# Patient Record
Sex: FEMALE | Race: WHITE | NOT HISPANIC OR LATINO | URBAN - METROPOLITAN AREA
[De-identification: names, ages, dates, MRNs, and addresses within clinical notes are randomized per-mention and may not be internally consistent; named-entity substitution may affect disease eponyms.]

---

## 2021-03-19 ENCOUNTER — INPATIENT (INPATIENT)
Facility: HOSPITAL | Age: 76
LOS: 2 days | Discharge: HOME | DRG: 811 | End: 2021-03-22
Attending: INTERNAL MEDICINE | Admitting: INTERNAL MEDICINE
Payer: MEDICARE

## 2021-03-19 VITALS
SYSTOLIC BLOOD PRESSURE: 93 MMHG | WEIGHT: 123.02 LBS | HEART RATE: 77 BPM | DIASTOLIC BLOOD PRESSURE: 38 MMHG | TEMPERATURE: 98 F | RESPIRATION RATE: 16 BRPM | OXYGEN SATURATION: 100 %

## 2021-03-19 DIAGNOSIS — D69.6 THROMBOCYTOPENIA, UNSPECIFIED: ICD-10-CM

## 2021-03-19 DIAGNOSIS — D64.9 ANEMIA, UNSPECIFIED: ICD-10-CM

## 2021-03-19 DIAGNOSIS — I10 ESSENTIAL (PRIMARY) HYPERTENSION: ICD-10-CM

## 2021-03-19 DIAGNOSIS — Z29.9 ENCOUNTER FOR PROPHYLACTIC MEASURES, UNSPECIFIED: ICD-10-CM

## 2021-03-19 LAB
ABO RH CONFIRMATION: SIGNIFICANT CHANGE UP
ANION GAP SERPL CALC-SCNC: 8 MMOL/L — SIGNIFICANT CHANGE UP (ref 5–17)
APTT BLD: 31.6 SEC — SIGNIFICANT CHANGE UP (ref 27.5–35.5)
BASOPHILS # BLD AUTO: 0.01 K/UL — SIGNIFICANT CHANGE UP (ref 0–0.2)
BASOPHILS NFR BLD AUTO: 0.2 % — SIGNIFICANT CHANGE UP (ref 0–2)
BUN SERPL-MCNC: 26 MG/DL — HIGH (ref 7–18)
CALCIUM SERPL-MCNC: 8.4 MG/DL — SIGNIFICANT CHANGE UP (ref 8.4–10.5)
CHLORIDE SERPL-SCNC: 105 MMOL/L — SIGNIFICANT CHANGE UP (ref 96–108)
CO2 SERPL-SCNC: 24 MMOL/L — SIGNIFICANT CHANGE UP (ref 22–31)
CREAT SERPL-MCNC: 1.07 MG/DL — SIGNIFICANT CHANGE UP (ref 0.5–1.3)
EOSINOPHIL # BLD AUTO: 0.13 K/UL — SIGNIFICANT CHANGE UP (ref 0–0.5)
EOSINOPHIL NFR BLD AUTO: 2.2 % — SIGNIFICANT CHANGE UP (ref 0–6)
FERRITIN SERPL-MCNC: 33 NG/ML — SIGNIFICANT CHANGE UP (ref 15–150)
FOLATE SERPL-MCNC: 16.2 NG/ML — SIGNIFICANT CHANGE UP
GLUCOSE SERPL-MCNC: 90 MG/DL — SIGNIFICANT CHANGE UP (ref 70–99)
HCT VFR BLD CALC: 20 % — CRITICAL LOW (ref 34.5–45)
HCT VFR BLD CALC: 28.8 % — LOW (ref 34.5–45)
HGB BLD-MCNC: 5.6 G/DL — CRITICAL LOW (ref 11.5–15.5)
HGB BLD-MCNC: 8.9 G/DL — LOW (ref 11.5–15.5)
IMM GRANULOCYTES NFR BLD AUTO: 0.7 % — SIGNIFICANT CHANGE UP (ref 0–1.5)
INR BLD: 0.96 RATIO — SIGNIFICANT CHANGE UP (ref 0.88–1.16)
IRON SATN MFR SERPL: 11 % — LOW (ref 15–50)
IRON SATN MFR SERPL: 35 UG/DL — LOW (ref 40–150)
LDH SERPL L TO P-CCNC: 222 U/L — SIGNIFICANT CHANGE UP (ref 120–225)
LYMPHOCYTES # BLD AUTO: 1.73 K/UL — SIGNIFICANT CHANGE UP (ref 1–3.3)
LYMPHOCYTES # BLD AUTO: 28.9 % — SIGNIFICANT CHANGE UP (ref 13–44)
MCHC RBC-ENTMCNC: 26.8 PG — LOW (ref 27–34)
MCHC RBC-ENTMCNC: 27.5 PG — SIGNIFICANT CHANGE UP (ref 27–34)
MCHC RBC-ENTMCNC: 28 GM/DL — LOW (ref 32–36)
MCHC RBC-ENTMCNC: 30.9 GM/DL — LOW (ref 32–36)
MCV RBC AUTO: 88.9 FL — SIGNIFICANT CHANGE UP (ref 80–100)
MCV RBC AUTO: 95.7 FL — SIGNIFICANT CHANGE UP (ref 80–100)
MONOCYTES # BLD AUTO: 0.37 K/UL — SIGNIFICANT CHANGE UP (ref 0–0.9)
MONOCYTES NFR BLD AUTO: 6.2 % — SIGNIFICANT CHANGE UP (ref 2–14)
NEUTROPHILS # BLD AUTO: 3.71 K/UL — SIGNIFICANT CHANGE UP (ref 1.8–7.4)
NEUTROPHILS NFR BLD AUTO: 61.8 % — SIGNIFICANT CHANGE UP (ref 43–77)
NRBC # BLD: 0 /100 WBCS — SIGNIFICANT CHANGE UP (ref 0–0)
NRBC # BLD: 0 /100 WBCS — SIGNIFICANT CHANGE UP (ref 0–0)
OB PNL STL: NEGATIVE — SIGNIFICANT CHANGE UP
PLATELET # BLD AUTO: 24 K/UL — LOW (ref 150–400)
PLATELET # BLD AUTO: 58 K/UL — LOW (ref 150–400)
POTASSIUM SERPL-MCNC: 4.2 MMOL/L — SIGNIFICANT CHANGE UP (ref 3.5–5.3)
POTASSIUM SERPL-SCNC: 4.2 MMOL/L — SIGNIFICANT CHANGE UP (ref 3.5–5.3)
PROTHROM AB SERPL-ACNC: 11.4 SEC — SIGNIFICANT CHANGE UP (ref 10.6–13.6)
RBC # BLD: 2.09 M/UL — LOW (ref 3.8–5.2)
RBC # BLD: 2.1 M/UL — LOW (ref 3.8–5.2)
RBC # BLD: 3.24 M/UL — LOW (ref 3.8–5.2)
RBC # FLD: 15.6 % — HIGH (ref 10.3–14.5)
RBC # FLD: 17.5 % — HIGH (ref 10.3–14.5)
RETICS #: 92 K/UL — SIGNIFICANT CHANGE UP (ref 25–125)
RETICS/RBC NFR: 4.4 % — HIGH (ref 0.5–2.5)
SARS-COV-2 RNA SPEC QL NAA+PROBE: DETECTED
SODIUM SERPL-SCNC: 137 MMOL/L — SIGNIFICANT CHANGE UP (ref 135–145)
TIBC SERPL-MCNC: 326 UG/DL — SIGNIFICANT CHANGE UP (ref 250–450)
UIBC SERPL-MCNC: 291 UG/DL — SIGNIFICANT CHANGE UP (ref 110–370)
VIT B12 SERPL-MCNC: 380 PG/ML — SIGNIFICANT CHANGE UP (ref 232–1245)
WBC # BLD: 5.99 K/UL — SIGNIFICANT CHANGE UP (ref 3.8–10.5)
WBC # BLD: 6.01 K/UL — SIGNIFICANT CHANGE UP (ref 3.8–10.5)
WBC # FLD AUTO: 5.99 K/UL — SIGNIFICANT CHANGE UP (ref 3.8–10.5)
WBC # FLD AUTO: 6.01 K/UL — SIGNIFICANT CHANGE UP (ref 3.8–10.5)

## 2021-03-19 PROCEDURE — 71260 CT THORAX DX C+: CPT | Mod: 26

## 2021-03-19 PROCEDURE — 74177 CT ABD & PELVIS W/CONTRAST: CPT | Mod: 26

## 2021-03-19 PROCEDURE — 99223 1ST HOSP IP/OBS HIGH 75: CPT | Mod: GC

## 2021-03-19 PROCEDURE — 99285 EMERGENCY DEPT VISIT HI MDM: CPT

## 2021-03-19 PROCEDURE — 93010 ELECTROCARDIOGRAM REPORT: CPT

## 2021-03-19 RX ORDER — CHOLECALCIFEROL (VITAMIN D3) 125 MCG
1000 CAPSULE ORAL DAILY
Refills: 0 | Status: DISCONTINUED | OUTPATIENT
Start: 2021-03-19 | End: 2021-03-22

## 2021-03-19 RX ORDER — ACETAMINOPHEN 500 MG
650 TABLET ORAL EVERY 6 HOURS
Refills: 0 | Status: DISCONTINUED | OUTPATIENT
Start: 2021-03-19 | End: 2021-03-22

## 2021-03-19 RX ORDER — ATORVASTATIN CALCIUM 80 MG/1
80 TABLET, FILM COATED ORAL AT BEDTIME
Refills: 0 | Status: DISCONTINUED | OUTPATIENT
Start: 2021-03-19 | End: 2021-03-22

## 2021-03-19 RX ORDER — METOPROLOL TARTRATE 50 MG
5 TABLET ORAL EVERY 6 HOURS
Refills: 0 | Status: DISCONTINUED | OUTPATIENT
Start: 2021-03-19 | End: 2021-03-22

## 2021-03-19 RX ORDER — PANTOPRAZOLE SODIUM 20 MG/1
40 TABLET, DELAYED RELEASE ORAL DAILY
Refills: 0 | Status: DISCONTINUED | OUTPATIENT
Start: 2021-03-19 | End: 2021-03-22

## 2021-03-19 RX ORDER — PANTOPRAZOLE SODIUM 20 MG/1
40 TABLET, DELAYED RELEASE ORAL
Refills: 0 | Status: DISCONTINUED | OUTPATIENT
Start: 2021-03-19 | End: 2021-03-19

## 2021-03-19 RX ORDER — CARVEDILOL PHOSPHATE 80 MG/1
12.5 CAPSULE, EXTENDED RELEASE ORAL EVERY 12 HOURS
Refills: 0 | Status: DISCONTINUED | OUTPATIENT
Start: 2021-03-19 | End: 2021-03-19

## 2021-03-19 RX ADMIN — ATORVASTATIN CALCIUM 80 MILLIGRAM(S): 80 TABLET, FILM COATED ORAL at 22:21

## 2021-03-19 RX ADMIN — Medication 5 MILLIGRAM(S): at 18:13

## 2021-03-19 RX ADMIN — Medication 650 MILLIGRAM(S): at 19:53

## 2021-03-19 NOTE — H&P ADULT - HISTORY OF PRESENT ILLNESS
75 yr F with PMH of HTN, HLD, right sided Carotid artery 70% stenosis, anemia induced HF in 2020, chronic anemia was sent to ED for low Hb. Pt went to see Dr. Mosley yesterday for her chronic anemia and her Hb was 6.3 and she was sent to ED to get blood transfusion. Pt has chronic anemia since May 2020 and was hospitalised at that time for anemia causing heart failure. Pt got EGD/Colonoscopy in May 2020 at [facility] and it was normal. Pt was admitted recently to [facility] in Feb and she got 2 units blood and was discharged with Hb of 9. Pt complains of generalized weakness, fatigue and palpitations. Pt denies any abdominal pain, bleeding, use of NSAIDS, chest pain, SOB, dizziness or any other acute complaints.    In ED, /50, HR 68

## 2021-03-19 NOTE — ED PROVIDER NOTE - PROGRESS NOTE DETAILS
Patient is resting comfortably, NAD. I spoke to Dr. Mosley. Will admit due to worsening anemia and hypotension. Endorsed to Dr. Christiansen. MAR requested CT abdomen/pelvis. She will follow results. Abdominal exam benign

## 2021-03-19 NOTE — CONSULT NOTE ADULT - ATTENDING COMMENTS
I was physically present for the key portions of the evaluation and management (E/M) service provided.  The patient was personally seen and examined at bedside.  I have edited the note as appropriate.   Thank you for your consultation and allowing  me to participate in the care of your patients. If you have further questions please contact me at [phone].     Manuel Loyd M.D.       _________________________________________________________________________________________________  [facility]  [address] [place] [address]  Office: [phone]    Alex Kruger PA-C  ___________________________________________________________________________________________________

## 2021-03-19 NOTE — H&P ADULT - PROBLEM SELECTOR PLAN 2
p/w Platelet count 24  Pt has H/O Thrombocytopenia for last 1 year  Not actively bleeding  Will transfuse 1 unit platelets  Monitor platelet count   Heme/Onc Dr. Mosley

## 2021-03-19 NOTE — H&P ADULT - PROBLEM SELECTOR PLAN 4
RISK                                                          Points  [  ] Previous VTE                                                3  [  ] Thrombophilia                                             2  [  ] Lower limb paralysis                                   2        (unable to hold up >15 seconds)    [  ] Current Cancer                                             2         (within 6 months)  [ x ] Immobilization > 24 hrs                              1  [  ] ICU/CCU stay > 24 hours                             1  [ x ] Age > 60                                                         1    IMPROVE VTE Score: 2  Not on VTE prophylaxis due to anemia and thrombocytopenia

## 2021-03-19 NOTE — H&P ADULT - ASSESSMENT
75 yr F with PMH of HTN, HLD, right sided Carotid artery 70% stenosis, anemia induced HF in 2020, chronic anemia was sent to ED for low Hb. Pt went to see Dr. Mosley yesterday for her chronic anemia and her Hb was 6.3 and she was sent to ED to get blood transfusion.       In ED, /50, HR 68    Pt admitted for Anemia and thrombocytopenia

## 2021-03-19 NOTE — PATIENT PROFILE ADULT - NSPROIMPLANTSMEDDEV_GEN_A_NUR
Physical Therapy   Daily Treatment     Patient Name: Danielle Garcia  Age:  84 y.o., Sex:  female  Medical Record #: [id number]  Today's Date: 6/3/2020     Precautions  Precautions: Fall Risk, Other (See Comments)  Comments: Pacemaker prec L UE, L boot, seizure prec, posterior and left LOB's in standing     Subjective    Patient and daughter were agreeable to POC, and receptive to all training.      Objective       06/03/20 0931   Precautions   Precautions Fall Risk;Other (See Comments)   Comments Pacemaker prec L UE, L boot, seizure prec, posterior and left LOB's in standing    Gait Functional Level of Assist    Gait Level Of Assist Contact Guard Assist   Assistive Device Front Wheel Walker   Distance (Feet) 50   # of Times Distance was Traveled 2   Deviation Bradykinetic;Decreased Heel Strike;Decreased Toe Off   Stairs Functional Level of Assist   Level of Assist with Stairs Contact Guard Assist   Transfer Functional Level of Assist   Bed, Chair, Wheelchair Transfer Contact Guard Assist   Bed Chair Wheelchair Transfer Description Adaptive equipment   Bed Mobility    Supine to Sit Supervised   Sit to Supine Supervised   Sit to Stand Contact Guard Assist   Scooting Supervised   Rolling Supervised   Neuro-Muscular Treatments   Neuro-Muscular Treatments Weight Shift Right;Weight Shift Left;Verbal Cuing;Tactile Cuing;Sequencing   Comments Family training for guarding with gait belt, home safety/ fall prevention handouts reviewed/ provided.     Interdisciplinary Plan of Care Collaboration   IDT Collaboration with  Family / Caregiver   Patient Position at End of Therapy Seated;Family / Friend in Room   Collaboration Comments Family training for gait, transfer, and stairs with BHR.    PT Total Time Spent   PT Individual Total Time Spent (Mins) 30   PT Charge Group   PT Neuromuscular Re-Education / Balance 1   PT Therapeutic Activities 1       Assessment    Daughter was able to guard appropriately for all functional  "mobility tasks, using gait belt and proper hand placements. Patient continues to demonstrate unsteadiness.     Plan    Ongoing safety with FWW, balance training, endurance, prepare to D/C, D/C data collection.     Physical Therapy Problems     Problem: Balance     Dates: Start: 05/21/20       Description: 1) Individualized goal:  x 5 minutes with R UE support  2) Interventions:  PT E Stim Attended, PT Gait Training, PT Self Care/Home Eval, PT Therapeutic Exercises, PT Neuro Re-Ed/Balance, PT Therapeutic Activity, and PT Evaluation            Problem: Mobility     Dates: Start: 06/03/20       Goal: STG-Within one week, patient will ambulate household distance     Dates: Start: 06/03/20       Description: 1) Individualized goal:  Patient will amb 50 ft SBA 2x with FWW  2) Interventions:  PT Gait Training, PT Therapeutic Exercises, PT Neuro Re-Ed/Balance, PT Aquatic Therapy, PT Therapeutic Activity, and PT Manual Therapy            Goal: STG-Within one week, patient will ambulate up/down flight of stairs     Dates: Start: 06/03/20       Description: 1) Individualized goal: Patient will amb with up/down 4 6\" stairs CGA with BHR CGA  2) Interventions:  PT Gait Training, PT Self Care/Home Eval, PT Therapeutic Exercises, PT Neuro Re-Ed/Balance, PT Aquatic Therapy, and PT Manual Therapy                  Problem: Mobility Transfers     Dates: Start: 05/21/20       Description: 1) Individualized goal:  SBA for supine to sit to supine from flat bed, bed rail for assist  2) Interventions:  PT E Stim Attended, PT Gait Training, PT Self Care/Home Eval, PT Therapeutic Exercises, PT Neuro Re-Ed/Balance, PT Therapeutic Activity, and PT Evaluation        Goal: STG-Within one week, patient will transfer bed to chair     Dates: Start: 05/21/20       Description: 1) Individualized goal:  SBA for stand pivot transfer with LRAD  2) Interventions:  PT E Stim Attended, PT Gait Training, PT Self Care/Home Eval, PT Therapeutic Exercises, PT " Neuro Re-Ed/Balance, PT Therapeutic Activity, and PT Evaluation        Note:     Goal Note filed on 06/03/20 1107 by Radha Miller, DPT    CGA SPT FWW                        Problem: PT-Long Term Goals     Dates: Start: 05/21/20       Goal: LTG-By discharge, patient will tolerate standing     Dates: Start: 05/21/20       Description: 1) Individualized goal:  x 20 minutes with LRAD, Deann  2) Interventions: PT E Stim Attended, PT Gait Training, PT Self Care/Home Eval, PT Therapeutic Exercises, PT Neuro Re-Ed/Balance, PT Therapeutic Activity, and PT Evaluation              Goal: LTG-By discharge, patient will propel wheelchair     Dates: Start: 05/21/20       Description: 1) Individualized goal: x 300 ft with B LE propulsion, Deann indoor surfaces, 2 corners negotiated  2) Interventions: PT E Stim Attended, PT Gait Training, PT Self Care/Home Eval, PT Therapeutic Exercises, PT Neuro Re-Ed/Balance, PT Therapeutic Activity, and PT Evaluation              Goal: LTG-By discharge, patient will ambulate     Dates: Start: 05/21/20       Description: 1) Individualized goal:  x 300 ft, community distances, indoor surfaces Deann with LRAD  2) Interventions:  PT E Stim Attended, PT Gait Training, PT Self Care/Home Eval, PT Therapeutic Exercises, PT Neuro Re-Ed/Balance, PT Therapeutic Activity, and PT Evaluation              Goal: LTG-By discharge, patient will transfer one surface to another     Dates: Start: 05/21/20       Description: 1) Individualized goal:  Deann for household surfaces, supervised for car transfer with LRAD  2) Interventions:  PT E Stim Attended, PT Gait Training, PT Self Care/Home Eval, PT Therapeutic Exercises, PT Neuro Re-Ed/Balance, PT Therapeutic Activity, and PT Evaluation              Goal: LTG-By discharge, patient will ambulate up/down 4-6 stairs     Dates: Start: 05/21/20       Description: 1) Individualized goal: x 4 stairs with right HR, supervision  2) Interventions: PT E Stim Attended, PT Gait  Training, PT Self Care/Home Eval, PT Therapeutic Exercises, PT Neuro Re-Ed/Balance, PT Therapeutic Activity, and PT Evaluation                         None

## 2021-03-19 NOTE — PATIENT PROFILE ADULT - VISION (WITH CORRECTIVE LENSES IF THE PATIENT USUALLY WEARS THEM):
USES GLASSES/Normal vision: sees adequately in most situations; can see medication labels, newsprint

## 2021-03-19 NOTE — H&P ADULT - ATTENDING COMMENTS
74 yo F with PMH of HTN, HLD, right sided Carotid artery 70% stenosis, h/o anemia induced HF last year and recent anemia requiring several blood transfusion in Feb 2021 presents to ER with severe symptomatic anemia. Patient was sent to Dr Mosley’s office by her PCP due to anemia (Hb 6). Patient reports weakness, tiredness, and intermittent palpitations. She has noticed purpuric spots and easy bruising for last 1yr. Denies any active bleeding. Reports she had EGD and Colonoscopy last year which was normal. Also was COVID positive in Feb. Complains of recent weight loss. Has not done mammogram for some years.    Other ROS is neg     Vitals noted, BP improved from 90s/40s to 130s/50s    P/E:  NAD, pale   AAOx3, no focal deficit   S1S2 WNL, no MRG  Lungs CTABL   Abd soft non tender, BS present   No lymphadenopathy noted   Multiple purpuric spots and bruises in skin   Breast: grossly normal on exam    Labs noted     Assessment:  Bicytopenia Severe anemia and thrombocytopenia   Recent weight loss, concern for malignancy  HTN  Right sided Carotid artery 70% stenosis    Plan:  Two large bore IV canula, transfuse 2units PRBC stat with one unit of platelet  CT chest, abd and pelvis with contrast to evaluate for malignancy  Monitor post transfusion CBC  IV PPI  Send anemia work up, including hemolysis work up  Discussed with Dr Mosley, agrees with above   Hold all anti-hypertensives  Have active type and screen  NPO for now  Hold aspirin home medication  SCD for DVT ppx  Full code

## 2021-03-19 NOTE — ED PROVIDER NOTE - OBJECTIVE STATEMENT
Patient sent by Dr. Mosley for blood transfusion. Saw him for the first time yesterday for anemia. Was hospitalized in May 2020 and February 2021 at [facility] for anemia requiring blood transfusions. Had normal colonoscopy and endoscopy after May hospitalization. Occult blood has been found in stool before but no gross blood or black stool. Feels generally weak. No fever, cp, sob, ap, n/v/d, syncope. BP was low in office yesterday. Dr. Mosley stopped her valsartan and iron supplements.

## 2021-03-19 NOTE — H&P ADULT - PROBLEM SELECTOR PLAN 3
On coreg 25 bid at home  Started on coreg 12.5 bid  Monitor BP On coreg 25 bid at home  Will hold for now  Monitor BP

## 2021-03-19 NOTE — ED ADULT NURSE NOTE - ED STAT RN HANDOFF DETAILS 4
Pt is alert and oriented x3. No sign of acute distress noted. Safety precaution maintained. Pt is transferred to  in stable condition.

## 2021-03-19 NOTE — H&P ADULT - PROBLEM SELECTOR PLAN 1
p/w Hb 5.6  FOBT negative  Hemodynamically stable  Not actively bleeding  EGD/Colonoscopy in May 2020 at [facility] was normal  c/w protonix  Giving 2 units PRBC  f/u Anemia panel  f/u Post transfusion CBC  Monitor CBC q12  Heme/Onc Dr. Mosley p/w Hb 5.6  FOBT negative  Hemodynamically stable  Not actively bleeding  EGD/Colonoscopy in May 2020 at [facility] was normal  Will hold Aspirin   c/w protonix  Giving 2 units PRBC  f/u Anemia panel  f/u Post transfusion CBC  f/u CT Chest/Abdomen/pelvis to r/o Malignancy   Monitor CBC q12  Heme/Onc Dr. Mosley  GI

## 2021-03-19 NOTE — CONSULT NOTE ADULT - SUBJECTIVE AND OBJECTIVE BOX
Patient is a 75y old  Female who presents with a chief complaint of    .     HPI:  HPI:  75 yr F with PMH of HTN, HLD, right sided Carotid artery 70% stenosis, anemia induced HF in 2020, chronic anemia was sent to ED for low Hb. Pt went to see Dr. Mosley yesterday for her chronic anemia and her Hb was 6.3 and she was sent to ED to get blood transfusion. Pt has chronic anemia since May 2020 and was hospitalised at that time for anemia causing heart failure. Pt got EGD/Colonoscopy in May 2020 at [facility] and it was normal. Pt was admitted recently to [facility] in Feb and she got 2 units blood and was discharged with Hb of 9. Pt complains of generalized weakness, fatigue and palpitations. Pt denies any abdominal pain, bleeding, use of NSAIDS, chest pain, SOB, dizziness or any other acute complaints.    In ED, /50, HR 68 (19 Mar 2021 12:43)            REVIEW OF SYSTEMS  Constitutional:   No fever, no fatigue, no pallor, no night sweats, no weight loss.  HEENT:   No eye pain, no vision changes, no icterus, no mouth ulcers.  Respiratory:   No shortness of breath, no cough, no respiratory distress.   Cardiovascular:   No chest pain, no palpitations.   Gastrointestinal: No abdominal pain, no nausea, no vomiting , no diahrrea, no constipation, no hematochezia,no melena.  Skin:   No rashes, no jaundice, no eczema.   Musculoskeletal:   No joint pain, no swelling, no myalgia.   Neurologic:   No headache, no seizure, no weakness.   Genitourinary:   No dysuria, no decreased urine output.  Psychiatric:  No depression, no anxiety,   Endocrine:   No thyroid disease, no diabetes.  Heme/Lymphatic:   No anemia, no blood transfusions, no lymph node enlargement, no bleeding, no bruising.  ___________________________________________________________________________________________  Allergies    penicillin (Hives)    Intolerances      MEDICATIONS  (STANDING):  atorvastatin 80 milliGRAM(s) Oral at bedtime  cholecalciferol 1000 Unit(s) Oral daily  pantoprazole  Injectable 40 milliGRAM(s) IV Push daily    MEDICATIONS  (PRN):  metoprolol tartrate Injectable 5 milliGRAM(s) IV Push every 6 hours PRN for HR>100      PAST MEDICAL & SURGICAL HISTORY:  Anemia    Hypertension      FAMILY HISTORY:    Social History: No hsitory of : Tobacco use, IVDA, EToH  ______________________________________________________________________________________    PHYSICAL EXAM    Daily     Daily   BMI:   Change in Weight:  Vital Signs Last 24 Hrs  T(C): 36.9 (19 Mar 2021 11:37), Max: 37.1 (19 Mar 2021 11:02)  T(F): 98.5 (19 Mar 2021 11:37), Max: 98.7 (19 Mar 2021 11:02)  HR: 68 (19 Mar 2021 11:37) (68 - 77)  BP: 133/50 (19 Mar 2021 11:37) (93/38 - 133/50)  BP(mean): --  RR: 17 (19 Mar 2021 11:37) (16 - 19)  SpO2: 98% (19 Mar 2021 11:37) (98% - 100%)    General:  Well developed, well nourished, alert and active, no pallor, NAD.  HEENT:    Normal appearance of conjunctiva, ears, nose, lips, oropharynx, and oral mucosa, anicteric.  Neck:  No masses, no asymmetry.  Lymph Nodes:  No lymphadenopathy.   Cardiovascular:  RRR normal S1/S2, no murmur.  Respiratory:  CTA B/L, normal respiratory effort.   Abdominal:   soft, no masses or tenderness, normoactive BS, NT/ND, no HSM.  Extremities:   No clubbing or cyanosis, normal capillary refill, no edema.   Skin:   No rash, jaundice, lesions, eczema.   Musculoskeletal:  No joint swelling, erythema or tenderness.   Neuro: No focal deficits.   Other:   _______________________________________________________________________________________________  Lab Results:                          5.6    5.99  )-----------( 24       ( 19 Mar 2021 09:01 )             20.0     03-19    137  |  105  |  26<H>  ----------------------------<  90  4.2   |  24  |  1.07    Ca    8.4      19 Mar 2021 09:01        PT/INR - ( 19 Mar 2021 09:01 )   PT: 11.4 sec;   INR: 0.96 ratio         PTT - ( 19 Mar 2021 09:01 )  PTT:31.6 sec        Stool Results:          RADIOLOGY RESULTS:    SURGICAL PATHOLOGY:

## 2021-03-20 LAB
A1C WITH ESTIMATED AVERAGE GLUCOSE RESULT: 5.3 % — SIGNIFICANT CHANGE UP (ref 4–5.6)
ALBUMIN SERPL ELPH-MCNC: 2.8 G/DL — LOW (ref 3.5–5)
ALP SERPL-CCNC: 91 U/L — SIGNIFICANT CHANGE UP (ref 40–120)
ALT FLD-CCNC: 19 U/L DA — SIGNIFICANT CHANGE UP (ref 10–60)
ANION GAP SERPL CALC-SCNC: 8 MMOL/L — SIGNIFICANT CHANGE UP (ref 5–17)
ANISOCYTOSIS BLD QL: SLIGHT — SIGNIFICANT CHANGE UP
AST SERPL-CCNC: 18 U/L — SIGNIFICANT CHANGE UP (ref 10–40)
BASOPHILS # BLD AUTO: 0.01 K/UL — SIGNIFICANT CHANGE UP (ref 0–0.2)
BASOPHILS NFR BLD AUTO: 0.1 % — SIGNIFICANT CHANGE UP (ref 0–2)
BILIRUB SERPL-MCNC: 0.7 MG/DL — SIGNIFICANT CHANGE UP (ref 0.2–1.2)
BUN SERPL-MCNC: 18 MG/DL — SIGNIFICANT CHANGE UP (ref 7–18)
CALCIUM SERPL-MCNC: 8.8 MG/DL — SIGNIFICANT CHANGE UP (ref 8.4–10.5)
CHLORIDE SERPL-SCNC: 104 MMOL/L — SIGNIFICANT CHANGE UP (ref 96–108)
CHOLEST SERPL-MCNC: 151 MG/DL — SIGNIFICANT CHANGE UP
CO2 SERPL-SCNC: 25 MMOL/L — SIGNIFICANT CHANGE UP (ref 22–31)
CREAT SERPL-MCNC: 0.98 MG/DL — SIGNIFICANT CHANGE UP (ref 0.5–1.3)
EOSINOPHIL # BLD AUTO: 0.18 K/UL — SIGNIFICANT CHANGE UP (ref 0–0.5)
EOSINOPHIL NFR BLD AUTO: 2.6 % — SIGNIFICANT CHANGE UP (ref 0–6)
ESTIMATED AVERAGE GLUCOSE: 105 MG/DL — SIGNIFICANT CHANGE UP (ref 68–114)
GLUCOSE SERPL-MCNC: 83 MG/DL — SIGNIFICANT CHANGE UP (ref 70–99)
HAPTOGLOB SERPL-MCNC: 181 MG/DL — SIGNIFICANT CHANGE UP (ref 34–200)
HCT VFR BLD CALC: 26.9 % — LOW (ref 34.5–45)
HCT VFR BLD CALC: 28.9 % — LOW (ref 34.5–45)
HCV AB S/CO SERPL IA: 0.43 S/CO — SIGNIFICANT CHANGE UP (ref 0–0.99)
HCV AB SERPL-IMP: SIGNIFICANT CHANGE UP
HDLC SERPL-MCNC: 47 MG/DL — LOW
HGB BLD-MCNC: 8.4 G/DL — LOW (ref 11.5–15.5)
HGB BLD-MCNC: 9 G/DL — LOW (ref 11.5–15.5)
IMM GRANULOCYTES NFR BLD AUTO: 0.7 % — SIGNIFICANT CHANGE UP (ref 0–1.5)
LIPID PNL WITH DIRECT LDL SERPL: 90 MG/DL — SIGNIFICANT CHANGE UP
LYMPHOCYTES # BLD AUTO: 1.98 K/UL — SIGNIFICANT CHANGE UP (ref 1–3.3)
LYMPHOCYTES # BLD AUTO: 28.6 % — SIGNIFICANT CHANGE UP (ref 13–44)
MAGNESIUM SERPL-MCNC: 2.4 MG/DL — SIGNIFICANT CHANGE UP (ref 1.6–2.6)
MANUAL SMEAR VERIFICATION: SIGNIFICANT CHANGE UP
MCHC RBC-ENTMCNC: 27.5 PG — SIGNIFICANT CHANGE UP (ref 27–34)
MCHC RBC-ENTMCNC: 28.2 PG — SIGNIFICANT CHANGE UP (ref 27–34)
MCHC RBC-ENTMCNC: 31.1 GM/DL — LOW (ref 32–36)
MCHC RBC-ENTMCNC: 31.2 GM/DL — LOW (ref 32–36)
MCV RBC AUTO: 88.4 FL — SIGNIFICANT CHANGE UP (ref 80–100)
MCV RBC AUTO: 90.3 FL — SIGNIFICANT CHANGE UP (ref 80–100)
MICROCYTES BLD QL: SLIGHT — SIGNIFICANT CHANGE UP
MONOCYTES # BLD AUTO: 0.57 K/UL — SIGNIFICANT CHANGE UP (ref 0–0.9)
MONOCYTES NFR BLD AUTO: 8.2 % — SIGNIFICANT CHANGE UP (ref 2–14)
NEUTROPHILS # BLD AUTO: 4.14 K/UL — SIGNIFICANT CHANGE UP (ref 1.8–7.4)
NEUTROPHILS NFR BLD AUTO: 59.8 % — SIGNIFICANT CHANGE UP (ref 43–77)
NON HDL CHOLESTEROL: 104 MG/DL — SIGNIFICANT CHANGE UP
NRBC # BLD: 0 /100 WBCS — SIGNIFICANT CHANGE UP (ref 0–0)
NRBC # BLD: 0 /100 WBCS — SIGNIFICANT CHANGE UP (ref 0–0)
PHOSPHATE SERPL-MCNC: 3.9 MG/DL — SIGNIFICANT CHANGE UP (ref 2.5–4.5)
PLAT MORPH BLD: NORMAL — SIGNIFICANT CHANGE UP
PLATELET # BLD AUTO: 54 K/UL — LOW (ref 150–400)
PLATELET # BLD AUTO: 65 K/UL — LOW (ref 150–400)
POLYCHROMASIA BLD QL SMEAR: SLIGHT — SIGNIFICANT CHANGE UP
POTASSIUM SERPL-MCNC: 3.8 MMOL/L — SIGNIFICANT CHANGE UP (ref 3.5–5.3)
POTASSIUM SERPL-SCNC: 3.8 MMOL/L — SIGNIFICANT CHANGE UP (ref 3.5–5.3)
PROT SERPL-MCNC: 6.7 G/DL — SIGNIFICANT CHANGE UP (ref 6–8.3)
RBC # BLD: 2.98 M/UL — LOW (ref 3.8–5.2)
RBC # BLD: 3.27 M/UL — LOW (ref 3.8–5.2)
RBC # FLD: 15.9 % — HIGH (ref 10.3–14.5)
RBC # FLD: 16 % — HIGH (ref 10.3–14.5)
RBC BLD AUTO: ABNORMAL
SODIUM SERPL-SCNC: 137 MMOL/L — SIGNIFICANT CHANGE UP (ref 135–145)
TRIGL SERPL-MCNC: 68 MG/DL — SIGNIFICANT CHANGE UP
TSH SERPL-MCNC: 1.45 UU/ML — SIGNIFICANT CHANGE UP (ref 0.34–4.82)
WBC # BLD: 6.06 K/UL — SIGNIFICANT CHANGE UP (ref 3.8–10.5)
WBC # BLD: 6.93 K/UL — SIGNIFICANT CHANGE UP (ref 3.8–10.5)
WBC # FLD AUTO: 6.06 K/UL — SIGNIFICANT CHANGE UP (ref 3.8–10.5)
WBC # FLD AUTO: 6.93 K/UL — SIGNIFICANT CHANGE UP (ref 3.8–10.5)

## 2021-03-20 PROCEDURE — 99233 SBSQ HOSP IP/OBS HIGH 50: CPT | Mod: GC

## 2021-03-20 RX ORDER — LANOLIN ALCOHOL/MO/W.PET/CERES
5 CREAM (GRAM) TOPICAL ONCE
Refills: 0 | Status: COMPLETED | OUTPATIENT
Start: 2021-03-20 | End: 2021-03-20

## 2021-03-20 RX ORDER — CARVEDILOL PHOSPHATE 80 MG/1
12.5 CAPSULE, EXTENDED RELEASE ORAL EVERY 12 HOURS
Refills: 0 | Status: DISCONTINUED | OUTPATIENT
Start: 2021-03-20 | End: 2021-03-22

## 2021-03-20 RX ADMIN — Medication 5 MILLIGRAM(S): at 22:01

## 2021-03-20 RX ADMIN — CARVEDILOL PHOSPHATE 12.5 MILLIGRAM(S): 80 CAPSULE, EXTENDED RELEASE ORAL at 17:32

## 2021-03-20 RX ADMIN — Medication 1000 UNIT(S): at 12:24

## 2021-03-20 RX ADMIN — PANTOPRAZOLE SODIUM 40 MILLIGRAM(S): 20 TABLET, DELAYED RELEASE ORAL at 12:25

## 2021-03-20 RX ADMIN — ATORVASTATIN CALCIUM 80 MILLIGRAM(S): 80 TABLET, FILM COATED ORAL at 22:01

## 2021-03-20 NOTE — PROGRESS NOTE ADULT - ATTENDING COMMENTS
Patient seen and examined. Plan of care discussed with medical team.    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 Boyces. S/p 2 PRBC and 1platelet transfusion on 3/20.     Doing well, denies any respiratory complaints. fatigue is better.     Imp:  Severe symptomatic anemia with Thrombocytopenia ? etiology, Pt had EGD and Colonoscopy as outpatient , No capsular Endoscopy though. Appreciate GI evaluation., monitor Cbc , Continue with protonix, hemodynamically stable with no evidence of active bleeding .   Thrombocytopenia partly due to covid infection, Awaits hematology consult ,  Asymptomatic  COVID infection- PCR is positive  No specific interlobular septal thickening will peripheral ill defined opacities and left hilar adenopathy- ID , Pulmonary evaluation  mixed anemia with a component of Iron deficiency anemia  HTN

## 2021-03-20 NOTE — CONSULT NOTE ADULT - SUBJECTIVE AND OBJECTIVE BOX
HPI:  75 yr F with PMH of HTN, HLD, right sided Carotid artery 70% stenosis, anemia induced HF in 2020, chronic anemia was sent to ED for low Hb. Pt went to see Dr. Mosley yesterday for her chronic anemia and her Hb was 6.3 and she was sent to ED to get blood transfusion. Pt has chronic anemia since May 2020 and was hospitalised at that time for anemia causing heart failure. Pt got [facility] and it was normal. Pt was [facility] recently to [facility] in Feb and she got 2 units blood and was discharged with Hb of 9. Pt complains of generalized weakness, fatigue and palpitations. Pt denies any abdominal pain, bleeding, use of NSAIDS, chest pain, SOB, dizziness or any other acute complaints.    In ED, /50, HR 68 (19 Mar 2021 12:43)      PAST MEDICAL & SURGICAL HISTORY:  Anemia    Hypertension        penicillin (Hives)      Meds:  acetaminophen   Tablet .. 650 milliGRAM(s) Oral every 6 hours PRN  atorvastatin 80 milliGRAM(s) Oral at bedtime  carvedilol 12.5 milliGRAM(s) Oral every 12 hours  cholecalciferol 1000 Unit(s) Oral daily  metoprolol tartrate Injectable 5 milliGRAM(s) IV Push every 6 hours PRN  pantoprazole  Injectable 40 milliGRAM(s) IV Push daily      SOCIAL HISTORY:  Smoker:  YES / NO        PACK YEARS:                         WHEN QUIT?  ETOH use:  YES / NO               FREQUENCY / QUANTITY:  [employer]lic[employer] Drug use:  YES / NO  Occupation:  Assisted device use (Cane / Walker):  Live with:    FAMILY HISTORY:      VITALS:  Vital Signs Last 24 Hrs  T(C): 36.7 (20 Mar 2021 13:46), Max: 37 (20 Mar 2021 04:48)  T(F): 98 (20 Mar 2021 13:46), Max: 98.6 (20 Mar 2021 04:48)  HR: 80 (20 Mar 2021 17:39) (72 - 80)  BP: 178/56 (20 Mar 2021 17:39) (105/42 - 178/56)  BP(mean): --  RR: 17 (20 Mar 2021 17:39) (16 - 18)  SpO2: 99% (20 Mar 2021 17:39) (97% - 99%)    LABS/DIAGNOSTIC TESTS:                          8.4    6.06  )-----------( x        ( 20 Mar 2021 18:29 )             26.9     WBC Count: 6.06 K/uL (03-20 @ 18:29)  WBC Count: 6.93 K/uL (03-20 @ 07:21)  WBC Count: 6.01 K/uL (03-19 @ 18:37)  WBC Count: 5.99 K/uL (03-19 @ 09:01)      03-20    137  |  104  |  18  ----------------------------<  83  3.8   |  25  |  0.98    Ca    8.8      20 Mar 2021 07:21  Phos  3.9     03-20  Mg     2.4     03-20    TPro  6.7  /  Alb  2.8<L>  /  TBili  0.7  /  DBili  x   /  AST  18  /  ALT  19  /  AlkPhos  91  03-20          LIVER FUNCTIONS - ( 20 Mar 2021 07:21 )  Alb: 2.8 g/dL / Pro: 6.7 g/dL / ALK PHOS: 91 U/L / ALT: 19 U/L DA / AST: 18 U/L / GGT: x             PT/INR - ( 19 Mar 2021 09:01 )   PT: 11.4 sec;   INR: 0.96 ratio         PTT - ( 19 Mar 2021 09:01 )  PTT:31.6 sec    LACTATE:    ABG -     CULTURES:       RADIOLOGY:< from: CT Chest w/ IV Cont (03.19.21 @ 18:41) >  EXAM:  CT ABDOMEN AND PELVIS IC                          EXAM:  CT CHEST IC                            PROCEDURE DATE:  03/19/2021          INTERPRETATION:  CLINICAL INFORMATION: Anemia. Evaluate for malignancy.    COMPARISON: None.    CONTRAST/COMPLICATIONS:  IV Contrast: Omnipaque 350  90 cc administered   10 cc discarded  Oral Contrast: NONE  Complications: None reported at time of study completion    PROCEDURE:  CT of the Chest, Abdomen and Pelvis was performed.  Sagittal and coronal reformats were performed.    FINDINGS:  CHEST:  LUNGS AND LARGE AIRWAYS: Patent central airways. Diffuse interlobular septal thickening with numerous scattered small ill-defined peripheral opacities as well as impacted distal airways in the left upper lobe.Few tiny nodular opacities noted, at least one of which is cavitary, measuring 6 mm in the superior segment of the right lower lobe (2, 61). Left upper lobe calcifications, likely from prior infection. Linear opacity in the left upper lobe, possibly scarring.  PLEURA: No pleural effusion.  VESSELS: Aortic, aortic valvular and coronary artery calcifications.  HEART: Heart size is normal. No pericardial effusion.  MEDIASTINUM AND LOBO: No mediastinal lymphadenopathy. 1.5 x 1.0 cm mild right hilar adenopathy.  CHEST WALL AND LOWER NECK: 1.3 cm hypodense right thyroid nodule which could be better evaluated with ultrasound. Indeterminate 2 cm superficial right lower anterior chest wall nodule (series 2, image 80).    ABDOMEN AND PELVIS:  LIVER: Within normal limits.  BILE DUCTS: Normal caliber.  GALLBLADDER: Within normal limits.  SPLEEN: Within normal limits.  PANCREAS: Within normal limits.  ADRENALS: Within normal limits.  KIDNEYS/URETERS: Atrophic left kidney. Bilateral renal cysts and subcentimeter right renal hypodense foci that are too small to characterize. Right upper pole caliectasis without rachel hydronephrosis and without ureteral dilatation.    BLADDER: Within normal limits.  REPRODUCTIVE ORGANS: Heterogeneous uterus, probablyon the basis of small fibroids. No adnexal mass.    BOWEL: Small hiatal hernia. No bowel obstruction. Appendix is normal.  PERITONEUM: No ascites.  VESSELS: Atherosclerotic changes.  RETROPERITONEUM/LYMPH NODES: No lymphadenopathy.  ABDOMINAL WALL: Within normal limits.  BONES: Degenerative changes.    IMPRESSION:  Nonspecific diffuse interlobular septal thickening with scattered small ill-defined peripheral opacities and scattered nodules, at least one of which is cavitary, question infection. Left hilar adenopathy.    Atrophic left kidney. Right upper pole caliectasis of unknown etiology. Correlate with urine cytology and as necessary, CT urogram.    Right thyroid nodule, which could be better evaluated with ultrasound.    Superficial indeterminate right midline anterior chest wall nodule, that should be further assessed upon clinical/dermatologic grounds.            MARGARET SCHROEDER MD; Attending Radiologist  This document has been electronically signed. Mar 19 2021  6:53PM    < end of copied text >        ROS  [  ] UNABLE TO ELICIT               HPI:  75 yr F with PMH of HTN, HLD, right sided Carotid artery 70% stenosis, anemia induced HF in 2020, chronic anemia was sent to ED for low Hb. Pt went to see Dr. Mosley yesterday for her chronic anemia and her Hb was 6.3 and she was sent to ED to get blood transfusion. Pt has chronic anemia since May 2020 and was hospitalised at that time for anemia causing heart failure. Pt got EGD/Colonoscopy in May 2020 at [facility] and it was normal. Pt was admitted recently to [facility] in Feb and she got 2 units blood and was discharged with Hb of 9. Pt complains of generalized weakness, fatigue and palpitations. Pt denies any abdominal pain, bleeding, use of NSAIDS, chest pain, SOB, dizziness or any other acute complaints.    History as above , pt who has a h/o COVID pneumonia in recent past , she was admitted because of symptomatic anemia and SOB, asked to eval her as her CT chest showed some interstitial and reticulonodular infiltrates and one possible cavitary lesion, the patient is not SOB at all at this time and has no coughing or chest pain, she has no fevers or chills or other complaints.      PAST MEDICAL & SURGICAL HISTORY:  Anemia    Hypertension        penicillin (Hives)      Meds:  acetaminophen   Tablet .. 650 milliGRAM(s) Oral every 6 hours PRN  atorvastatin 80 milliGRAM(s) Oral at bedtime  carvedilol 12.5 milliGRAM(s) Oral every 12 hours  cholecalciferol 1000 Unit(s) Oral daily  metoprolol tartrate Injectable 5 milliGRAM(s) IV Push every 6 hours PRN  pantoprazole  Injectable 40 milliGRAM(s) IV Push daily      SOCIAL HISTORY:  Smoker:  no  ETOH use:  no      FAMILY HISTORY: not sig      VITALS:  Vital Signs Last 24 Hrs  T(C): 36.7 (20 Mar 2021 13:46), Max: 37 (20 Mar 2021 04:48)  T(F): 98 (20 Mar 2021 13:46), Max: 98.6 (20 Mar 2021 04:48)  HR: 80 (20 Mar 2021 17:39) (72 - 80)  BP: 178/56 (20 Mar 2021 17:39) (105/42 - 178/56)  BP(mean): --  RR: 17 (20 Mar 2021 17:39) (16 - 18)  SpO2: 99% (20 Mar 2021 17:39) (97% - 99%)    LABS/DIAGNOSTIC TESTS:                          8.4    6.06  )-----------( x        ( 20 Mar 2021 18:29 )             26.9     WBC Count: 6.06 K/uL (03-20 @ 18:29)  WBC Count: 6.93 K/uL (03-20 @ 07:21)  WBC Count: 6.01 K/uL (03-19 @ 18:37)  WBC Count: 5.99 K/uL (03-19 @ 09:01)      03-20    137  |  104  |  18  ----------------------------<  83  3.8   |  25  |  0.98    Ca    8.8      20 Mar 2021 07:21  Phos  3.9     03-20  Mg     2.4     03-20    TPro  6.7  /  Alb  2.8<L>  /  TBili  0.7  /  DBili  x   /  AST  18  /  ALT  19  /  AlkPhos  91  03-20          LIVER FUNCTIONS - ( 20 Mar 2021 07:21 )  Alb: 2.8 g/dL / Pro: 6.7 g/dL / ALK PHOS: 91 U/L / ALT: 19 U/L DA / AST: 18 U/L / GGT: x             PT/INR - ( 19 Mar 2021 09:01 )   PT: 11.4 sec;   INR: 0.96 ratio         PTT - ( 19 Mar 2021 09:01 )  PTT:31.6 sec    LACTATE:    ABG -     CULTURES:       RADIOLOGY:< from: CT Chest w/ IV Cont (03.19.21 @ 18:41) >  EXAM:  CT ABDOMEN AND PELVIS IC                          EXAM:  CT CHEST IC                            PROCEDURE DATE:  03/19/2021          INTERPRETATION:  CLINICAL INFORMATION: Anemia. Evaluate for malignancy.    COMPARISON: None.    CONTRAST/COMPLICATIONS:  IV Contrast: Omnipaque 350  90 cc administered   10 cc discarded  Oral Contrast: NONE  Complications: None reported at time of study completion    PROCEDURE:  CT of the Chest, Abdomen and Pelvis was performed.  Sagittal and coronal reformats were performed.    FINDINGS:  CHEST:  LUNGS AND LARGE AIRWAYS: Patent central airways. Diffuse interlobular septal thickening with numerous scattered small ill-defined peripheral opacities as well as impacted distal airways in the left upper lobe.Few tiny nodular opacities noted, at least one of which is cavitary, measuring 6 mm in the superior segment of the right lower lobe (2, 61). Left upper lobe calcifications, likely from prior infection. Linear opacity in the left upper lobe, possibly scarring.  PLEURA: No pleural effusion.  VESSELS: Aortic, aortic valvular and coronary artery calcifications.  HEART: Heart size is normal. No pericardial effusion.  MEDIASTINUM AND LOBO: No mediastinal lymphadenopathy. 1.5 x 1.0 cm mild right hilar adenopathy.  CHEST WALL AND LOWER NECK: 1.3 cm hypodense right thyroid nodule which could be better evaluated with ultrasound. Indeterminate 2 cm superficial right lower anterior chest wall nodule (series 2, image 80).    ABDOMEN AND PELVIS:  LIVER: Within normal limits.  BILE DUCTS: Normal caliber.  GALLBLADDER: Within normal limits.  SPLEEN: Within normal limits.  PANCREAS: Within normal limits.  ADRENALS: Within normal limits.  KIDNEYS/URETERS: Atrophic left kidney. Bilateral renal cysts and subcentimeter right renal hypodense foci that are too small to characterize. Right upper pole caliectasis without rachel hydronephrosis and without ureteral dilatation.    BLADDER: Within normal limits.  REPRODUCTIVE ORGANS: Heterogeneous uterus, probablyon the basis of small fibroids. No adnexal mass.    BOWEL: Small hiatal hernia. No bowel obstruction. Appendix is normal.  PERITONEUM: No ascites.  VESSELS: Atherosclerotic changes.  RETROPERITONEUM/LYMPH NODES: No lymphadenopathy.  ABDOMINAL WALL: Within normal limits.  BONES: Degenerative changes.    IMPRESSION:  Nonspecific diffuse interlobular septal thickening with scattered small ill-defined peripheral opacities and scattered nodules, at least one of which is cavitary, question infection. Left hilar adenopathy.    Atrophic left kidney. Right upper pole caliectasis of unknown etiology. Correlate with urine cytology and as necessary, CT urogram.    Right thyroid nodule, which could be better evaluated with ultrasound.    Superficial indeterminate right midline anterior chest wall nodule, that should be further assessed upon clinical/dermatologic grounds.            MARGARET SCHROEDER MD; Attending Radiologist  This document has been electronically signed. Mar 19 2021  6:53PM    < end of copied text >        ROS  [  ] UNABLE TO ELICIT

## 2021-03-21 LAB
ALBUMIN SERPL ELPH-MCNC: 2.7 G/DL — LOW (ref 3.5–5)
ALP SERPL-CCNC: 87 U/L — SIGNIFICANT CHANGE UP (ref 40–120)
ALT FLD-CCNC: 19 U/L DA — SIGNIFICANT CHANGE UP (ref 10–60)
ANION GAP SERPL CALC-SCNC: 9 MMOL/L — SIGNIFICANT CHANGE UP (ref 5–17)
AST SERPL-CCNC: 17 U/L — SIGNIFICANT CHANGE UP (ref 10–40)
BASOPHILS # BLD AUTO: 0.01 K/UL — SIGNIFICANT CHANGE UP (ref 0–0.2)
BASOPHILS NFR BLD AUTO: 0.2 % — SIGNIFICANT CHANGE UP (ref 0–2)
BILIRUB SERPL-MCNC: 0.6 MG/DL — SIGNIFICANT CHANGE UP (ref 0.2–1.2)
BUN SERPL-MCNC: 20 MG/DL — HIGH (ref 7–18)
CALCIUM SERPL-MCNC: 8.6 MG/DL — SIGNIFICANT CHANGE UP (ref 8.4–10.5)
CHLORIDE SERPL-SCNC: 101 MMOL/L — SIGNIFICANT CHANGE UP (ref 96–108)
CO2 SERPL-SCNC: 27 MMOL/L — SIGNIFICANT CHANGE UP (ref 22–31)
CREAT SERPL-MCNC: 0.97 MG/DL — SIGNIFICANT CHANGE UP (ref 0.5–1.3)
EOSINOPHIL # BLD AUTO: 0.21 K/UL — SIGNIFICANT CHANGE UP (ref 0–0.5)
EOSINOPHIL NFR BLD AUTO: 3.6 % — SIGNIFICANT CHANGE UP (ref 0–6)
GLUCOSE SERPL-MCNC: 102 MG/DL — HIGH (ref 70–99)
HCT VFR BLD CALC: 26.5 % — LOW (ref 34.5–45)
HCT VFR BLD CALC: 28.2 % — LOW (ref 34.5–45)
HGB BLD-MCNC: 8.3 G/DL — LOW (ref 11.5–15.5)
HGB BLD-MCNC: 8.6 G/DL — LOW (ref 11.5–15.5)
IMM GRANULOCYTES NFR BLD AUTO: 0.7 % — SIGNIFICANT CHANGE UP (ref 0–1.5)
LYMPHOCYTES # BLD AUTO: 1.92 K/UL — SIGNIFICANT CHANGE UP (ref 1–3.3)
LYMPHOCYTES # BLD AUTO: 33 % — SIGNIFICANT CHANGE UP (ref 13–44)
MAGNESIUM SERPL-MCNC: 2.3 MG/DL — SIGNIFICANT CHANGE UP (ref 1.6–2.6)
MCHC RBC-ENTMCNC: 27.2 PG — SIGNIFICANT CHANGE UP (ref 27–34)
MCHC RBC-ENTMCNC: 27.9 PG — SIGNIFICANT CHANGE UP (ref 27–34)
MCHC RBC-ENTMCNC: 30.5 GM/DL — LOW (ref 32–36)
MCHC RBC-ENTMCNC: 31.3 GM/DL — LOW (ref 32–36)
MCV RBC AUTO: 88.9 FL — SIGNIFICANT CHANGE UP (ref 80–100)
MCV RBC AUTO: 89.2 FL — SIGNIFICANT CHANGE UP (ref 80–100)
MONOCYTES # BLD AUTO: 0.47 K/UL — SIGNIFICANT CHANGE UP (ref 0–0.9)
MONOCYTES NFR BLD AUTO: 8.1 % — SIGNIFICANT CHANGE UP (ref 2–14)
NEUTROPHILS # BLD AUTO: 3.17 K/UL — SIGNIFICANT CHANGE UP (ref 1.8–7.4)
NEUTROPHILS NFR BLD AUTO: 54.4 % — SIGNIFICANT CHANGE UP (ref 43–77)
NRBC # BLD: 0 /100 WBCS — SIGNIFICANT CHANGE UP (ref 0–0)
NRBC # BLD: 0 /100 WBCS — SIGNIFICANT CHANGE UP (ref 0–0)
PHOSPHATE SERPL-MCNC: 3.8 MG/DL — SIGNIFICANT CHANGE UP (ref 2.5–4.5)
PLATELET # BLD AUTO: 47 K/UL — LOW (ref 150–400)
PLATELET # BLD AUTO: 53 K/UL — LOW (ref 150–400)
POTASSIUM SERPL-MCNC: 3.6 MMOL/L — SIGNIFICANT CHANGE UP (ref 3.5–5.3)
POTASSIUM SERPL-SCNC: 3.6 MMOL/L — SIGNIFICANT CHANGE UP (ref 3.5–5.3)
PROT SERPL-MCNC: 6.5 G/DL — SIGNIFICANT CHANGE UP (ref 6–8.3)
RBC # BLD: 2.98 M/UL — LOW (ref 3.8–5.2)
RBC # BLD: 3.16 M/UL — LOW (ref 3.8–5.2)
RBC # FLD: 15.6 % — HIGH (ref 10.3–14.5)
RBC # FLD: 15.8 % — HIGH (ref 10.3–14.5)
SODIUM SERPL-SCNC: 137 MMOL/L — SIGNIFICANT CHANGE UP (ref 135–145)
WBC # BLD: 5.82 K/UL — SIGNIFICANT CHANGE UP (ref 3.8–10.5)
WBC # BLD: 6.22 K/UL — SIGNIFICANT CHANGE UP (ref 3.8–10.5)
WBC # FLD AUTO: 5.82 K/UL — SIGNIFICANT CHANGE UP (ref 3.8–10.5)
WBC # FLD AUTO: 6.22 K/UL — SIGNIFICANT CHANGE UP (ref 3.8–10.5)

## 2021-03-21 PROCEDURE — 99233 SBSQ HOSP IP/OBS HIGH 50: CPT

## 2021-03-21 RX ORDER — LANOLIN ALCOHOL/MO/W.PET/CERES
3 CREAM (GRAM) TOPICAL ONCE
Refills: 0 | Status: COMPLETED | OUTPATIENT
Start: 2021-03-21 | End: 2021-03-21

## 2021-03-21 RX ORDER — IRON SUCROSE 20 MG/ML
100 INJECTION, SOLUTION INTRAVENOUS ONCE
Refills: 0 | Status: DISCONTINUED | OUTPATIENT
Start: 2021-03-21 | End: 2021-03-21

## 2021-03-21 RX ORDER — IRON SUCROSE 20 MG/ML
100 INJECTION, SOLUTION INTRAVENOUS EVERY 24 HOURS
Refills: 0 | Status: DISCONTINUED | OUTPATIENT
Start: 2021-03-21 | End: 2021-03-22

## 2021-03-21 RX ADMIN — ATORVASTATIN CALCIUM 80 MILLIGRAM(S): 80 TABLET, FILM COATED ORAL at 21:10

## 2021-03-21 RX ADMIN — CARVEDILOL PHOSPHATE 12.5 MILLIGRAM(S): 80 CAPSULE, EXTENDED RELEASE ORAL at 18:17

## 2021-03-21 RX ADMIN — PANTOPRAZOLE SODIUM 40 MILLIGRAM(S): 20 TABLET, DELAYED RELEASE ORAL at 13:04

## 2021-03-21 RX ADMIN — Medication 1000 UNIT(S): at 13:04

## 2021-03-21 RX ADMIN — Medication 3 MILLIGRAM(S): at 22:56

## 2021-03-21 RX ADMIN — CARVEDILOL PHOSPHATE 12.5 MILLIGRAM(S): 80 CAPSULE, EXTENDED RELEASE ORAL at 06:00

## 2021-03-21 RX ADMIN — IRON SUCROSE 210 MILLIGRAM(S): 20 INJECTION, SOLUTION INTRAVENOUS at 13:03

## 2021-03-21 NOTE — CONSULT NOTE ADULT - SUBJECTIVE AND OBJECTIVE BOX
Patient is a 75y old  Female who presents with a chief complaint of     HPI:  75 yr F with PMH of HTN, HLD, right sided Carotid artery 70% stenosis, anemia induced HF in 2020, chronic anemia was sent to ED for low Hb. Pt went to see Dr. Mosley yesterday for her chronic anemia and her Hb was 6.3 and she was sent to ED to get blood transfusion. Pt has chronic anemia since May 2020 and was hospitalised at that time for anemia causing heart failure. Pt got EGD/Colonoscopy in May 2020 at [facility] and it was normal. Pt was admitted recently to [facility] in Feb and she got 2 units blood and was discharged with Hb of 9. Pt complains of generalized weakness, fatigue and palpitations. Pt denies any abdominal pain, bleeding, use of NSAIDS, chest pain, SOB, dizziness or any other acute complaints.  she also jhas thrombocytopenia.  The platelet on 3/2 was 50.  In ED, /50, HR 68 (19 Mar 2021 12:43)       ROS:  Negative except for:    PAST MEDICAL & SURGICAL HISTORY:  Anemia    Hypertension        SOCIAL HISTORY:    FAMILY HISTORY:      MEDICATIONS  (STANDING):  atorvastatin 80 milliGRAM(s) Oral at bedtime  carvedilol 12.5 milliGRAM(s) Oral every 12 hours  cholecalciferol 1000 Unit(s) Oral daily  pantoprazole  Injectable 40 milliGRAM(s) IV Push daily    MEDICATIONS  (PRN):  acetaminophen   Tablet .. 650 milliGRAM(s) Oral every 6 hours PRN Moderate Pain (4 - 6)  metoprolol tartrate Injectable 5 milliGRAM(s) IV Push every 6 hours PRN for HR>100      Allergies    penicillin (Hives)    Intolerances        Vital Signs Last 24 Hrs  T(C): 36.7 (21 Mar 2021 05:45), Max: 36.8 (20 Mar 2021 20:55)  T(F): 98.1 (21 Mar 2021 05:45), Max: 98.3 (20 Mar 2021 20:55)  HR: 73 (21 Mar 2021 05:45) (72 - 80)  BP: 145/55 (21 Mar 2021 05:45) (118/56 - 178/56)  BP(mean): --  RR: 17 (21 Mar 2021 05:45) (16 - 17)  SpO2: 99% (21 Mar 2021 05:45) (98% - 99%)    PHYSICAL EXAM  General: adult in NAD  HEENT: clear oropharynx, anicteric sclera, pink conjunctiva  Neck: supple  CV: normal S1/S2 with no murmur rubs or gallops  Lungs: positive air movement b/l ant lungs,clear to auscultation, no wheezes, no rales  Abdomen: soft non-tender non-distended, no hepatosplenomegaly  Ext: no clubbing cyanosis or edema  Skin: no rashes and no petechiae  Neuro: alert and oriented X 4, no focal deficits      LABS:                          8.3    5.82  )-----------( 47       ( 21 Mar 2021 08:23 )             26.5         Mean Cell Volume : 88.9 fl  Mean Cell Hemoglobin : 27.9 pg  Mean Cell Hemoglobin Concentration : 31.3 gm/dL  Auto Neutrophil # : 3.17 K/uL  Auto Lymphocyte # : 1.92 K/uL  Auto Monocyte # : 0.47 K/uL  Auto Eosinophil # : 0.21 K/uL  Auto Basophil # : 0.01 K/uL  Auto Neutrophil % : 54.4 %  Auto Lymphocyte % : 33.0 %  Auto Monocyte % : 8.1 %  Auto Eosinophil % : 3.6 %  Auto Basophil % : 0.2 %      Serial CBC's  03-21 @ 08:23  Hct-26.5 / Hgb-8.3 / Plat-47 / RBC-2.98 / WBC-5.82  Serial CBC's  03-20 @ 23:58  Hct-28.2 / Hgb-8.6 / Plat-53 / RBC-3.16 / WBC-6.22  Serial CBC's  03-20 @ 18:29  Hct-26.9 / Hgb-8.4 / Plat-54 / RBC-2.98 / WBC-6.06  Serial CBC's  03-20 @ 07:21  Hct-28.9 / Hgb-9.0 / Plat-65 / RBC-3.27 / WBC-6.93  Serial CBC's  03-19 @ 18:37  Hct-28.8 / Hgb-8.9 / Plat-58 / RBC-3.24 / WBC-6.01  Serial CBC's  03-19 @ 14:55  Hct--- / Hgb--- / Plat--- / RBC-2.10 / WBC---  Serial CBC's  03-19 @ 09:01  Hct-20.0 / Hgb-5.6 / Plat-24 / RBC-2.09 / WBC-5.99      03-21    137  |  101  |  20<H>  ----------------------------<  102<H>  3.6   |  27  |  0.97    Ca    8.6      21 Mar 2021 08:23  Phos  3.8     03-21  Mg     2.3     03-21    TPro  6.5  /  Alb  2.7<L>  /  TBili  0.6  /  DBili  x   /  AST  17  /  ALT  19  /  AlkPhos  87  03-21          Ferritin, Serum: 33 ng/mL (03-19 @ 19:46)  Folate, Serum: 16.2 ng/mL (03-19 @ 19:46)  Vitamin B12, Serum: 380 pg/mL (03-19 @ 19:46)  Iron - Total Binding Capacity.: 326 ug/dL (03-19 @ 15:02)  Reticulocyte Percent: 4.4 % (03-19 @ 14:55)              BLOOD SMEAR INTERPRETATION:       RADIOLOGY & ADDITIONAL STUDIES:    < from: CT Abdomen and Pelvis w/ IV Cont (03.19.21 @ 18:45) >  FINDINGS:  CHEST:  LUNGS AND LARGE AIRWAYS: Patent central airways. Diffuse interlobular septal thickening with numerous scattered small ill-defined peripheral opacities as well as impacted distal airways in the left upper lobe.Few tiny nodular opacities noted, at least one of which is cavitary, measuring 6 mm in the superior segment of the right lower lobe (2, 61). Left upper lobe calcifications, likely from prior infection. Linear opacity in the left upper lobe, possibly scarring.  PLEURA: No pleural effusion.  VESSELS: Aortic, aortic valvular and coronary artery calcifications.  HEART: Heart size is normal. No pericardial effusion.  MEDIASTINUM AND LOBO: No mediastinal lymphadenopathy. 1.5 x 1.0 cm mild right hilar adenopathy.  CHEST WALL AND LOWER NECK: 1.3 cm hypodense right thyroid nodule which could be better evaluated with ultrasound. Indeterminate 2 cm superficial right lower anterior chest wall nodule (series 2, image 80).    ABDOMEN AND PELVIS:  LIVER: Within normal limits.  BILE DUCTS: Normal caliber.  GALLBLADDER: Within normal limits.  SPLEEN: Within normal limits.  PANCREAS: Within normal limits.  ADRENALS: Within normal limits.  KIDNEYS/URETERS: Atrophic left kidney. Bilateral renal cysts and subcentimeter right renal hypodense foci that are too small to characterize. Right upper pole caliectasis without rachel hydronephrosis and without ureteral dilatation.      < end of copied text >  < from: CT Abdomen and Pelvis w/ IV Cont (03.19.21 @ 18:45) >  BLADDER: Within normal limits.  REPRODUCTIVE ORGANS: Heterogeneous uterus, probablyon the basis of small fibroids. No adnexal mass.    BOWEL: Small hiatal hernia. No bowel obstruction. Appendix is normal.  PERITONEUM: No ascites.  VESSELS: Atherosclerotic changes.  RETROPERITONEUM/LYMPH NODES: No lymphadenopathy.  ABDOMINAL WALL: Within normal limits.  BONES: Degenerative changes.    IMPRESSION:  Nonspecific diffuse interlobular septal thickening with scattered small ill-defined peripheral opacities and scattered nodules, at least one of which is cavitary, question infection. Left hilar adenopathy.    < end of copied text >  < from: CT Abdomen and Pelvis w/ IV Cont (03.19.21 @ 18:45) >  Atrophic left kidney. Right upper pole caliectasis of unknown etiology. Correlate with urine cytology and as necessary, CT urogram.    Right thyroid nodule, which could be better evaluated with ultrasound.    Superficial indeterminate right midline anterior chest wall nodule, that should be further assessed upon clinical/dermatologic grounds.      < end of copied text >

## 2021-03-22 ENCOUNTER — TRANSCRIPTION ENCOUNTER (OUTPATIENT)
Age: 76
End: 2021-03-22

## 2021-03-22 VITALS
OXYGEN SATURATION: 98 % | RESPIRATION RATE: 16 BRPM | TEMPERATURE: 98 F | SYSTOLIC BLOOD PRESSURE: 128 MMHG | DIASTOLIC BLOOD PRESSURE: 53 MMHG | HEART RATE: 73 BPM

## 2021-03-22 DIAGNOSIS — U07.1 COVID-19: ICD-10-CM

## 2021-03-22 LAB
ALBUMIN SERPL ELPH-MCNC: 2.9 G/DL — LOW (ref 3.5–5)
ALP SERPL-CCNC: 91 U/L — SIGNIFICANT CHANGE UP (ref 40–120)
ALT FLD-CCNC: 19 U/L DA — SIGNIFICANT CHANGE UP (ref 10–60)
ANION GAP SERPL CALC-SCNC: 7 MMOL/L — SIGNIFICANT CHANGE UP (ref 5–17)
AST SERPL-CCNC: 19 U/L — SIGNIFICANT CHANGE UP (ref 10–40)
BASOPHILS # BLD AUTO: 0.01 K/UL — SIGNIFICANT CHANGE UP (ref 0–0.2)
BASOPHILS NFR BLD AUTO: 0.2 % — SIGNIFICANT CHANGE UP (ref 0–2)
BILIRUB SERPL-MCNC: 0.6 MG/DL — SIGNIFICANT CHANGE UP (ref 0.2–1.2)
BUN SERPL-MCNC: 19 MG/DL — HIGH (ref 7–18)
CALCIUM SERPL-MCNC: 8.7 MG/DL — SIGNIFICANT CHANGE UP (ref 8.4–10.5)
CHLORIDE SERPL-SCNC: 103 MMOL/L — SIGNIFICANT CHANGE UP (ref 96–108)
CO2 SERPL-SCNC: 27 MMOL/L — SIGNIFICANT CHANGE UP (ref 22–31)
CREAT SERPL-MCNC: 0.92 MG/DL — SIGNIFICANT CHANGE UP (ref 0.5–1.3)
EOSINOPHIL # BLD AUTO: 0.17 K/UL — SIGNIFICANT CHANGE UP (ref 0–0.5)
EOSINOPHIL NFR BLD AUTO: 3.3 % — SIGNIFICANT CHANGE UP (ref 0–6)
GLUCOSE SERPL-MCNC: 91 MG/DL — SIGNIFICANT CHANGE UP (ref 70–99)
HCT VFR BLD CALC: 27.5 % — LOW (ref 34.5–45)
HGB BLD-MCNC: 8.5 G/DL — LOW (ref 11.5–15.5)
IMM GRANULOCYTES NFR BLD AUTO: 0.6 % — SIGNIFICANT CHANGE UP (ref 0–1.5)
LYMPHOCYTES # BLD AUTO: 1.6 K/UL — SIGNIFICANT CHANGE UP (ref 1–3.3)
LYMPHOCYTES # BLD AUTO: 30.8 % — SIGNIFICANT CHANGE UP (ref 13–44)
MAGNESIUM SERPL-MCNC: 2.2 MG/DL — SIGNIFICANT CHANGE UP (ref 1.6–2.6)
MCHC RBC-ENTMCNC: 27.8 PG — SIGNIFICANT CHANGE UP (ref 27–34)
MCHC RBC-ENTMCNC: 30.9 GM/DL — LOW (ref 32–36)
MCV RBC AUTO: 89.9 FL — SIGNIFICANT CHANGE UP (ref 80–100)
MONOCYTES # BLD AUTO: 0.57 K/UL — SIGNIFICANT CHANGE UP (ref 0–0.9)
MONOCYTES NFR BLD AUTO: 11 % — SIGNIFICANT CHANGE UP (ref 2–14)
NEUTROPHILS # BLD AUTO: 2.81 K/UL — SIGNIFICANT CHANGE UP (ref 1.8–7.4)
NEUTROPHILS NFR BLD AUTO: 54.1 % — SIGNIFICANT CHANGE UP (ref 43–77)
NRBC # BLD: 0 /100 WBCS — SIGNIFICANT CHANGE UP (ref 0–0)
PHOSPHATE SERPL-MCNC: 3.6 MG/DL — SIGNIFICANT CHANGE UP (ref 2.5–4.5)
PLATELET # BLD AUTO: 38 K/UL — LOW (ref 150–400)
POTASSIUM SERPL-MCNC: 3.6 MMOL/L — SIGNIFICANT CHANGE UP (ref 3.5–5.3)
POTASSIUM SERPL-SCNC: 3.6 MMOL/L — SIGNIFICANT CHANGE UP (ref 3.5–5.3)
PROT SERPL-MCNC: 6.9 G/DL — SIGNIFICANT CHANGE UP (ref 6–8.3)
RBC # BLD: 3.06 M/UL — LOW (ref 3.8–5.2)
RBC # FLD: 16 % — HIGH (ref 10.3–14.5)
SODIUM SERPL-SCNC: 137 MMOL/L — SIGNIFICANT CHANGE UP (ref 135–145)
WBC # BLD: 5.19 K/UL — SIGNIFICANT CHANGE UP (ref 3.8–10.5)
WBC # FLD AUTO: 5.19 K/UL — SIGNIFICANT CHANGE UP (ref 3.8–10.5)

## 2021-03-22 PROCEDURE — 85045 AUTOMATED RETICULOCYTE COUNT: CPT

## 2021-03-22 PROCEDURE — 86803 HEPATITIS C AB TEST: CPT

## 2021-03-22 PROCEDURE — 86900 BLOOD TYPING SEROLOGIC ABO: CPT

## 2021-03-22 PROCEDURE — 99222 1ST HOSP IP/OBS MODERATE 55: CPT

## 2021-03-22 PROCEDURE — 86923 COMPATIBILITY TEST ELECTRIC: CPT

## 2021-03-22 PROCEDURE — 82272 OCCULT BLD FECES 1-3 TESTS: CPT

## 2021-03-22 PROCEDURE — 71260 CT THORAX DX C+: CPT

## 2021-03-22 PROCEDURE — 84100 ASSAY OF PHOSPHORUS: CPT

## 2021-03-22 PROCEDURE — 83010 ASSAY OF HAPTOGLOBIN QUANT: CPT

## 2021-03-22 PROCEDURE — 84443 ASSAY THYROID STIM HORMONE: CPT

## 2021-03-22 PROCEDURE — 83550 IRON BINDING TEST: CPT

## 2021-03-22 PROCEDURE — 85610 PROTHROMBIN TIME: CPT

## 2021-03-22 PROCEDURE — 74177 CT ABD & PELVIS W/CONTRAST: CPT

## 2021-03-22 PROCEDURE — 82607 VITAMIN B-12: CPT

## 2021-03-22 PROCEDURE — 85027 COMPLETE CBC AUTOMATED: CPT

## 2021-03-22 PROCEDURE — 85730 THROMBOPLASTIN TIME PARTIAL: CPT

## 2021-03-22 PROCEDURE — 80048 BASIC METABOLIC PNL TOTAL CA: CPT

## 2021-03-22 PROCEDURE — 85025 COMPLETE CBC W/AUTO DIFF WBC: CPT

## 2021-03-22 PROCEDURE — 82728 ASSAY OF FERRITIN: CPT

## 2021-03-22 PROCEDURE — 83735 ASSAY OF MAGNESIUM: CPT

## 2021-03-22 PROCEDURE — 83036 HEMOGLOBIN GLYCOSYLATED A1C: CPT

## 2021-03-22 PROCEDURE — 82746 ASSAY OF FOLIC ACID SERUM: CPT

## 2021-03-22 PROCEDURE — 36430 TRANSFUSION BLD/BLD COMPNT: CPT

## 2021-03-22 PROCEDURE — 83615 LACTATE (LD) (LDH) ENZYME: CPT

## 2021-03-22 PROCEDURE — 93005 ELECTROCARDIOGRAM TRACING: CPT

## 2021-03-22 PROCEDURE — 86901 BLOOD TYPING SEROLOGIC RH(D): CPT

## 2021-03-22 PROCEDURE — 87635 SARS-COV-2 COVID-19 AMP PRB: CPT

## 2021-03-22 PROCEDURE — 99239 HOSP IP/OBS DSCHRG MGMT >30: CPT | Mod: GC

## 2021-03-22 PROCEDURE — 80053 COMPREHEN METABOLIC PANEL: CPT

## 2021-03-22 PROCEDURE — P9040: CPT

## 2021-03-22 PROCEDURE — 36415 COLL VENOUS BLD VENIPUNCTURE: CPT

## 2021-03-22 PROCEDURE — 86850 RBC ANTIBODY SCREEN: CPT

## 2021-03-22 PROCEDURE — 80061 LIPID PANEL: CPT

## 2021-03-22 PROCEDURE — 99285 EMERGENCY DEPT VISIT HI MDM: CPT | Mod: 25

## 2021-03-22 PROCEDURE — 83540 ASSAY OF IRON: CPT

## 2021-03-22 PROCEDURE — P9037: CPT

## 2021-03-22 RX ORDER — JNJ-78436735 50000000000 [PFU]/.5ML
0.5 SUSPENSION INTRAMUSCULAR ONCE
Refills: 0 | Status: DISCONTINUED | OUTPATIENT
Start: 2021-03-22 | End: 2021-03-22

## 2021-03-22 RX ORDER — PANTOPRAZOLE SODIUM 20 MG/1
40 TABLET, DELAYED RELEASE ORAL
Refills: 0 | Status: DISCONTINUED | OUTPATIENT
Start: 2021-03-22 | End: 2021-03-22

## 2021-03-22 RX ORDER — PANTOPRAZOLE SODIUM 20 MG/1
1 TABLET, DELAYED RELEASE ORAL
Qty: 0 | Refills: 0 | DISCHARGE

## 2021-03-22 RX ORDER — PANTOPRAZOLE SODIUM 20 MG/1
1 TABLET, DELAYED RELEASE ORAL
Qty: 0 | Refills: 0 | DISCHARGE
Start: 2021-03-22

## 2021-03-22 RX ORDER — ASPIRIN/CALCIUM CARB/MAGNESIUM 324 MG
1 TABLET ORAL
Qty: 0 | Refills: 0 | DISCHARGE

## 2021-03-22 RX ADMIN — Medication 1000 UNIT(S): at 12:04

## 2021-03-22 RX ADMIN — PANTOPRAZOLE SODIUM 40 MILLIGRAM(S): 20 TABLET, DELAYED RELEASE ORAL at 12:04

## 2021-03-22 RX ADMIN — CARVEDILOL PHOSPHATE 12.5 MILLIGRAM(S): 80 CAPSULE, EXTENDED RELEASE ORAL at 06:25

## 2021-03-22 RX ADMIN — IRON SUCROSE 210 MILLIGRAM(S): 20 INJECTION, SOLUTION INTRAVENOUS at 12:15

## 2021-03-22 NOTE — DISCHARGE NOTE PROVIDER - HOSPITAL COURSE
75F PMH HTN, HLD, 70% r-ICA stenosis, chronic anemia c/b HF 2020, s/p wnl colonoscopy 5/20 sent by Dr. Mosley 3/19 for Hb 6.3 during routine heme follow up, with weakness, fatigue, and palpitaitons. Of note, s/p recent adm [facility] 2/21, received x2 units pRBC, dsc Hb ~9. In ED, BP variable 93//54, otherwise wnl. Labs Hb 5.6, PLT 24, given x2 units pRBC and x1 unit PLT. CT chest notable for septal thickening, peripheral opacities, and left hilar adenopathy, per ID Dr. Wilder not concerning for infectious etiology, per pulm Dr. Villaseñor concerning for amyloidosis. CT a/p notable for r upper renal caliectasis, r thyroid nodule, and superficial anterior chest wall nodule, will rec outpatient urology and primary care follow up.     Will recommend outpatient follow up with heme/onc Dr. Mosley for BM bx and GI Dr. Leal for capsule endoscopy. Aspirin held, PPI started.

## 2021-03-22 NOTE — DISCHARGE NOTE PROVIDER - NSDCCPCAREPLAN_GEN_ALL_CORE_FT
PRINCIPAL DISCHARGE DIAGNOSIS  Diagnosis: Anemia, unspecified type  Assessment and Plan of Treatment: You came to the hospital with weakness and fatigue due to low blood levels, or anemia. You were given two units of red blood cells and one unit of platelets, and have stable numbers and vital signs at time of discharge. Recent endoscopy and colonoscopy did not show any reasons for bleeding. Take PANTOPRAZOLE 40mg ONCE A DAY to help reduce bleeding risk. STOP taking your home ASPIRIN, as this can worsen bleeding. Follow up with GI Dr. Leal within 1 week of discharge to arrange for a capsule endoscopy, which can visualize the entire GI tract and provide more information that a traditional endoscopy.   Imaging of your chest also showed findings concerning for a condition known as amyloidosis, in which a type of protein builds up in various organ systems, including the bone marrow, causing disruptions such as anemia and low platelets. Follow up with your heme/onc specialist Dr. Mosley within 1 week of discharge for a bone marroe biopsy.  Seek prompt medical attention if you begin to experience worsening weakness, lightheadedness, or fatigue, or if you notice large volumes of blood in stool, urine, or vomitus.         SECONDARY DISCHARGE DIAGNOSES  Diagnosis: HTN (hypertension)  Assessment and Plan of Treatment: Continue your home carvedilol as previously, with routine follow up to monitor your blood pressure and make any changes as needed.    Diagnosis: HLD (hyperlipidemia)  Assessment and Plan of Treatment: Continue your home Lipitor as previously, with routine follow up to monitor your blood cholesterol and make any changes as needed. You may also continue to take your daily vitamin D supplements.    Diagnosis: Imaging finding  Assessment and Plan of Treatment: Imaging of your chest and abdomen showed opacities in your lungs and swelling of your lymph nodes that may represent amyloidosis, as discussed above. Follow up with pulmonologist Dr. Villaseñor within 1-2 weeks of discharge to undergo further evaluation for this condition.   Imaging was also notable for thickening in the upper portion of your right kidney, for which we recommend a follow up appointment with your primary care doctor or urologist to obtain more detailed imaging and recommendations.   A nodule was noted on the right side of your thyroid, and another on your right front chest wall, for which we recommend follow up with your primary care doctor for further evaluation and imaging.     PRINCIPAL DISCHARGE DIAGNOSIS  Diagnosis: Anemia, unspecified type  Assessment and Plan of Treatment: You came to the hospital with weakness and fatigue due to low blood levels, or anemia. You were given two units of red blood cells and one unit of platelets, and have stable numbers and vital signs at time of discharge. Recent endoscopy and colonoscopy did not show any reasons for bleeding. Take PANTOPRAZOLE 40mg ONCE A DAY to help reduce bleeding risk. STOP taking your home ASPIRIN, as this can worsen bleeding. Follow up with GI Dr. Leal at [phone] as soon as possible to arrange for a capsule endoscopy, which can visualize the entire GI tract and provide more information that a traditional endoscopy.   Imaging of your chest also showed findings concerning for a condition known as amyloidosis, in which a type of protein builds up in various organ systems, including the bone marrow, causing disruptions such as anemia and low platelets. Follow up with your heme/onc specialist Dr. Mosley within 1 week of discharge for a bone marrow biopsy.  Seek prompt medical attention if you begin to experience worsening weakness, lightheadedness, or fatigue, or if you notice large volumes of blood in stool, urine, or vomitus.         SECONDARY DISCHARGE DIAGNOSES  Diagnosis: HTN (hypertension)  Assessment and Plan of Treatment: Continue your home carvedilol as previously, with routine follow up to monitor your blood pressure and make any changes as needed.    Diagnosis: HLD (hyperlipidemia)  Assessment and Plan of Treatment: Continue your home Lipitor as previously, with routine follow up to monitor your blood cholesterol and make any changes as needed. You may also continue to take your daily vitamin D supplements.    Diagnosis: Imaging finding  Assessment and Plan of Treatment: Imaging of your chest and abdomen showed opacities in your lungs and swelling of your lymph nodes that may represent amyloidosis, as discussed above. Follow up with pulmonologist Dr. Villaseñor within 1-2 weeks of discharge to undergo further evaluation for this condition.   Imaging was also notable for thickening in the upper portion of your right kidney, for which we recommend a follow up appointment with your primary care doctor or urologist to obtain more detailed imaging and recommendations.   A nodule was noted on the right side of your thyroid, and another on your right front chest wall, for which we recommend follow up with your primary care doctor for further evaluation and imaging.     PRINCIPAL DISCHARGE DIAGNOSIS  Diagnosis: Anemia, unspecified type  Assessment and Plan of Treatment: You came to the hospital with weakness and fatigue due to low blood levels, or anemia. You were given two units of red blood cells and one unit of platelets, and have stable numbers and vital signs at time of discharge. Recent endoscopy and colonoscopy did not show any reasons for bleeding. Take PANTOPRAZOLE 40mg ONCE A DAY to help reduce bleeding risk. STOP taking your home ASPIRIN, as this can worsen bleeding. Follow up with GI Dr. Leal at [phone] as soon as possible to arrange for a capsule endoscopy, which can visualize the entire GI tract and provide more information that a traditional endoscopy.   Imaging of your chest also showed findings concerning for a condition known as amyloidosis, in which a type of protein builds up in various organ systems, including the bone marrow, causing disruptions such as anemia and low platelets. Follow up with your heme/onc specialist Dr. Mosley within 1 week of discharge for a bone marrow biopsy. Repeat chest imaging is recommended in 6-8 weeks.  We also recommend that Dr. Mosley obtain serological testing including SPEP/UPEP, TRACEY, RF, SSA/SSB, ESR, complements, quant IGs, and BNP. Bring this paper with your to your appointment to show this list and discuss testing options.   This condition can also affect your heart, causing damage and loss of function. Follow up with your primary care doctor within 1 week of discharge to arrange for an outpatient Echocardiogram, which will provide information about your heart function and help to further shine light on your diagnosis.  Seek prompt medical attention if you begin to experience worsening weakness, lightheadedness, or fatigue, or if you notice large volumes of blood in stool, urine, or vomitus.         SECONDARY DISCHARGE DIAGNOSES  Diagnosis: HTN (hypertension)  Assessment and Plan of Treatment: Continue your home carvedilol as previously, with routine follow up to monitor your blood pressure and make any changes as needed.    Diagnosis: HLD (hyperlipidemia)  Assessment and Plan of Treatment: Continue your home Lipitor as previously, with routine follow up to monitor your blood cholesterol and make any changes as needed. You may also continue to take your daily vitamin D supplements.    Diagnosis: Imaging finding  Assessment and Plan of Treatment: Imaging of your chest and abdomen showed opacities in your lungs and swelling of your lymph nodes that may represent amyloidosis, as discussed above. Follow up with pulmonologist Dr. Villaseñor within 1-2 weeks of discharge to undergo further evaluation for this condition.   Imaging was also notable for thickening in the upper portion of your right kidney, for which we recommend a follow up appointment with your primary care doctor or urologist to obtain more detailed imaging and recommendations.   A nodule was noted on the right side of your thyroid, and another on your right front chest wall, for which we recommend follow up with your primary care doctor for further evaluation and imaging.

## 2021-03-22 NOTE — DISCHARGE NOTE PROVIDER - NSDCMRMEDTOKEN_GEN_ALL_CORE_FT
carvedilol 25 mg oral tablet: 1 tab(s) orally 2 times a day  Lipitor 80 mg oral tablet: 1 tab(s) orally once a day  pantoprazole 40 mg oral delayed release tablet: 1 tab(s) orally once a day (before a meal)  Vitamin D3 1000 intl units (25 mcg) oral tablet: 1 tab(s) orally once a day

## 2021-03-22 NOTE — PROGRESS NOTE ADULT - ASSESSMENT
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 Mosley’s. S/p 2 PRBC and 1platelet transfusion on 3/20.     Imp:  Severe symptomatic anemia with Thrombocytopenia ? etiology, Pt had EGD and Colonoscopy as outpatient , No capsular Endoscopy though. Appreciate GI evaluation., monitor Cbc , Continue with protonix, hemodynamically stable with no evidence of active bleeding .   Thrombocytopenia partly due to covid infection, Appreciate hematology consult . Discussed with Dr. Mosley, pt may likely need bone marrow putpatient  Asymptomatic  COVID infection- PCR is positive  No specific interlobular septal thickening will peripheral ill defined opacities and left hilar adenopathy- Appreciate ID , discussed with Dr. Mai no  need of antibiotics. Pt to follow  Pulmonary Dr. Villaseñor outpatient  Mixed anemia with a component of Iron deficiency anemia start IV iron  HTN   DC planning in am pending clinical stability  
75 year old female with anemia and COVID 
· Assessment	  75 year old lady with anemia 5/20 had transfusion.  EGD and colonoscopy were neg.  She was anemic again in 2/21 and had transfusin again. Iron was low.  she was also thrombocytopenic.  she was admitted for Hb 6.3.  No fever or chills, or pain.  but she has lost weight.    1. anemia, with low iron, elevated retic.  LDH normal  she probably has bleeding  need capsule  will give venofer    2. thrombocytopenia, she had transfusion  platelet stays, probably not ITP  no splenomegaly  may be BM issue    3. COVID, so far asymptomatic.   she can be discharged and f/u as outpt
75 yr F with PMH of HTN, HLD, right sided Carotid artery 70% stenosis, anemia induced HF in 2020, chronic anemia was sent to ED for low Hb. Pt went to see Dr. Mosley yesterday for her chronic anemia and her Hb was 6.3 and she was sent to ED to get blood transfusion.       In ED, /50, HR 68    Pt admitted for Anemia and thrombocytopenia

## 2021-03-22 NOTE — DISCHARGE NOTE PROVIDER - PROVIDER TOKENS
PROVIDER:[TOKEN:[[id number]],FOLLOWUP:[1 week],ESTABLISHEDPATIENT:[T]],PROVIDER:[TOKEN:[[id number]],FOLLOWUP:[1 week],ESTABLISHEDPATIENT:[T]],PROVIDER:[TOKEN:[[id number]],FOLLOWUP:[1 week],ESTABLISHEDPATIENT:[T]],PROVIDER:[TOKEN:[[id number]],FOLLOWUP:[1 week],ESTABLISHEDPATIENT:[T]]

## 2021-03-22 NOTE — DISCHARGE NOTE NURSING/CASE MANAGEMENT/SOCIAL WORK - PATIENT PORTAL LINK FT
You can access the FollowMyHealth Patient Portal offered by [facility] by registering at the following website: [email or web address]. By joining [employer]’s FollowMyHealth portal, you will also be able to view your health information using other applications (apps) compatible with our system.

## 2021-03-22 NOTE — DISCHARGE NOTE PROVIDER - CARE PROVIDER_API CALL
Manuel Mcmahon)  Gastroenterology; Internal Medicine  [address] [place] [address]  Phone: [phone]  Fax: [phone]  Established Patient  Follow Up Time: 1 week    Analy Villaseñor)  Critical Care Medicine; Pulmonary Disease  [place] [address]  Phone: [phone]  Fax: [phone]  Established Patient  Follow Up Time: 1 week    Amarilys Mosley  INTERNAL MEDICINE  [address] [place] [address]  Phone: [phone]  Fax: [phone]  Established Patient  Follow Up Time: 1 week    Vargas Holbrook  Internal Medicine  [address] [place] [address]  Phone: [phone]  Fax: [phone]  Established Patient  Follow Up Time: 1 week

## 2021-03-22 NOTE — PROGRESS NOTE ADULT - PROBLEM SELECTOR PLAN 1
CBC is stable   Plan for capsule study as an outpatient:  call my office [phone] to setup study   Periodic CBC monitoring
p/w Hb 5.6  FOBT negative  Hemodynamically stable  Not actively bleeding  EGD/Colonoscopy in May 2020 at [facility] was normal  Will hold Aspirin   c/w protonix  s/p 2 units PRBC  Anemia panel showing a mixed picture (SHAAN+ACD)  Post transfusion Hb: 9  CT Chest/Abdomen/pelvis done to r/o malignancy  Monitor CBC q12  Heme/Onc Dr. Mosley  GI

## 2021-03-22 NOTE — DISCHARGE NOTE PROVIDER - ATTENDING COMMENTS
Patient seen and examined. Plan of care discussed with medical team.    OE   Elderly female NAD  RRR s1s2  Clear lungs   Soft, NT, ND, BS  No pedal edema    Pt advised follow up with GI for capsular endoscopy  Advised follow up with Pulmonology  for repeat CT imaging in 6 weeks  I spoke with patients PMD Dr. Holbrook discussed the DC plan and the necessity for outpatient ECHO, Serologic work up due to concern of amyloidosis  who agrees to arrange and follow up  Pt to follow up with Dr. Mosley for outpatient Bone Marrow biopsy.  I discussed the findings with the patient who is understanding the above follow ups

## 2021-03-22 NOTE — PROGRESS NOTE ADULT - PROBLEM SELECTOR PLAN 2
p/w Platelet count 24  Pt has H/O Thrombocytopenia for last 1 year  Not actively bleeding  s/p 1 unit platelets  Monitor platelet count   Heme/Onc Dr. Mosley
As per primary team

## 2021-03-22 NOTE — DISCHARGE NOTE PROVIDER - CARE PROVIDERS DIRECT ADDRESSES
,[email or web address],DirectAddress_Unknown,DirectAddress_Unknown

## 2021-03-22 NOTE — CONSULT NOTE ADULT - ASSESSMENT
75 year old female with recurrent anemia  Seen in ED 
75 year old lady with anemia 5/20 had transfusion.  EGD and colonoscopy were neg.  She was anemic again in 2/21 and had transfusin again. Iron was low.  she was also thrombocytopenic.  she was admitted for Hb 6.3.  No fever or chills, or pain.  but she has lost weight.    1. anemia, with low iron, elevated retic.  LDH normal  she probably has bleeding  need capsule  will give venofer    2. thrombocytopenia, she had transfusion  platelet stays, probably not ITP  no splenomegaly  may be BM issue    3. COVID, so far asymptomatic. 
75F with anemia and thrombocytopenia. Pulmonary consulted to reviewed abnormal chest imaging. I personally reviewed the CT chest. There is diffuse interlobular septal thickening with areas of nonspecific patchy nodular and GGO opacities.   Imaging can be seen with atypical COVID pneumonitis. Others to consider would be amyloidosis, geovanna given cytopenias. Less likely lymphagitic carcinomatosis, CHF, PAP, PVOD.    Would recommend:  - SPEP/UPEP if not yet done  - rheum panel including TRACEY, RF, SSA/SSB, ESR, complements  - Quant IGs  - NT proBNP  - likely will need BM, defer to Heme  - echo  - follow up chest imaging in 6-8 wks    Thank you for this consult, will continue to follow.  Pt can f/u with me as outpt.    
COVID Pneumonia - old , with chronic chest CT findings    Plan - No antibiotics or treatment for her COVID required at this time.  Reconsult prn.

## 2021-03-22 NOTE — CONSULT NOTE ADULT - SUBJECTIVE AND OBJECTIVE BOX
Patient is a 75y old  Female who presents with a chief complaint of anemia    HPI:  75 yr F with PMH of HTN, HLD, right sided Carotid artery 70% stenosis, anemia induced HF in 2020, chronic anemia was sent to ED for low Hb. Pt went to see Dr. Mosley yesterday for her chronic anemia and her Hb was 6.3 and she was sent to ED to get blood transfusion. Pt has chronic anemia since May 2020 and was hospitalised at that time for anemia causing heart failure. Pt got EGD/Colonoscopy in May 2020 at [facility] and it was normal. Pt was admitted recently to [facility] in Feb and she got 2 units blood and was discharged with Hb of 9. Pt complains of generalized weakness, fatigue and palpitations. Pt denies any abdominal pain, bleeding, use of NSAIDS, chest pain, SOB, dizziness or any other acute complaints.  she also has thrombocytopenia.  The platelet on 3/2 was 50.         ROS:  Negative except as noted in HPI    PAST MEDICAL & SURGICAL HISTORY:  Anemia    Hypertension        SOCIAL HISTORY:  nonsmoker    FAMILY HISTORY:  no h/o lung disease in family    MEDICATIONS  (STANDING):  atorvastatin 80 milliGRAM(s) Oral at bedtime  carvedilol 12.5 milliGRAM(s) Oral every 12 hours  cholecalciferol 1000 Unit(s) Oral daily  pantoprazole  Injectable 40 milliGRAM(s) IV Push daily    MEDICATIONS  (PRN):  acetaminophen   Tablet .. 650 milliGRAM(s) Oral every 6 hours PRN Moderate Pain (4 - 6)  metoprolol tartrate Injectable 5 milliGRAM(s) IV Push every 6 hours PRN for HR>100      Allergies    penicillin (Hives)    Intolerances        ICU Vital Signs Last 24 Hrs  T(C): 36.7 (22 Mar 2021 13:31), Max: 37.4 (21 Mar 2021 20:06)  T(F): 98.1 (22 Mar 2021 13:31), Max: 99.3 (21 Mar 2021 20:06)  HR: 73 (22 Mar 2021 13:31) (69 - 81)  BP: 128/53 (22 Mar 2021 13:31) (128/53 - 166/61)  BP(mean): --  ABP: --  ABP(mean): --  RR: 16 (22 Mar 2021 13:31) (16 - 17)  SpO2: 98% (22 Mar 2021 13:31) (96% - 99%)      PHYSICAL EXAM  General: adult in NAD  HEENT: clear oropharynx, anicteric sclera, pink conjunctiva  Neck: supple  CV: normal S1/S2 with no murmur rubs or gallops  Lungs: positive air movement b/l ant lungs,clear to auscultation, no wheezes, no rales  Abdomen: soft non-tender non-distended, no hepatosplenomegaly  Ext: no clubbing cyanosis or edema  Skin: no rashes and no petechiae  Neuro: alert and oriented X 4, no focal deficits      LABS:                                     8.5    5.19  )-----------( 38       ( 22 Mar 2021 07:32 )             27.5   03-22    137  |  103  |  19<H>  ----------------------------<  91  3.6   |  27  |  0.92    Ca    8.7      22 Mar 2021 07:32  Phos  3.6     03-22  Mg     2.2     03-22    TPro  6.9  /  Alb  2.9<L>  /  TBili  0.6  /  DBili  x   /  AST  19  /  ALT  19  /  AlkPhos  91  03-22        RADIOLOGY & ADDITIONAL STUDIES:    < from: CT Abdomen and Pelvis w/ IV Cont (03.19.21 @ 18:45) >  FINDINGS:                   LUNGS AND LARGE AIRWAYS: Patent central airways. Diffuse interlobular septal thickening with numerous scattered small ill-defined peripheral opacities as well as impacted distal airways in the left upper lobe.Few tiny nodular opacities noted, at least one of which is cavitary, measuring 6 mm in the superior segment of the right lower lobe (2, 61). Left upper lobe calcifications, likely from prior infection. Linear opacity in the left upper lobe, possibly scarring.  PLEURA: No pleural effusion.  VESSELS: Aortic, aortic valvular and coronary artery calcifications.  HEART: Heart size is normal. No pericardial effusion.  MEDIASTINUM AND LOBO: No mediastinal lymphadenopathy. 1.5 x 1.0 cm mild right hilar adenopathy.  CHEST WALL AND LOWER NECK: 1.3 cm hypodense right thyroid nodule which could be better evaluated with ultrasound. Indeterminate 2 cm superficial right lower anterior chest wall nodule (series 2, image 80).    ABDOMEN AND PELVIS:  LIVER: Within normal limits.  BILE DUCTS: Normal caliber.  GALLBLADDER: Within normal limits.  SPLEEN: Within normal limits.  PANCREAS: Within normal limits.  ADRENALS: Within normal limits.  KIDNEYS/URETERS: Atrophic left kidney. Bilateral renal cysts and subcentimeter right renal hypodense foci that are too small to characterize. Right upper pole caliectasis without rachel hydronephrosis and without ureteral dilatation.      < end of copied text >  < from: CT Abdomen and Pelvis w/ IV Cont (03.19.21 @ 18:45) >  BLADDER: Within normal limits.  REPRODUCTIVE ORGANS: Heterogeneous uterus, probablyon the basis of small fibroids. No adnexal mass.    BOWEL: Small hiatal hernia. No bowel obstruction. Appendix is normal.  PERITONEUM: No ascites.  VESSELS: Atherosclerotic changes.  RETROPERITONEUM/LYMPH NODES: No lymphadenopathy.  ABDOMINAL WALL: Within normal limits.  BONES: Degenerative changes.    IMPRESSION:  Nonspecific diffuse interlobular septal thickening with scattered small ill-defined peripheral opacities and scattered nodules, at least one of which is cavitary, question infection. Left hilar adenopathy.    < end of copied text >  < from: CT Abdomen and Pelvis w/ IV Cont (03.19.21 @ 18:45) >  Atrophic left kidney. Right upper pole caliectasis of unknown etiology. Correlate with urine cytology and as necessary, CT urogram.    Right thyroid nodule, which could be better evaluated with ultrasound.    Superficial indeterminate right midline anterior chest wall nodule, that should be further assessed upon clinical/dermatologic grounds.      < end of copied text >

## 2021-03-22 NOTE — PROGRESS NOTE ADULT - SUBJECTIVE AND OBJECTIVE BOX
doing well  no fever or chills, no sob      MEDICATIONS  (STANDING):  atorvastatin 80 milliGRAM(s) Oral at bedtime  carvedilol 12.5 milliGRAM(s) Oral every 12 hours  cholecalciferol 1000 Unit(s) Oral daily  iron sucrose IVPB 100 milliGRAM(s) IV Intermittent every 24 hours  pantoprazole  Injectable 40 milliGRAM(s) IV Push daily    MEDICATIONS  (PRN):  acetaminophen   Tablet .. 650 milliGRAM(s) Oral every 6 hours PRN Moderate Pain (4 - 6)      Allergies    penicillin (Hives)    Intolerances        Vital Signs Last 24 Hrs  T(C): 36.6 (22 Mar 2021 05:56), Max: 37.4 (21 Mar 2021 20:06)  T(F): 97.8 (22 Mar 2021 05:56), Max: 99.3 (21 Mar 2021 20:06)  HR: 69 (22 Mar 2021 05:56) (69 - 81)  BP: 166/61 (22 Mar 2021 05:56) (150/47 - 179/69)  BP(mean): --  RR: 16 (22 Mar 2021 05:56) (16 - 17)  SpO2: 96% (22 Mar 2021 05:56) (96% - 99%)    PHYSICAL EXAM  General: adult in NAD  HEENT: clear oropharynx, anicteric sclera, pink conjunctiva  Neck: supple  CV: normal S1/S2 with no murmur rubs or gallops  Lungs: positive air movement b/l ant lungs,clear to auscultation, no wheezes, no rales  Abdomen: soft non-tender non-distended, no hepatosplenomegaly  Ext: no clubbing cyanosis or edema  Skin: no rashes and no petechiae  Neuro: alert and oriented X 4, no focal deficits  LABS:                          8.5    5.19  )-----------( 38       ( 22 Mar 2021 07:32 )             27.5         Mean Cell Volume : 89.9 fl  Mean Cell Hemoglobin : 27.8 pg  Mean Cell Hemoglobin Concentration : 30.9 gm/dL  Auto Neutrophil # : 2.81 K/uL  Auto Lymphocyte # : 1.60 K/uL  Auto Monocyte # : 0.57 K/uL  Auto Eosinophil # : 0.17 K/uL  Auto Basophil # : 0.01 K/uL  Auto Neutrophil % : 54.1 %  Auto Lymphocyte % : 30.8 %  Auto Monocyte % : 11.0 %  Auto Eosinophil % : 3.3 %  Auto Basophil % : 0.2 %    Serial CBC  Hematocrit 27.5  Hemoglobin 8.5  Plat 38  RBC 3.06  WBC 5.19  Serial CBC  Hematocrit 26.5  Hemoglobin 8.3  Plat 47  RBC 2.98  WBC 5.82  Serial CBC  Hematocrit 28.2  Hemoglobin 8.6  Plat 53  RBC 3.16  WBC 6.22  Serial CBC  Hematocrit 26.9  Hemoglobin 8.4  Plat 54  RBC 2.98  WBC 6.06  Serial CBC  Hematocrit 28.9  Hemoglobin 9.0  Plat 65  RBC 3.27  WBC 6.93  Serial CBC  Hematocrit 28.8  Hemoglobin 8.9  Plat 58  RBC 3.24  WBC 6.01  Serial CBC  Hematocrit --  Hemoglobin --  Plat --  RBC 2.10  WBC --  Serial CBC  Hematocrit 20.0  Hemoglobin 5.6  Plat 24  RBC 2.09  WBC 5.99    03-22    137  |  103  |  19<H>  ----------------------------<  91  3.6   |  27  |  0.92    Ca    8.7      22 Mar 2021 07:32  Phos  3.6     03-22  Mg     2.2     03-22    TPro  6.9  /  Alb  2.9<L>  /  TBili  0.6  /  DBili  x   /  AST  19  /  ALT  19  /  AlkPhos  91  03-22          Ferritin, Serum: 33 ng/mL (03-19 @ 19:46)  Folate, Serum: 16.2 ng/mL (03-19 @ 19:46)  Vitamin B12, Serum: 380 pg/mL (03-19 @ 19:46)  Iron - Total Binding Capacity.: 326 ug/dL (03-19 @ 15:02)  Reticulocyte Percent: 4.4 % (03-19 @ 14:55)            BLOOD SMEAR INTERPRETATION:       RADIOLOGY & ADDITIONAL STUDIES:    
Summary:   75y  Female      Subjective:   Events noted     Objective:    MEDICATIONS  (STANDING):  atorvastatin 80 milliGRAM(s) Oral at bedtime  carvedilol 12.5 milliGRAM(s) Oral every 12 hours  cholecalciferol 1000 Unit(s) Oral daily  coronavirus (EUA) Vaccine ([employer]) 0.5 milliLiter(s) IntraMuscular once  iron sucrose IVPB 100 milliGRAM(s) IV Intermittent every 24 hours  pantoprazole    Tablet 40 milliGRAM(s) Oral before breakfast    MEDICATIONS  (PRN):  acetaminophen   Tablet .. 650 milliGRAM(s) Oral every 6 hours PRN Moderate Pain (4 - 6)              Vital Signs Last 24 Hrs  T(C): 36.7 (22 Mar 2021 13:31), Max: 37.4 (21 Mar 2021 20:06)  T(F): 98.1 (22 Mar 2021 13:31), Max: 99.3 (21 Mar 2021 20:06)  HR: 73 (22 Mar 2021 13:31) (69 - 81)  BP: 128/53 (22 Mar 2021 13:31) (128/53 - 166/61)  BP(mean): --  RR: 16 (22 Mar 2021 13:31) (16 - 17)  SpO2: 98% (22 Mar 2021 13:31) (96% - 99%)      General:  Well developed, well nourished, alert and active, no pallor, NAD.  HEENT:    Normal appearance of conjunctiva, ears, nose, lips, oropharynx, and oral mucosa, anicteric.  Neck:  No masses, no asymmetry.  Lymph Nodes:  No lymphadenopathy.   Cardiovascular:  RRR normal S1/S2, no murmur.  Respiratory:  CTA B/L, normal respiratory effort.   Abdominal:   soft, no masses or tenderness, normoactive BS, NT/ND, no HSM.  Extremities:   No clubbing or cyanosis, normal capillary refill, no edema.   Skin:   No rash, jaundice, lesions, eczema.   Musculoskeletal:  No joint swelling, erythema or tenderness.   Neuro: No focal deficits.   Other:       LABS:                        8.5    5.19  )-----------( 38       ( 22 Mar 2021 07:32 )             27.5     03-22    137  |  103  |  19<H>  ----------------------------<  91  3.6   |  27  |  0.92    Ca    8.7      22 Mar 2021 07:32  Phos  3.6     03-22  Mg     2.2     03-22    TPro  6.9  /  Alb  2.9<L>  /  TBili  0.6  /  DBili  x   /  AST  19  /  ALT  19  /  AlkPhos  91  03-22          RADIOLOGY & ADDITIONAL TESTS:    
Doing well, no dyspnea or chest pain    ROS:  Res:  no cough, SOB  Heart: no chest pain, dyspnea, palpitation  GI: no nausea, vomiting, abdominal pain  Musc: no bone/joint pain  Urinary: no dysuria, ferquency      Vital Signs Last 24 Hrs  T(C): 36.7 (21 Mar 2021 05:45), Max: 36.8 (20 Mar 2021 20:55)  T(F): 98.1 (21 Mar 2021 05:45), Max: 98.3 (20 Mar 2021 20:55)  HR: 73 (21 Mar 2021 05:45) (72 - 80)  BP: 145/55 (21 Mar 2021 05:45) (118/56 - 178/56)  BP(mean): --  RR: 17 (21 Mar 2021 05:45) (16 - 17)  SpO2: 99% (21 Mar 2021 05:45) (98% - 99%)      OE    NAD, pleasant female  RRR s1s2  Clear lungs, no rales or rhonchi   Soft, NT, ND  No pedal edema                          8.3    5.82  )-----------( 47       ( 21 Mar 2021 08:23 )             26.5     03-21    137  |  101  |  20<H>  ----------------------------<  102<H>  3.6   |  27  |  0.97    Ca    8.6      21 Mar 2021 08:23  Phos  3.8     03-21  Mg     2.3     03-21    TPro  6.5  /  Alb  2.7<L>  /  TBili  0.6  /  DBili  x   /  AST  17  /  ALT  19  /  AlkPhos  87  03-21  
PGY-1 Progress Note discussed with attending    PAGER #: [[phone]] TILL 5:00 PM  PLEASE CONTACT ON CALL TEAM:  - On Call Team (Please refer to Katharine) FROM 5:00 PM - 8:30PM  - Nightfloat Team FROM 8:30 -7:30 AM    CHIEF COMPLAINT & BRIEF HOSPITAL COURSE:  75 yr F with PMH of HTN, HLD, right sided Carotid artery 70% stenosis, anemia induced HF in 2020, chronic anemia was sent to ED for low Hb. Pt went to see Dr. Mosley yesterday for her chronic anemia and her Hb was 6.3 and she was sent to ED to get blood transfusion. Pt has chronic anemia since May 2020 and was hospitalised at that time for anemia causing heart failure. Pt got EGD/Colonoscopy in May 2020 at [facility] and it was normal. Pt was admitted recently to [facility] in Feb and she got 2 units blood and was discharged with Hb of 9. Pt complains of generalized weakness, fatigue and palpitations. Pt denies any abdominal pain, bleeding, use of NSAIDS, chest pain, SOB, dizziness or any other acute complaints.    In ED, /50, HR 68      INTERVAL HPI/OVERNIGHT EVENTS:   No acute overnight events. S/p 2uPRBCs and 1 UPLT. No signs of bleeding.     MEDICATIONS  (STANDING):  atorvastatin 80 milliGRAM(s) Oral at bedtime  cholecalciferol 1000 Unit(s) Oral daily  pantoprazole  Injectable 40 milliGRAM(s) IV Push daily    MEDICATIONS  (PRN):  acetaminophen   Tablet .. 650 milliGRAM(s) Oral every 6 hours PRN Moderate Pain (4 - 6)  metoprolol tartrate Injectable 5 milliGRAM(s) IV Push every 6 hours PRN for HR>100      REVIEW OF SYSTEMS:  CONSTITUTIONAL: No fever, weight loss, or fatigue  RESPIRATORY: No cough, wheezing, chills or hemoptysis; No shortness of breath  CARDIOVASCULAR: No chest pain, palpitations, dizziness, or leg swelling  GASTROINTESTINAL: No abdominal pain. No nausea, vomiting, or hematemesis; No diarrhea or constipation. No melena or hematochezia.  GENITOURINARY: No dysuria or hematuria, urinary frequency  NEUROLOGICAL: No headaches, memory loss, loss of strength, numbness, or tremors  SKIN: No itching, burning, rashes, or lesions     Vital Signs Last 24 Hrs  T(C): 37 (20 Mar 2021 04:48), Max: 37.1 (19 Mar 2021 17:40)  T(F): 98.6 (20 Mar 2021 04:48), Max: 98.8 (19 Mar 2021 17:40)  HR: 76 (20 Mar 2021 04:48) (72 - 76)  BP: 169/63 (20 Mar 2021 04:48) (105/42 - 207/80)  BP(mean): --  RR: 18 (20 Mar 2021 04:48) (17 - 18)  SpO2: 98% (20 Mar 2021 04:48) (97% - 98%)    PHYSICAL EXAMINATION:  GENERAL: NAD, well built  HEAD:  Atraumatic, Normocephalic  EYES:  conjunctiva and sclera clear  NECK: Supple, No JVD, Normal thyroid  CHEST/LUNG: Clear to auscultation. Clear to percussion bilaterally; No rales, rhonchi, wheezing, or rubs  HEART: Regular rate and rhythm; No murmurs, rubs, or gallops  ABDOMEN: Soft, Nontender, Nondistended; Bowel sounds present  NERVOUS SYSTEM:  Alert & Oriented X3,    EXTREMITIES:  2+ Peripheral Pulses, No clubbing, cyanosis, or edema  SKIN: warm dry                          9.0    6.93  )-----------( 65       ( 20 Mar 2021 07:21 )             28.9     03-20    137  |  104  |  18  ----------------------------<  83  3.8   |  25  |  0.98    Ca    8.8      20 Mar 2021 07:21  Phos  3.9     03-20  Mg     2.4     03-20    TPro  6.7  /  Alb  2.8<L>  /  TBili  0.7  /  DBili  x   /  AST  18  /  ALT  19  /  AlkPhos  91  03-20    LIVER FUNCTIONS - ( 20 Mar 2021 07:21 )  Alb: 2.8 g/dL / Pro: 6.7 g/dL / ALK PHOS: 91 U/L / ALT: 19 U/L DA / AST: 18 U/L / GGT: x               PT/INR - ( 19 Mar 2021 09:01 )   PT: 11.4 sec;   INR: 0.96 ratio         PTT - ( 19 Mar 2021 09:01 )  PTT:31.6 sec    CAPILLARY BLOOD GLUCOSE              RADIOLOGY & ADDITIONAL TESTS:

## 2021-04-26 ENCOUNTER — EMERGENCY (EMERGENCY)
Facility: HOSPITAL | Age: 76
LOS: 1 days | Discharge: HOME | End: 2021-04-26
Attending: EMERGENCY MEDICINE
Payer: MEDICARE

## 2021-04-26 VITALS
DIASTOLIC BLOOD PRESSURE: 46 MMHG | WEIGHT: 131.62 LBS | HEART RATE: 66 BPM | RESPIRATION RATE: 18 BRPM | OXYGEN SATURATION: 99 % | HEIGHT: 65 IN | SYSTOLIC BLOOD PRESSURE: 125 MMHG | TEMPERATURE: 98 F

## 2021-04-26 VITALS
TEMPERATURE: 98 F | DIASTOLIC BLOOD PRESSURE: 69 MMHG | SYSTOLIC BLOOD PRESSURE: 166 MMHG | OXYGEN SATURATION: 99 % | HEART RATE: 72 BPM | RESPIRATION RATE: 20 BRPM

## 2021-04-26 PROBLEM — I10 ESSENTIAL (PRIMARY) HYPERTENSION: Chronic | Status: ACTIVE | Noted: 2021-03-19

## 2021-04-26 PROBLEM — D64.9 ANEMIA, UNSPECIFIED: Chronic | Status: ACTIVE | Noted: 2021-03-19

## 2021-04-26 LAB
ALBUMIN SERPL ELPH-MCNC: 3 G/DL — LOW (ref 3.5–5)
ALP SERPL-CCNC: 72 U/L — SIGNIFICANT CHANGE UP (ref 40–120)
ALT FLD-CCNC: 20 U/L DA — SIGNIFICANT CHANGE UP (ref 10–60)
ANION GAP SERPL CALC-SCNC: 6 MMOL/L — SIGNIFICANT CHANGE UP (ref 5–17)
APTT BLD: 31.1 SEC — SIGNIFICANT CHANGE UP (ref 27.5–35.5)
AST SERPL-CCNC: 15 U/L — SIGNIFICANT CHANGE UP (ref 10–40)
BASOPHILS # BLD AUTO: 0.01 K/UL — SIGNIFICANT CHANGE UP (ref 0–0.2)
BASOPHILS NFR BLD AUTO: 0.2 % — SIGNIFICANT CHANGE UP (ref 0–2)
BILIRUB SERPL-MCNC: 0.3 MG/DL — SIGNIFICANT CHANGE UP (ref 0.2–1.2)
BUN SERPL-MCNC: 27 MG/DL — HIGH (ref 7–18)
CALCIUM SERPL-MCNC: 8.5 MG/DL — SIGNIFICANT CHANGE UP (ref 8.4–10.5)
CHLORIDE SERPL-SCNC: 105 MMOL/L — SIGNIFICANT CHANGE UP (ref 96–108)
CO2 SERPL-SCNC: 27 MMOL/L — SIGNIFICANT CHANGE UP (ref 22–31)
CREAT SERPL-MCNC: 0.96 MG/DL — SIGNIFICANT CHANGE UP (ref 0.5–1.3)
EOSINOPHIL # BLD AUTO: 0.04 K/UL — SIGNIFICANT CHANGE UP (ref 0–0.5)
EOSINOPHIL NFR BLD AUTO: 0.8 % — SIGNIFICANT CHANGE UP (ref 0–6)
GLUCOSE SERPL-MCNC: 103 MG/DL — HIGH (ref 70–99)
HCT VFR BLD CALC: 20.4 % — CRITICAL LOW (ref 34.5–45)
HGB BLD-MCNC: 6.1 G/DL — CRITICAL LOW (ref 11.5–15.5)
IMM GRANULOCYTES NFR BLD AUTO: 0.8 % — SIGNIFICANT CHANGE UP (ref 0–1.5)
INR BLD: 1.03 RATIO — SIGNIFICANT CHANGE UP (ref 0.88–1.16)
LYMPHOCYTES # BLD AUTO: 1.76 K/UL — SIGNIFICANT CHANGE UP (ref 1–3.3)
LYMPHOCYTES # BLD AUTO: 34.4 % — SIGNIFICANT CHANGE UP (ref 13–44)
MCHC RBC-ENTMCNC: 27 PG — SIGNIFICANT CHANGE UP (ref 27–34)
MCHC RBC-ENTMCNC: 29.9 GM/DL — LOW (ref 32–36)
MCV RBC AUTO: 90.3 FL — SIGNIFICANT CHANGE UP (ref 80–100)
MONOCYTES # BLD AUTO: 0.24 K/UL — SIGNIFICANT CHANGE UP (ref 0–0.9)
MONOCYTES NFR BLD AUTO: 4.7 % — SIGNIFICANT CHANGE UP (ref 2–14)
NEUTROPHILS # BLD AUTO: 3.03 K/UL — SIGNIFICANT CHANGE UP (ref 1.8–7.4)
NEUTROPHILS NFR BLD AUTO: 59.1 % — SIGNIFICANT CHANGE UP (ref 43–77)
NRBC # BLD: 0 /100 WBCS — SIGNIFICANT CHANGE UP (ref 0–0)
PLATELET # BLD AUTO: 19 K/UL — CRITICAL LOW (ref 150–400)
POTASSIUM SERPL-MCNC: 4.1 MMOL/L — SIGNIFICANT CHANGE UP (ref 3.5–5.3)
POTASSIUM SERPL-SCNC: 4.1 MMOL/L — SIGNIFICANT CHANGE UP (ref 3.5–5.3)
PROT SERPL-MCNC: 6.3 G/DL — SIGNIFICANT CHANGE UP (ref 6–8.3)
PROTHROM AB SERPL-ACNC: 12.2 SEC — SIGNIFICANT CHANGE UP (ref 10.6–13.6)
RBC # BLD: 2.26 M/UL — LOW (ref 3.8–5.2)
RBC # FLD: 15 % — HIGH (ref 10.3–14.5)
SODIUM SERPL-SCNC: 138 MMOL/L — SIGNIFICANT CHANGE UP (ref 135–145)
WBC # BLD: 5.12 K/UL — SIGNIFICANT CHANGE UP (ref 3.8–10.5)
WBC # FLD AUTO: 5.12 K/UL — SIGNIFICANT CHANGE UP (ref 3.8–10.5)

## 2021-04-26 PROCEDURE — 86900 BLOOD TYPING SEROLOGIC ABO: CPT

## 2021-04-26 PROCEDURE — 86850 RBC ANTIBODY SCREEN: CPT

## 2021-04-26 PROCEDURE — 86923 COMPATIBILITY TEST ELECTRIC: CPT

## 2021-04-26 PROCEDURE — 85025 COMPLETE CBC W/AUTO DIFF WBC: CPT

## 2021-04-26 PROCEDURE — P9040: CPT

## 2021-04-26 PROCEDURE — 85730 THROMBOPLASTIN TIME PARTIAL: CPT

## 2021-04-26 PROCEDURE — 36415 COLL VENOUS BLD VENIPUNCTURE: CPT

## 2021-04-26 PROCEDURE — 80053 COMPREHEN METABOLIC PANEL: CPT

## 2021-04-26 PROCEDURE — 99284 EMERGENCY DEPT VISIT MOD MDM: CPT

## 2021-04-26 PROCEDURE — 85610 PROTHROMBIN TIME: CPT

## 2021-04-26 PROCEDURE — 36430 TRANSFUSION BLD/BLD COMPNT: CPT

## 2021-04-26 PROCEDURE — 99283 EMERGENCY DEPT VISIT LOW MDM: CPT | Mod: 25

## 2021-04-26 PROCEDURE — 86901 BLOOD TYPING SEROLOGIC RH(D): CPT

## 2021-04-26 NOTE — ED ADULT NURSE NOTE - NSIMPLEMENTINTERV_GEN_ALL_ED
Implemented All Universal Safety Interventions:  [place] to call system. Call bell, personal items and telephone within reach. Instruct patient to call for assistance. Room bathroom lighting operational. Non-slip footwear when patient is off stretcher. Physically safe environment: no spills, clutter or unnecessary equipment. Stretcher in lowest position, wheels locked, appropriate side rails in place.

## 2021-04-26 NOTE — ED PROVIDER NOTE - OBJECTIVE STATEMENT
76 y/o F pt with a PMH of anemia, HTN, Myelodysplastic syndrome with 5 cycles of treatment, sent by Dr. Mosley for blood transfusion. Patient denies any complaints including rectal bleeding, fever or vomiting. Notes she has some bruising on her arms.

## 2021-04-26 NOTE — ED PROVIDER NOTE - PATIENT PORTAL LINK FT
You can access the FollowMyHealth Patient Portal offered by [facility] by registering at the following website: [email or web address]. By joining [employer]’s FollowMyHealth portal, you will also be able to view your health information using other applications (apps) compatible with our system.

## 2021-04-26 NOTE — ED PROVIDER NOTE - PROGRESS NOTE DETAILS
resting comfortably, awaiting 2nd unit. I spoke with dr Mosley, updated him on the labs, plt of 18K, He then asked for 2 units of blood and not platelets

## 2021-04-26 NOTE — ED ADULT NURSE NOTE - CAS DISCH CONDITION
COVID-19 Pre-surgery screenin. Do you have an undiagnosed respiratory illness or symptoms such as coughing or sneezing? No  a. Onset of Sx No  b. Acute vs. chronic respiratory illness No    2. Do you have an unexplained fever greater than 100.4 degrees Fahrenheit or 38 degrees Celsius?     No    3. Have you had direct exposure to a patient who tested positive for Covid-19?    No    4. Have you had any loss of your sense of taste or smell? Have you had N/V or sore throat? No    Patient has been informed of visitor policy and asked to wear a mask upon entering the hospital   Yes      
Pt AA/Ox4. VSS. Pt denies pain or need for pain medications at this time. Pt denies numbness or tingling. Pt moves all extremities. Pt denies nausea or vomiting. Pt had sips of water.    Pt's daughter, Kim, updated.     Report given to GIANNI Ashford at [address].    Pt via bed, accompanied by transport, was transferred to [facility] at 1956.    All personal belongings sent with Pt.    Oxygen tank was over half full when Pt left PACU.  
Stable

## 2021-05-03 ENCOUNTER — EMERGENCY (EMERGENCY)
Facility: HOSPITAL | Age: 76
LOS: 1 days | Discharge: HOME | End: 2021-05-03
Attending: EMERGENCY MEDICINE
Payer: MEDICARE

## 2021-05-03 VITALS
DIASTOLIC BLOOD PRESSURE: 72 MMHG | SYSTOLIC BLOOD PRESSURE: 137 MMHG | RESPIRATION RATE: 20 BRPM | HEART RATE: 66 BPM | WEIGHT: 128.09 LBS | HEIGHT: 65 IN | TEMPERATURE: 98 F | OXYGEN SATURATION: 99 %

## 2021-05-03 VITALS
SYSTOLIC BLOOD PRESSURE: 173 MMHG | DIASTOLIC BLOOD PRESSURE: 70 MMHG | OXYGEN SATURATION: 98 % | TEMPERATURE: 98 F | RESPIRATION RATE: 18 BRPM | HEART RATE: 72 BPM

## 2021-05-03 PROBLEM — D46.9 MYELODYSPLASTIC SYNDROME, UNSPECIFIED: Chronic | Status: ACTIVE | Noted: 2021-04-26

## 2021-05-03 LAB
ALBUMIN SERPL ELPH-MCNC: 3 G/DL — LOW (ref 3.5–5)
ALP SERPL-CCNC: 69 U/L — SIGNIFICANT CHANGE UP (ref 40–120)
ALT FLD-CCNC: 19 U/L DA — SIGNIFICANT CHANGE UP (ref 10–60)
ANION GAP SERPL CALC-SCNC: 5 MMOL/L — SIGNIFICANT CHANGE UP (ref 5–17)
ANISOCYTOSIS BLD QL: SLIGHT — SIGNIFICANT CHANGE UP
APTT BLD: 28.3 SEC — SIGNIFICANT CHANGE UP (ref 27.5–35.5)
AST SERPL-CCNC: 20 U/L — SIGNIFICANT CHANGE UP (ref 10–40)
BASOPHILS # BLD AUTO: 0.04 K/UL — SIGNIFICANT CHANGE UP (ref 0–0.2)
BASOPHILS NFR BLD AUTO: 1 % — SIGNIFICANT CHANGE UP (ref 0–2)
BILIRUB SERPL-MCNC: 0.5 MG/DL — SIGNIFICANT CHANGE UP (ref 0.2–1.2)
BLD GP AB SCN SERPL QL: SIGNIFICANT CHANGE UP
BUN SERPL-MCNC: 24 MG/DL — HIGH (ref 7–18)
CALCIUM SERPL-MCNC: 8.2 MG/DL — LOW (ref 8.4–10.5)
CHLORIDE SERPL-SCNC: 104 MMOL/L — SIGNIFICANT CHANGE UP (ref 96–108)
CO2 SERPL-SCNC: 28 MMOL/L — SIGNIFICANT CHANGE UP (ref 22–31)
CREAT SERPL-MCNC: 0.88 MG/DL — SIGNIFICANT CHANGE UP (ref 0.5–1.3)
DACRYOCYTES BLD QL SMEAR: SLIGHT — SIGNIFICANT CHANGE UP
EOSINOPHIL # BLD AUTO: 0 K/UL — SIGNIFICANT CHANGE UP (ref 0–0.5)
EOSINOPHIL NFR BLD AUTO: 0 % — SIGNIFICANT CHANGE UP (ref 0–6)
GLUCOSE SERPL-MCNC: 119 MG/DL — HIGH (ref 70–99)
HCT VFR BLD CALC: 18.8 % — CRITICAL LOW (ref 34.5–45)
HGB BLD-MCNC: 5.7 G/DL — CRITICAL LOW (ref 11.5–15.5)
HYPOCHROMIA BLD QL: SLIGHT — SIGNIFICANT CHANGE UP
INR BLD: 1 RATIO — SIGNIFICANT CHANGE UP (ref 0.88–1.16)
LYMPHOCYTES # BLD AUTO: 1.8 K/UL — SIGNIFICANT CHANGE UP (ref 1–3.3)
LYMPHOCYTES # BLD AUTO: 47 % — HIGH (ref 13–44)
MANUAL SMEAR VERIFICATION: SIGNIFICANT CHANGE UP
MCHC RBC-ENTMCNC: 28.1 PG — SIGNIFICANT CHANGE UP (ref 27–34)
MCHC RBC-ENTMCNC: 30.3 GM/DL — LOW (ref 32–36)
MCV RBC AUTO: 92.6 FL — SIGNIFICANT CHANGE UP (ref 80–100)
MONOCYTES # BLD AUTO: 0 K/UL — SIGNIFICANT CHANGE UP (ref 0–0.9)
MONOCYTES NFR BLD AUTO: 0 % — LOW (ref 2–14)
NEUTROPHILS # BLD AUTO: 1.99 K/UL — SIGNIFICANT CHANGE UP (ref 1.8–7.4)
NEUTROPHILS NFR BLD AUTO: 52 % — SIGNIFICANT CHANGE UP (ref 43–77)
NRBC # BLD: 0 /100 — SIGNIFICANT CHANGE UP (ref 0–0)
PLAT MORPH BLD: NORMAL — SIGNIFICANT CHANGE UP
PLATELET # BLD AUTO: 13 K/UL — CRITICAL LOW (ref 150–400)
PLATELET COUNT - ESTIMATE: ABNORMAL
POIKILOCYTOSIS BLD QL AUTO: SLIGHT — SIGNIFICANT CHANGE UP
POLYCHROMASIA BLD QL SMEAR: SLIGHT — SIGNIFICANT CHANGE UP
POTASSIUM SERPL-MCNC: 3.8 MMOL/L — SIGNIFICANT CHANGE UP (ref 3.5–5.3)
POTASSIUM SERPL-SCNC: 3.8 MMOL/L — SIGNIFICANT CHANGE UP (ref 3.5–5.3)
PROT SERPL-MCNC: 6.3 G/DL — SIGNIFICANT CHANGE UP (ref 6–8.3)
PROTHROM AB SERPL-ACNC: 11.9 SEC — SIGNIFICANT CHANGE UP (ref 10.6–13.6)
RBC # BLD: 2.03 M/UL — LOW (ref 3.8–5.2)
RBC # FLD: 17.1 % — HIGH (ref 10.3–14.5)
RBC BLD AUTO: ABNORMAL
SODIUM SERPL-SCNC: 137 MMOL/L — SIGNIFICANT CHANGE UP (ref 135–145)
WBC # BLD: 3.82 K/UL — SIGNIFICANT CHANGE UP (ref 3.8–10.5)
WBC # FLD AUTO: 3.82 K/UL — SIGNIFICANT CHANGE UP (ref 3.8–10.5)

## 2021-05-03 PROCEDURE — 86900 BLOOD TYPING SEROLOGIC ABO: CPT

## 2021-05-03 PROCEDURE — 93005 ELECTROCARDIOGRAM TRACING: CPT

## 2021-05-03 PROCEDURE — 80053 COMPREHEN METABOLIC PANEL: CPT

## 2021-05-03 PROCEDURE — 71045 X-RAY EXAM CHEST 1 VIEW: CPT | Mod: 26

## 2021-05-03 PROCEDURE — P9040: CPT

## 2021-05-03 PROCEDURE — 36415 COLL VENOUS BLD VENIPUNCTURE: CPT

## 2021-05-03 PROCEDURE — 99284 EMERGENCY DEPT VISIT MOD MDM: CPT

## 2021-05-03 PROCEDURE — P9037: CPT

## 2021-05-03 PROCEDURE — 85730 THROMBOPLASTIN TIME PARTIAL: CPT

## 2021-05-03 PROCEDURE — 86901 BLOOD TYPING SEROLOGIC RH(D): CPT

## 2021-05-03 PROCEDURE — 86923 COMPATIBILITY TEST ELECTRIC: CPT

## 2021-05-03 PROCEDURE — 99284 EMERGENCY DEPT VISIT MOD MDM: CPT | Mod: 25

## 2021-05-03 PROCEDURE — 85025 COMPLETE CBC W/AUTO DIFF WBC: CPT

## 2021-05-03 PROCEDURE — 71045 X-RAY EXAM CHEST 1 VIEW: CPT

## 2021-05-03 PROCEDURE — 86850 RBC ANTIBODY SCREEN: CPT

## 2021-05-03 PROCEDURE — 85610 PROTHROMBIN TIME: CPT

## 2021-05-03 PROCEDURE — 82962 GLUCOSE BLOOD TEST: CPT

## 2021-05-03 PROCEDURE — P9100: CPT

## 2021-05-03 PROCEDURE — 36430 TRANSFUSION BLD/BLD COMPNT: CPT

## 2021-05-03 NOTE — ED PROVIDER NOTE - CLINICAL SUMMARY MEDICAL DECISION MAKING FREE TEXT BOX
74 yo female patient with chief complaint of dizziness and weakness. Patient with anemia by history and physical exam, will check labs and treat accordingly.

## 2021-05-03 NOTE — ED PROVIDER NOTE - OBJECTIVE STATEMENT
74 yo female with pmhx of myelodysplastic disorder. Patient was treated with chemo x2 weeks ago and had blood transfusion here x1 week ago. Patient felt good until x2 days ago when general weakness, dizziness, and mild shortness of breath with exertion began similar to feeling of previous anemia. Patient denies fever, chills, chest pain, nausea, and vomiting.  Patient has no black or blood in stool and no recent trauma. Patient has no other known complaints. NKDA

## 2021-05-03 NOTE — ED ADULT NURSE NOTE - OBJECTIVE STATEMENT
Pt. c/o lightheaded, dizziness, and weakness that started around Friday but got worse the last 2 days. Pt. stated that last Monday received 2 blood transfusion. Pt. recently diagnosed with leukemia.

## 2021-05-03 NOTE — ED PROVIDER NOTE - PATIENT PORTAL LINK FT
You can access the FollowMyHealth Patient Portal offered by [facility] by registering at the following website: [email or web address]. By joining [employer]’s FollowMyHealth portal, you will also be able to view your health information using other applications (apps) compatible with our system.

## 2021-05-25 ENCOUNTER — INPATIENT (INPATIENT)
Facility: HOSPITAL | Age: 76
LOS: 2 days | Discharge: HOME | DRG: 812 | End: 2021-05-28
Attending: INTERNAL MEDICINE | Admitting: INTERNAL MEDICINE
Payer: MEDICARE

## 2021-05-25 VITALS
SYSTOLIC BLOOD PRESSURE: 84 MMHG | DIASTOLIC BLOOD PRESSURE: 52 MMHG | HEIGHT: 65 IN | HEART RATE: 82 BPM | OXYGEN SATURATION: 97 % | TEMPERATURE: 98 F | WEIGHT: 145.06 LBS | RESPIRATION RATE: 18 BRPM

## 2021-05-25 DIAGNOSIS — D64.9 ANEMIA, UNSPECIFIED: ICD-10-CM

## 2021-05-25 DIAGNOSIS — Z29.9 ENCOUNTER FOR PROPHYLACTIC MEASURES, UNSPECIFIED: ICD-10-CM

## 2021-05-25 DIAGNOSIS — I10 ESSENTIAL (PRIMARY) HYPERTENSION: ICD-10-CM

## 2021-05-25 DIAGNOSIS — D46.9 MYELODYSPLASTIC SYNDROME, UNSPECIFIED: ICD-10-CM

## 2021-05-25 LAB
ALBUMIN SERPL ELPH-MCNC: 2.6 G/DL — LOW (ref 3.5–5)
ALP SERPL-CCNC: 60 U/L — SIGNIFICANT CHANGE UP (ref 40–120)
ALT FLD-CCNC: 19 U/L DA — SIGNIFICANT CHANGE UP (ref 10–60)
ANION GAP SERPL CALC-SCNC: 9 MMOL/L — SIGNIFICANT CHANGE UP (ref 5–17)
AST SERPL-CCNC: 17 U/L — SIGNIFICANT CHANGE UP (ref 10–40)
BASOPHILS # BLD AUTO: 0 K/UL — SIGNIFICANT CHANGE UP (ref 0–0.2)
BASOPHILS NFR BLD AUTO: 0 % — SIGNIFICANT CHANGE UP (ref 0–2)
BILIRUB SERPL-MCNC: 0.4 MG/DL — SIGNIFICANT CHANGE UP (ref 0.2–1.2)
BUN SERPL-MCNC: 32 MG/DL — HIGH (ref 7–18)
CALCIUM SERPL-MCNC: 8 MG/DL — LOW (ref 8.4–10.5)
CHLORIDE SERPL-SCNC: 104 MMOL/L — SIGNIFICANT CHANGE UP (ref 96–108)
CO2 SERPL-SCNC: 24 MMOL/L — SIGNIFICANT CHANGE UP (ref 22–31)
CREAT SERPL-MCNC: 0.77 MG/DL — SIGNIFICANT CHANGE UP (ref 0.5–1.3)
EOSINOPHIL # BLD AUTO: 0 K/UL — SIGNIFICANT CHANGE UP (ref 0–0.5)
EOSINOPHIL NFR BLD AUTO: 0 % — SIGNIFICANT CHANGE UP (ref 0–6)
GLUCOSE SERPL-MCNC: 180 MG/DL — HIGH (ref 70–99)
HCT VFR BLD CALC: 14.7 % — CRITICAL LOW (ref 34.5–45)
HGB BLD-MCNC: 4.3 G/DL — CRITICAL LOW (ref 11.5–15.5)
IRON SATN MFR SERPL: 188 UG/DL — HIGH (ref 40–150)
IRON SATN MFR SERPL: 77 % — HIGH (ref 15–50)
LDH SERPL L TO P-CCNC: 224 U/L — SIGNIFICANT CHANGE UP (ref 120–225)
LYMPHOCYTES # BLD AUTO: 2.24 K/UL — SIGNIFICANT CHANGE UP (ref 1–3.3)
LYMPHOCYTES # BLD AUTO: 24 % — SIGNIFICANT CHANGE UP (ref 13–44)
MCHC RBC-ENTMCNC: 29.3 GM/DL — LOW (ref 32–36)
MCHC RBC-ENTMCNC: 29.3 PG — SIGNIFICANT CHANGE UP (ref 27–34)
MCV RBC AUTO: 100 FL — SIGNIFICANT CHANGE UP (ref 80–100)
MONOCYTES # BLD AUTO: 0.19 K/UL — SIGNIFICANT CHANGE UP (ref 0–0.9)
MONOCYTES NFR BLD AUTO: 2 % — SIGNIFICANT CHANGE UP (ref 2–14)
NEUTROPHILS # BLD AUTO: 6.35 K/UL — SIGNIFICANT CHANGE UP (ref 1.8–7.4)
NEUTROPHILS NFR BLD AUTO: 67 % — SIGNIFICANT CHANGE UP (ref 43–77)
PLATELET # BLD AUTO: 24 K/UL — LOW (ref 150–400)
POTASSIUM SERPL-MCNC: 3.9 MMOL/L — SIGNIFICANT CHANGE UP (ref 3.5–5.3)
POTASSIUM SERPL-SCNC: 3.9 MMOL/L — SIGNIFICANT CHANGE UP (ref 3.5–5.3)
PROT SERPL-MCNC: 5.5 G/DL — LOW (ref 6–8.3)
RBC # BLD: 1.47 M/UL — LOW (ref 3.8–5.2)
RBC # FLD: 17.9 % — HIGH (ref 10.3–14.5)
RETICS #: 64.8 K/UL — SIGNIFICANT CHANGE UP (ref 25–125)
RETICS/RBC NFR: 4.5 % — HIGH (ref 0.5–2.5)
SARS-COV-2 RNA SPEC QL NAA+PROBE: DETECTED
SODIUM SERPL-SCNC: 137 MMOL/L — SIGNIFICANT CHANGE UP (ref 135–145)
TIBC SERPL-MCNC: 243 UG/DL — LOW (ref 250–450)
UIBC SERPL-MCNC: 55 UG/DL — LOW (ref 110–370)
WBC # BLD: 9.34 K/UL — SIGNIFICANT CHANGE UP (ref 3.8–10.5)
WBC # FLD AUTO: 9.34 K/UL — SIGNIFICANT CHANGE UP (ref 3.8–10.5)

## 2021-05-25 PROCEDURE — 99285 EMERGENCY DEPT VISIT HI MDM: CPT

## 2021-05-25 PROCEDURE — 99223 1ST HOSP IP/OBS HIGH 75: CPT | Mod: GC

## 2021-05-25 RX ORDER — CHOLECALCIFEROL (VITAMIN D3) 125 MCG
1 CAPSULE ORAL
Qty: 0 | Refills: 0 | DISCHARGE

## 2021-05-25 RX ORDER — SODIUM CHLORIDE 9 MG/ML
1000 INJECTION INTRAMUSCULAR; INTRAVENOUS; SUBCUTANEOUS ONCE
Refills: 0 | Status: COMPLETED | OUTPATIENT
Start: 2021-05-25 | End: 2021-05-25

## 2021-05-25 RX ORDER — ATORVASTATIN CALCIUM 80 MG/1
80 TABLET, FILM COATED ORAL AT BEDTIME
Refills: 0 | Status: DISCONTINUED | OUTPATIENT
Start: 2021-05-25 | End: 2021-05-28

## 2021-05-25 RX ORDER — ACETAMINOPHEN 500 MG
650 TABLET ORAL ONCE
Refills: 0 | Status: COMPLETED | OUTPATIENT
Start: 2021-05-25 | End: 2021-05-25

## 2021-05-25 RX ORDER — PANTOPRAZOLE SODIUM 20 MG/1
40 TABLET, DELAYED RELEASE ORAL EVERY 12 HOURS
Refills: 0 | Status: DISCONTINUED | OUTPATIENT
Start: 2021-05-25 | End: 2021-05-28

## 2021-05-25 RX ADMIN — ATORVASTATIN CALCIUM 80 MILLIGRAM(S): 80 TABLET, FILM COATED ORAL at 23:28

## 2021-05-25 RX ADMIN — PANTOPRAZOLE SODIUM 40 MILLIGRAM(S): 20 TABLET, DELAYED RELEASE ORAL at 23:28

## 2021-05-25 RX ADMIN — Medication 650 MILLIGRAM(S): at 20:27

## 2021-05-25 RX ADMIN — Medication 650 MILLIGRAM(S): at 23:30

## 2021-05-25 RX ADMIN — SODIUM CHLORIDE 1000 MILLILITER(S): 9 INJECTION INTRAMUSCULAR; INTRAVENOUS; SUBCUTANEOUS at 11:19

## 2021-05-25 NOTE — H&P ADULT - PROBLEM SELECTOR PLAN 3
Pt has hx of htn  will valsartan ,carvidilol at home   will hold all meds for htn in setting of low bp   resume as needed

## 2021-05-25 NOTE — H&P ADULT - NSHPPHYSICALEXAM_GEN_ALL_CORE
PHYSICAL EXAM:  GENERAL: NAD, lying in bed comfortably, pale   HEAD:  Atraumatic, Normocephalic  EYES: EOMI, PERRLA, conjunctiva and sclera clear  ENT: Moist mucous membranes  NECK: Supple, No JVD  CHEST/LUNG: Clear to auscultation bilaterally; No rales, rhonchi, wheezing, or rubs. Unlabored respirations  HEART: Regular rate and rhythm; No murmurs, rubs, or gallops  ABDOMEN: Bowel sounds present; Soft, Nontender, Nondistended.  EXTREMITIES:  2+ Peripheral Pulses, brisk capillary refill. No clubbing, cyanosis, or edema  NERVOUS SYSTEM:  Alert & Oriented X3, speech clear. No deficits   MSK: FROM all 4 extremities, full and equal strength  SKIN: No rashes or lesions

## 2021-05-25 NOTE — ED PROVIDER NOTE - CLINICAL SUMMARY MEDICAL DECISION MAKING FREE TEXT BOX
Patient with anemia and hypotension. Patient is asking for 2 units and wants to be discharge. Patient's blood pressure is concerning. Will give blood products and reassess.

## 2021-05-25 NOTE — ED ADULT NURSE NOTE - ED STAT RN HANDOFF DETAILS
Patient admitted to medicine first unit of PRBC completed. Patient assigned to [address] [place] [address] room 419, report given to SALENA Wilkins. Patient to be transported via wheelchair stable in no acute distress saftey maintained.

## 2021-05-25 NOTE — H&P ADULT - PROBLEM SELECTOR PLAN 4
[ ] Previous VTE                                    3  [ ] Thrombophilia                                  2  [ x] Lower limb paralysis                        2  (unable to hold up >15 seconds)    [ ] Current Cancer (within 6 months)        2   [x] Immobilization > 24 hrs                    1  [ ] ICU/CCU stay > 24 hrs                      1  [x] Age > 60                                         1    scd boots

## 2021-05-25 NOTE — ED PROVIDER NOTE - OBJECTIVE STATEMENT
76 y/o F patient with a significant PMHx of myelodysplastic syndrome, anemia presents to the ED with c/o anemia. Patient reports that she was seen in the ER 1-2days ago. Patient was found to have a hemoglobin of 7 and was told to come back to the ER for further evaluation. Patient states that she knows when her hemoglobin is low because she feel weak. Patient was found to be hypotensive.

## 2021-05-25 NOTE — ED ADULT NURSE NOTE - OBJECTIVE STATEMENT
Pt send in by Dr. Mosley for 2 units of PRBCs. pt c/o of weakness and found to be hypotensive in the 80s.

## 2021-05-25 NOTE — H&P ADULT - PROBLEM SELECTOR PLAN 1
p/w 4.3 hb, no apparent bleeding in setting of chronic anemia and mylodysplastic with baseline hb 8-9  mcv 100  f/u b12, folate  iron panel showed anemia of chronic disease  f/u schistocytes, haptoglobin, ldh  serina transfuse 3 prbc  f/u repeat cbc post transfusion p/w 4.3 hb, no apparent bleeding in setting of chronic anemia and mylodysplastic with baseline hb 8-9  mcv 100  f/u b12, folate  iron panel showed anemia of chronic disease  f/u schistocytes, haptoglobin, ldh to r/o hemolysis  serina transfuse 3 prbc  f/u repeat cbc post transfusion  oncologist Dr. Mosley

## 2021-05-25 NOTE — H&P ADULT - HISTORY OF PRESENT ILLNESS
75 yr F with PMH of MDS, HTN, right sided Carotid artery 70% stenosis, anemia induced HF in 2020, chronic anemia presents to the ED for low Hemoglobin.  75 yr F with PMH of MDS, HTN, right sided Carotid artery 70% stenosis, anemia induced HF in 2020, chronic anemia presents to the ED for low Hemoglobin. Patient says she has been feeling weak and this morning was feeling very dizzy. Her BP was very low when she checked 84/50. Reports taking her bp medication this am. Reports her hemoglobin was 7 on friday. Patient denies any hematuria, blood in stools or any other bleeding. She endorses having normal appetite no fever, chills, headache, other complaints.  Patient is 75 yr F with PMH of MDS, HTN, right sided Carotid artery 70% stenosis, anemia induced HF in 2020, chronic anemia presents to the ED for low Hemoglobin. Patient says she has been feeling weak and this morning was feeling very dizzy. Her BP was very low when she checked 84/50. Reports taking her bp medication this am. Reports her hemoglobin was 7 on friday. Patient denies any hematuria, blood in stools or any other bleeding. She endorses having normal appetite no fever, chills, headache, other complaints.

## 2021-05-25 NOTE — H&P ADULT - ATTENDING COMMENTS
Patient was interviewed and examined and discussed with Dr. Her.     She was referred to ED by Dr. Mosley, because of SOB.  Found to have Hb 4.3, he advised admission rather than just transfusion.  Hemoglobin was 7 last Friday.   She has been feeling weak for several days and this AM felt "dizzy."  She found her BP to be low at home this AM.   She lives at home with her , who is bedbound because of complications of vascular disease.  Daughter will come over to help him.   She denies chest pain, orthopnea. She has not had melena or hematochezia.     Alert, cooperative woman, who appears weak and pale.   Vital Signs Last 24 Hrs  T(C): 37.4 (25 May 2021 19:03), Max: 37.4 (25 May 2021 19:03)  T(F): 99.4 (25 May 2021 19:03), Max: 99.4 (25 May 2021 19:03)  HR: 81 (25 May 2021 19:03) (66 - 82)  BP: 133/47 (25 May 2021 19:03) (84/52 - 148/42)  BP(mean): --  RR: 16 (25 May 2021 19:03) (16 - 18)  SpO2: 100% (25 May 2021 19:03) (97% - 100%)  Lungs, clear  Cor, III/VI systolic murmur, which radiates to the neck.   Abdomen, soft, spleen, not appreciated.  Neurological, intact                        4.3    9.34  )-----------( 24       ( 25 May 2021 11:21 )             14.7     05-25    137  |  104  |  32<H>  ----------------------------<  180<H>  3.9   |  24  |  0.77    Ca    8.0<L>      25 May 2021 11:21    TPro  5.5<L>  /  Alb  2.6<L>  /  TBili  0.4  /  DBili  x   /  AST  17  /  ALT  19  /  AlkPhos  60  05-25    < from: Xray Chest 1 View- PORTABLE-Urgent (05.03.21 @ 12:52) >    Heart is normal for projection.    There are few small scattered irregular densities in the left upper lobe similar to CAT scan of March 19 of this year.    IMPRESSION: Scattered slight left upper lobe interstitial findings similar to prior CAT scan.    < end of copied text >    COVID-19 PCR: Detected: EUA/IVD COVID-19 PCR . (03.19.21 @ 12:51) COVID-19 PCR . (05.25.21 @ 14:22) COVID-19 PCR: Detected: EUA/IVD     IMP:  Weakness and SOB secondary to anemia, with underlying MDS.  Rapid decline in hemoglobin causes concern for bleeding or hemolysis.           COVID PCR is persistently positive.  Patient is asymptomatic.  Infection Control has advised that isolation is not required.   Plan:  Tx 2-3 units PRBC, until Hb > 7.0           w/u for hemolysis           Hematology consultation, Dr. Mosley.

## 2021-05-25 NOTE — H&P ADULT - ASSESSMENT
Patient is 75 yr F with PMH of MDS, HTN, right sided Carotid artery 70% stenosis, anemia induced HF in 2020, chronic anemia presents to the ED for low Hemoglobin requiring transfusions.

## 2021-05-25 NOTE — ED PROVIDER NOTE - PROGRESS NOTE DETAILS
H/H 4.3/14.7.  Case discussed with Dr. Mosley, will admit as patient will need more than 2 units of blood products.

## 2021-05-26 LAB
A1C WITH ESTIMATED AVERAGE GLUCOSE RESULT: 5.4 % — SIGNIFICANT CHANGE UP (ref 4–5.6)
ALBUMIN SERPL ELPH-MCNC: 2.6 G/DL — LOW (ref 3.5–5)
ALP SERPL-CCNC: 48 U/L — SIGNIFICANT CHANGE UP (ref 40–120)
ALT FLD-CCNC: 17 U/L DA — SIGNIFICANT CHANGE UP (ref 10–60)
ANION GAP SERPL CALC-SCNC: 3 MMOL/L — LOW (ref 5–17)
ANISOCYTOSIS BLD QL: SLIGHT — SIGNIFICANT CHANGE UP
AST SERPL-CCNC: 17 U/L — SIGNIFICANT CHANGE UP (ref 10–40)
BASOPHILS # BLD AUTO: 0 K/UL — SIGNIFICANT CHANGE UP (ref 0–0.2)
BASOPHILS NFR BLD AUTO: 0 % — SIGNIFICANT CHANGE UP (ref 0–2)
BILIRUB SERPL-MCNC: 0.6 MG/DL — SIGNIFICANT CHANGE UP (ref 0.2–1.2)
BUN SERPL-MCNC: 25 MG/DL — HIGH (ref 7–18)
CALCIUM SERPL-MCNC: 8.1 MG/DL — LOW (ref 8.4–10.5)
CHLORIDE SERPL-SCNC: 108 MMOL/L — SIGNIFICANT CHANGE UP (ref 96–108)
CHOLEST SERPL-MCNC: 105 MG/DL — SIGNIFICANT CHANGE UP
CO2 SERPL-SCNC: 27 MMOL/L — SIGNIFICANT CHANGE UP (ref 22–31)
COVID-19 NUCLEOCAPSID GAM AB INTERP: POSITIVE
COVID-19 NUCLEOCAPSID TOTAL GAM ANTIBODY RESULT: 190 INDEX — HIGH
COVID-19 SPIKE DOMAIN AB INTERP: POSITIVE
COVID-19 SPIKE DOMAIN AB INTERP: POSITIVE
COVID-19 SPIKE DOMAIN ANTIBODY RESULT: >250 U/ML — HIGH
COVID-19 SPIKE DOMAIN ANTIBODY RESULT: >250 U/ML — HIGH
CREAT SERPL-MCNC: 0.72 MG/DL — SIGNIFICANT CHANGE UP (ref 0.5–1.3)
DIR ANTIGLOB POLYSPECIFIC INTERPRETATION: SIGNIFICANT CHANGE UP
EOSINOPHIL # BLD AUTO: 0 K/UL — SIGNIFICANT CHANGE UP (ref 0–0.5)
EOSINOPHIL NFR BLD AUTO: 0 % — SIGNIFICANT CHANGE UP (ref 0–6)
ESTIMATED AVERAGE GLUCOSE: 108 MG/DL — SIGNIFICANT CHANGE UP (ref 68–114)
FERRITIN SERPL-MCNC: 677 NG/ML — HIGH (ref 15–150)
FOLATE SERPL-MCNC: 13.2 NG/ML — SIGNIFICANT CHANGE UP
GIANT PLATELETS BLD QL SMEAR: PRESENT — SIGNIFICANT CHANGE UP
GLUCOSE SERPL-MCNC: 91 MG/DL — SIGNIFICANT CHANGE UP (ref 70–99)
HAPTOGLOB SERPL-MCNC: 118 MG/DL — SIGNIFICANT CHANGE UP (ref 34–200)
HCT VFR BLD CALC: 23.5 % — LOW (ref 34.5–45)
HCT VFR BLD CALC: 24.2 % — LOW (ref 34.5–45)
HDLC SERPL-MCNC: 30 MG/DL — LOW
HGB BLD-MCNC: 7.7 G/DL — LOW (ref 11.5–15.5)
HGB BLD-MCNC: 8 G/DL — LOW (ref 11.5–15.5)
LG PLATELETS BLD QL AUTO: SLIGHT — SIGNIFICANT CHANGE UP
LIPID PNL WITH DIRECT LDL SERPL: 54 MG/DL — SIGNIFICANT CHANGE UP
LYMPHOCYTES # BLD AUTO: 2.11 K/UL — SIGNIFICANT CHANGE UP (ref 1–3.3)
LYMPHOCYTES # BLD AUTO: 25 % — SIGNIFICANT CHANGE UP (ref 13–44)
MACROCYTES BLD QL: SLIGHT — SIGNIFICANT CHANGE UP
MANUAL SMEAR VERIFICATION: SIGNIFICANT CHANGE UP
MCHC RBC-ENTMCNC: 29.8 PG — SIGNIFICANT CHANGE UP (ref 27–34)
MCHC RBC-ENTMCNC: 30 PG — SIGNIFICANT CHANGE UP (ref 27–34)
MCHC RBC-ENTMCNC: 32.8 GM/DL — SIGNIFICANT CHANGE UP (ref 32–36)
MCHC RBC-ENTMCNC: 33.1 GM/DL — SIGNIFICANT CHANGE UP (ref 32–36)
MCV RBC AUTO: 90.6 FL — SIGNIFICANT CHANGE UP (ref 80–100)
MCV RBC AUTO: 91.1 FL — SIGNIFICANT CHANGE UP (ref 80–100)
MICROCYTES BLD QL: SLIGHT — SIGNIFICANT CHANGE UP
MONOCYTES # BLD AUTO: 0.34 K/UL — SIGNIFICANT CHANGE UP (ref 0–0.9)
MONOCYTES NFR BLD AUTO: 4 % — SIGNIFICANT CHANGE UP (ref 2–14)
NEUTROPHILS # BLD AUTO: 5.73 K/UL — SIGNIFICANT CHANGE UP (ref 1.8–7.4)
NEUTROPHILS NFR BLD AUTO: 67 % — SIGNIFICANT CHANGE UP (ref 43–77)
NEUTS BAND # BLD: 1 % — SIGNIFICANT CHANGE UP (ref 0–8)
NON HDL CHOLESTEROL: 75 MG/DL — SIGNIFICANT CHANGE UP
NRBC # BLD: 5 /100 — HIGH (ref 0–0)
NRBC # BLD: 8 /100 WBCS — HIGH (ref 0–0)
OVALOCYTES BLD QL SMEAR: SLIGHT — SIGNIFICANT CHANGE UP
PLAT MORPH BLD: NORMAL — SIGNIFICANT CHANGE UP
PLATELET # BLD AUTO: 17 K/UL — CRITICAL LOW (ref 150–400)
PLATELET # BLD AUTO: 18 K/UL — CRITICAL LOW (ref 150–400)
POIKILOCYTOSIS BLD QL AUTO: SLIGHT — SIGNIFICANT CHANGE UP
POLYCHROMASIA BLD QL SMEAR: SLIGHT — SIGNIFICANT CHANGE UP
POTASSIUM SERPL-MCNC: 4 MMOL/L — SIGNIFICANT CHANGE UP (ref 3.5–5.3)
POTASSIUM SERPL-SCNC: 4 MMOL/L — SIGNIFICANT CHANGE UP (ref 3.5–5.3)
PROT SERPL-MCNC: 5.4 G/DL — LOW (ref 6–8.3)
RBC # BLD: 2.58 M/UL — LOW (ref 3.8–5.2)
RBC # BLD: 2.67 M/UL — LOW (ref 3.8–5.2)
RBC # FLD: 14.8 % — HIGH (ref 10.3–14.5)
RBC # FLD: 15.5 % — HIGH (ref 10.3–14.5)
RBC BLD AUTO: ABNORMAL
SARS-COV-2 IGG+IGM SERPL QL IA: 190 INDEX — HIGH
SARS-COV-2 IGG+IGM SERPL QL IA: >250 U/ML — HIGH
SARS-COV-2 IGG+IGM SERPL QL IA: >250 U/ML — HIGH
SARS-COV-2 IGG+IGM SERPL QL IA: POSITIVE
SODIUM SERPL-SCNC: 138 MMOL/L — SIGNIFICANT CHANGE UP (ref 135–145)
SPHEROCYTES BLD QL SMEAR: SLIGHT — SIGNIFICANT CHANGE UP
TRIGL SERPL-MCNC: 105 MG/DL — SIGNIFICANT CHANGE UP
TSH SERPL-MCNC: 1.37 UU/ML — SIGNIFICANT CHANGE UP (ref 0.34–4.82)
VARIANT LYMPHS # BLD: 3 % — SIGNIFICANT CHANGE UP (ref 0–6)
VIT B12 SERPL-MCNC: 323 PG/ML — SIGNIFICANT CHANGE UP (ref 232–1245)
WBC # BLD: 10.08 K/UL — SIGNIFICANT CHANGE UP (ref 3.8–10.5)
WBC # BLD: 8.43 K/UL — SIGNIFICANT CHANGE UP (ref 3.8–10.5)
WBC # FLD AUTO: 10.08 K/UL — SIGNIFICANT CHANGE UP (ref 3.8–10.5)
WBC # FLD AUTO: 8.43 K/UL — SIGNIFICANT CHANGE UP (ref 3.8–10.5)

## 2021-05-26 PROCEDURE — 99233 SBSQ HOSP IP/OBS HIGH 50: CPT | Mod: GC

## 2021-05-26 RX ORDER — VALSARTAN 80 MG/1
80 TABLET ORAL DAILY
Refills: 0 | Status: DISCONTINUED | OUTPATIENT
Start: 2021-05-26 | End: 2021-05-28

## 2021-05-26 RX ORDER — CARVEDILOL PHOSPHATE 80 MG/1
25 CAPSULE, EXTENDED RELEASE ORAL EVERY 12 HOURS
Refills: 0 | Status: DISCONTINUED | OUTPATIENT
Start: 2021-05-26 | End: 2021-05-28

## 2021-05-26 RX ORDER — LANOLIN ALCOHOL/MO/W.PET/CERES
3 CREAM (GRAM) TOPICAL AT BEDTIME
Refills: 0 | Status: DISCONTINUED | OUTPATIENT
Start: 2021-05-26 | End: 2021-05-28

## 2021-05-26 RX ADMIN — PANTOPRAZOLE SODIUM 40 MILLIGRAM(S): 20 TABLET, DELAYED RELEASE ORAL at 06:04

## 2021-05-26 RX ADMIN — PANTOPRAZOLE SODIUM 40 MILLIGRAM(S): 20 TABLET, DELAYED RELEASE ORAL at 18:15

## 2021-05-26 RX ADMIN — CARVEDILOL PHOSPHATE 25 MILLIGRAM(S): 80 CAPSULE, EXTENDED RELEASE ORAL at 14:36

## 2021-05-26 RX ADMIN — VALSARTAN 80 MILLIGRAM(S): 80 TABLET ORAL at 14:36

## 2021-05-26 RX ADMIN — ATORVASTATIN CALCIUM 80 MILLIGRAM(S): 80 TABLET, FILM COATED ORAL at 21:02

## 2021-05-26 RX ADMIN — CARVEDILOL PHOSPHATE 25 MILLIGRAM(S): 80 CAPSULE, EXTENDED RELEASE ORAL at 21:02

## 2021-05-26 RX ADMIN — Medication 3 MILLIGRAM(S): at 21:02

## 2021-05-26 NOTE — PROGRESS NOTE ADULT - PROBLEM SELECTOR PLAN 1
p/w 4.3 hb, no apparent bleeding in setting of chronic anemia and mylodysplastic with baseline hb 8-9  mcv 100  f/u b12, folate  iron panel showed anemia of chronic disease  f/u schistocytes, haptoglobin, ldh to r/o hemolysis  serina transfuse 3 prbc  f/u repeat cbc post transfusion  oncologist Dr. Mosley p/w 4.3 hb,  No active bleeding   Likely chronic anemia and mylodysplastic  mcv 100  f/u b12, folate  iron panel showed anemia of chronic disease  f/u schistocytes, haptoglobin, ldh to r/o hemolysis  s/p 3 prbc to date  trend CBC  oncologist Dr. Mosley

## 2021-05-26 NOTE — PROGRESS NOTE ADULT - PROBLEM SELECTOR PLAN 4
[ ] Previous VTE                                    3  [ ] Thrombophilia                                  2  [ x] Lower limb paralysis                        2  (unable to hold up >15 seconds)    [ ] Current Cancer (within 6 months)        2   [x] Immobilization > 24 hrs                    1  [ ] ICU/CCU stay > 24 hrs                      1  [x] Age > 60                                         1    scd boots [ ] Previous VTE                                    3  [ ] Thrombophilia                                  2  [ x] Lower limb paralysis                        2  (unable to hold up >15 seconds)    [ ] Current Cancer (within 6 months)        2   [x] Immobilization > 24 hrs                    1  [ ] ICU/CCU stay > 24 hrs                      1  [x] Age > 60                                         1    holding A/C due to anemia    scd boots

## 2021-05-26 NOTE — CONSULT NOTE ADULT - SUBJECTIVE AND OBJECTIVE BOX
Patient is a 75y old  Female who presents with a chief complaint of anemia (26 May 2021 09:49)      HPI:  Patient is 75 yr F with PMH of MDS, HTN, right sided Carotid artery 70% stenosis, anemia induced HF in 2020, chronic anemia presents to the ED for low Hemoglobin. Patient says she has been feeling weak and this morning was feeling very dizzy. Her BP was very low when she checked 84/50. Reports taking her bp medication this am. Reports her hemoglobin was 7 on friday. Patient denies any hematuria, blood in stools or any other bleeding. She endorses having normal appetite no fever, chills, headache, other complaints.  (25 May 2021 14:29)there is no bleeding noticed.       ROS:  Negative except for:    PAST MEDICAL & SURGICAL HISTORY:  Hypertension    Anemia    Myelodysplastic syndrome        SOCIAL HISTORY:    FAMILY HISTORY:      MEDICATIONS  (STANDING):  atorvastatin 80 milliGRAM(s) Oral at bedtime  pantoprazole  Injectable 40 milliGRAM(s) IV Push every 12 hours    MEDICATIONS  (PRN):      Allergies    penicillin (Hives)    Intolerances        Vital Signs Last 24 Hrs  T(C): 36.7 (26 May 2021 05:00), Max: 37.4 (25 May 2021 19:03)  T(F): 98 (26 May 2021 05:00), Max: 99.4 (25 May 2021 19:03)  HR: 83 (26 May 2021 05:00) (66 - 105)  BP: 171/54 (26 May 2021 05:00) (84/52 - 172/63)  BP(mean): --  RR: 17 (26 May 2021 05:00) (15 - 18)  SpO2: 98% (26 May 2021 05:00) (97% - 100%)    PHYSICAL EXAM  General: adult in NAD  HEENT: clear oropharynx, anicteric sclera, pink conjunctiva  Neck: supple  CV: normal S1/S2 with no murmur rubs or gallops  Lungs: positive air movement b/l ant lungs,clear to auscultation, no wheezes, no rales  Abdomen: soft non-tender non-distended, no hepatosplenomegaly  Ext: no clubbing cyanosis or edema  Skin: no rashes and no petechiae  Neuro: alert and oriented X 4, no focal deficits      LABS:                          7.7    8.43  )-----------( 17       ( 26 May 2021 08:21 )             23.5         Mean Cell Volume : 91.1 fl  Mean Cell Hemoglobin : 29.8 pg  Mean Cell Hemoglobin Concentration : 32.8 gm/dL  Auto Neutrophil # : x  Auto Lymphocyte # : x  Auto Monocyte # : x  Auto Eosinophil # : x  Auto Basophil # : x  Auto Neutrophil % : x  Auto Lymphocyte % : x  Auto Monocyte % : x  Auto Eosinophil % : x  Auto Basophil % : x      Serial CBC's  05-26 @ 08:21  Hct-23.5 / Hgb-7.7 / Plat-17 / RBC-2.58 / WBC-8.43  Serial CBC's  05-26 @ 02:40  Hct-24.2 / Hgb-8.0 / Plat-18 / RBC-2.67 / WBC-10.08  Serial CBC's  05-25 @ 11:21  Hct-14.7 / Hgb-4.3 / Plat-24 / RBC-1.47 / WBC-9.34      05-26    138  |  108  |  25<H>  ----------------------------<  91  4.0   |  27  |  0.72    Ca    8.1<L>      26 May 2021 08:21    TPro  5.4<L>  /  Alb  2.6<L>  /  TBili  0.6  /  DBili  x   /  AST  17  /  ALT  17  /  AlkPhos  48  05-26          Ferritin, Serum: 677 ng/mL (05-25 @ 21:25)  Iron - Total Binding Capacity.: 243 ug/dL (05-25 @ 14:22)  Reticulocyte Percent: 4.5 % (05-25 @ 11:21)              BLOOD SMEAR INTERPRETATION:       RADIOLOGY & ADDITIONAL STUDIES:

## 2021-05-26 NOTE — CONSULT NOTE ADULT - ASSESSMENT
73 year old lady with pancytopenia from MDS on chemo, decitabine.  she felt very weak and came to ER.  Hb is 4.5.  She has COVID 2 m ago but still tested positive for it.  no bleeding noticed.    1. severe anemia, she has anemia from MDS  on decitabine  the H/H dropped very fast  will r/o hemolysis  f/u on haptoglobin  check direct [facility]    2 persistent COVID+  because of her underlying disease, she probably not able to mount immunity against covid  will give 1 u covid plasma.

## 2021-05-26 NOTE — PROGRESS NOTE ADULT - PROBLEM SELECTOR PLAN 3
Pt has hx of htn  will valsartan ,carvidilol at home   will hold all meds for htn in setting of low bp   resume as needed uncontrolled  initally held home meds   valsartan ,carvidilol home meds resumed with parameters   monitor BP

## 2021-05-26 NOTE — PROGRESS NOTE ADULT - SUBJECTIVE AND OBJECTIVE BOX
-*-* INCOMPLETE  `NP Note discussed with  Primary Attending    Patient is a 75y old  Female who presents with a chief complaint of anemia (26 May 2021 09:52)      INTERVAL HPI/OVERNIGHT EVENTS: No acute medical complaints     MEDICATIONS  (STANDING):  atorvastatin 80 milliGRAM(s) Oral at bedtime  pantoprazole  Injectable 40 milliGRAM(s) IV Push every 12 hours    MEDICATIONS  (PRN):      __________________________________________________  REVIEW OF SYSTEMS:    CONSTITUTIONAL: No fever,   EYES: no acute visual disturbances  NECK: No pain or stiffness  RESPIRATORY: No cough; No shortness of breath  CARDIOVASCULAR: No chest pain, no palpitations  GASTROINTESTINAL: No pain. No nausea or vomiting; No diarrhea   NEUROLOGICAL: No headache or numbness, no tremors  MUSCULOSKELETAL: No joint pain, no muscle pain  GENITOURINARY: no dysuria, no frequency, no hesitancy  PSYCHIATRY: no depression , no anxiety  ALL OTHER  ROS negative        Vital Signs Last 24 Hrs  T(C): 37.2 (26 May 2021 12:10), Max: 37.4 (25 May 2021 19:03)  T(F): 98.9 (26 May 2021 12:10), Max: 99.4 (25 May 2021 19:03)  HR: 77 (26 May 2021 12:10) (66 - 105)  BP: 154/49 (26 May 2021 12:10) (102/49 - 172/63)  BP(mean): --  RR: 16 (26 May 2021 12:10) (15 - 18)  SpO2: 97% (26 May 2021 12:10) (97% - 100%)    ________________________________________________  PHYSICAL EXAM:  GENERAL: NAD  HEENT: Normocephalic;  conjunctivae and sclerae clear; moist mucous membranes;   NECK : supple  CHEST/LUNG: Clear to auscultation bilaterally with good air entry   HEART: S1 S2  regular; no murmurs, gallops or rubs  ABDOMEN: Soft, Nontender, Nondistended; Bowel sounds present  EXTREMITIES: no cyanosis; no edema; no calf tenderness  SKIN: warm and dry; no rash  NERVOUS SYSTEM:  Awake and alert; Oriented  to place, person and time ; no new deficits    _________________________________________________  LABS:                        7.7    8.43  )-----------( 17       ( 26 May 2021 08:21 )             23.5     05-26    138  |  108  |  25<H>  ----------------------------<  91  4.0   |  27  |  0.72    Ca    8.1<L>      26 May 2021 08:21    TPro  5.4<L>  /  Alb  2.6<L>  /  TBili  0.6  /  DBili  x   /  AST  17  /  ALT  17  /  AlkPhos  48  05-26        CAPILLARY BLOOD GLUCOSE            RADIOLOGY & ADDITIONAL TESTS:    Imaging Personally Reviewed:  YES/NO    Consultant(s) Notes Reviewed:   YES/ No    Care Discussed with Consultants :     Plan of care was discussed with patient and /or primary care giver; all questions and concerns were addressed and care was aligned with patient's wishes.     `NP Note discussed with  Primary Attending    Patient is a 75y old  Female who presents with a chief complaint of anemia (26 May 2021 09:52)      INTERVAL HPI/OVERNIGHT EVENTS: No acute medical complaints     MEDICATIONS  (STANDING):  atorvastatin 80 milliGRAM(s) Oral at bedtime  pantoprazole  Injectable 40 milliGRAM(s) IV Push every 12 hours    MEDICATIONS  (PRN):      __________________________________________________  REVIEW OF SYSTEMS:    CONSTITUTIONAL: No fever, +fatigue   EYES: no acute visual disturbances  NECK: No pain or stiffness  RESPIRATORY: No cough; No shortness of breath  CARDIOVASCULAR: No chest pain, no palpitations  GASTROINTESTINAL: No pain. No nausea or vomiting; No diarrhea   NEUROLOGICAL: No headache or numbness, no tremors  MUSCULOSKELETAL: No joint pain, no muscle pain  GENITOURINARY: no dysuria, no frequency, no hesitancy  PSYCHIATRY: no depression , no anxiety  ALL OTHER  ROS negative        Vital Signs Last 24 Hrs  T(C): 37.2 (26 May 2021 12:10), Max: 37.4 (25 May 2021 19:03)  T(F): 98.9 (26 May 2021 12:10), Max: 99.4 (25 May 2021 19:03)  HR: 77 (26 May 2021 12:10) (66 - 105)  BP: 154/49 (26 May 2021 12:10) (102/49 - 172/63)  BP(mean): --  RR: 16 (26 May 2021 12:10) (15 - 18)  SpO2: 97% (26 May 2021 12:10) (97% - 100%)    ________________________________________________  PHYSICAL EXAM:  GENERAL: NAD  HEENT: Normocephalic;  conjunctivae and sclerae clear;  NECK : supple  CHEST/LUNG: Clear to auscultation bilaterally with good air entry   HEART: S1 S2  regular; no murmurs, gallops or rubs  ABDOMEN: Soft, Nontender, Nondistended; Bowel sounds present  EXTREMITIES: no cyanosis; no edema; no calf tenderness  SKIN: warm and dry; no rash  NERVOUS SYSTEM:  Awake and alert; Oriented  to place, person and time     _________________________________________________  LABS:                        7.7    8.43  )-----------( 17       ( 26 May 2021 08:21 )             23.5     05-26    138  |  108  |  25<H>  ----------------------------<  91  4.0   |  27  |  0.72    Ca    8.1<L>      26 May 2021 08:21    TPro  5.4<L>  /  Alb  2.6<L>  /  TBili  0.6  /  DBili  x   /  AST  17  /  ALT  17  /  AlkPhos  48  05-26        CAPILLARY BLOOD GLUCOSE      Consultant(s) Notes Reviewed:   YEs        Plan of care was discussed with patient and /or primary care giver; all questions and concerns were addressed and care was aligned with patient's wishes.

## 2021-05-26 NOTE — PROGRESS NOTE ADULT - ASSESSMENT
Patient is 75 yr F with PMH of MDS, HTN, right sided Carotid artery 70% stenosis, anemia induced HF in 2020, chronic anemia presents to the ED for low Hemoglobin requiring transfusions. Patient is 75 yr F with PMH of MDS, chronic anemia, anemia induced HF, HTN,  right sided Carotid artery 70% stenosis. Presents to the ED with complaints of SOB found with a hemaglobin of 4.3, and platlets of 18k. Admitted to medicine for symptomatic anemia and hemolytic work up. S/P 3U PRBCs with mild improvement of H|H. Noted with persistently positive COVID PCR since March. S/P convalescent plasma as recc by heme onc Dr Mosley.

## 2021-05-27 DIAGNOSIS — Z02.9 ENCOUNTER FOR ADMINISTRATIVE EXAMINATIONS, UNSPECIFIED: ICD-10-CM

## 2021-05-27 LAB
ANION GAP SERPL CALC-SCNC: 8 MMOL/L — SIGNIFICANT CHANGE UP (ref 5–17)
ANISOCYTOSIS BLD QL: SLIGHT — SIGNIFICANT CHANGE UP
BUN SERPL-MCNC: 27 MG/DL — HIGH (ref 7–18)
CALCIUM SERPL-MCNC: 7.6 MG/DL — LOW (ref 8.4–10.5)
CHLORIDE SERPL-SCNC: 107 MMOL/L — SIGNIFICANT CHANGE UP (ref 96–108)
CO2 SERPL-SCNC: 25 MMOL/L — SIGNIFICANT CHANGE UP (ref 22–31)
CREAT SERPL-MCNC: 0.74 MG/DL — SIGNIFICANT CHANGE UP (ref 0.5–1.3)
GLUCOSE SERPL-MCNC: 92 MG/DL — SIGNIFICANT CHANGE UP (ref 70–99)
HAPTOGLOB SERPL-MCNC: 118 MG/DL — SIGNIFICANT CHANGE UP (ref 34–200)
HCT VFR BLD CALC: 20.4 % — CRITICAL LOW (ref 34.5–45)
HCT VFR BLD CALC: 22.9 % — LOW (ref 34.5–45)
HGB BLD-MCNC: 6.4 G/DL — CRITICAL LOW (ref 11.5–15.5)
HGB BLD-MCNC: 7.1 G/DL — LOW (ref 11.5–15.5)
HYPOCHROMIA BLD QL: SLIGHT — SIGNIFICANT CHANGE UP
LDH SERPL L TO P-CCNC: 187 U/L — SIGNIFICANT CHANGE UP (ref 120–225)
LDH SERPL L TO P-CCNC: 218 U/L — SIGNIFICANT CHANGE UP (ref 120–225)
MANUAL SMEAR VERIFICATION: SIGNIFICANT CHANGE UP
MCHC RBC-ENTMCNC: 29.7 PG — SIGNIFICANT CHANGE UP (ref 27–34)
MCHC RBC-ENTMCNC: 29.8 PG — SIGNIFICANT CHANGE UP (ref 27–34)
MCHC RBC-ENTMCNC: 31 GM/DL — LOW (ref 32–36)
MCHC RBC-ENTMCNC: 31.4 GM/DL — LOW (ref 32–36)
MCV RBC AUTO: 94.9 FL — SIGNIFICANT CHANGE UP (ref 80–100)
MCV RBC AUTO: 95.8 FL — SIGNIFICANT CHANGE UP (ref 80–100)
NRBC # BLD: 8 /100 WBCS — HIGH (ref 0–0)
NRBC # BLD: 8 /100 WBCS — HIGH (ref 0–0)
OB PNL STL: POSITIVE
PLAT MORPH BLD: NORMAL — SIGNIFICANT CHANGE UP
PLATELET # BLD AUTO: 14 K/UL — CRITICAL LOW (ref 150–400)
PLATELET # BLD AUTO: 16 K/UL — CRITICAL LOW (ref 150–400)
PLATELET COUNT - ESTIMATE: ABNORMAL
POIKILOCYTOSIS BLD QL AUTO: SLIGHT — SIGNIFICANT CHANGE UP
POTASSIUM SERPL-MCNC: 4 MMOL/L — SIGNIFICANT CHANGE UP (ref 3.5–5.3)
POTASSIUM SERPL-SCNC: 4 MMOL/L — SIGNIFICANT CHANGE UP (ref 3.5–5.3)
RBC # BLD: 2.15 M/UL — LOW (ref 3.8–5.2)
RBC # BLD: 2.39 M/UL — LOW (ref 3.8–5.2)
RBC # FLD: 16.9 % — HIGH (ref 10.3–14.5)
RBC # FLD: 17.6 % — HIGH (ref 10.3–14.5)
RBC BLD AUTO: ABNORMAL
SODIUM SERPL-SCNC: 140 MMOL/L — SIGNIFICANT CHANGE UP (ref 135–145)
WBC # BLD: 5.93 K/UL — SIGNIFICANT CHANGE UP (ref 3.8–10.5)
WBC # BLD: 6.25 K/UL — SIGNIFICANT CHANGE UP (ref 3.8–10.5)
WBC # FLD AUTO: 5.93 K/UL — SIGNIFICANT CHANGE UP (ref 3.8–10.5)
WBC # FLD AUTO: 6.25 K/UL — SIGNIFICANT CHANGE UP (ref 3.8–10.5)

## 2021-05-27 PROCEDURE — 99233 SBSQ HOSP IP/OBS HIGH 50: CPT | Mod: GC

## 2021-05-27 RX ADMIN — Medication 3 MILLIGRAM(S): at 21:51

## 2021-05-27 RX ADMIN — VALSARTAN 80 MILLIGRAM(S): 80 TABLET ORAL at 05:31

## 2021-05-27 RX ADMIN — CARVEDILOL PHOSPHATE 25 MILLIGRAM(S): 80 CAPSULE, EXTENDED RELEASE ORAL at 05:31

## 2021-05-27 RX ADMIN — ATORVASTATIN CALCIUM 80 MILLIGRAM(S): 80 TABLET, FILM COATED ORAL at 21:51

## 2021-05-27 RX ADMIN — PANTOPRAZOLE SODIUM 40 MILLIGRAM(S): 20 TABLET, DELAYED RELEASE ORAL at 17:33

## 2021-05-27 RX ADMIN — CARVEDILOL PHOSPHATE 25 MILLIGRAM(S): 80 CAPSULE, EXTENDED RELEASE ORAL at 17:33

## 2021-05-27 RX ADMIN — PANTOPRAZOLE SODIUM 40 MILLIGRAM(S): 20 TABLET, DELAYED RELEASE ORAL at 05:31

## 2021-05-27 NOTE — PROGRESS NOTE ADULT - ASSESSMENT
Patient is 75 yr F with PMH of MDS, chronic anemia, anemia induced HF, HTN,  right sided Carotid artery 70% stenosis. Presents to the ED with complaints of SOB found with a hemaglobin of 4.3, and platlets of 18k. Admitted to medicine for symptomatic anemia and hemolytic work up. S/P 3U PRBCs with mild improvement of H|H. Noted with persistently positive COVID PCR since March. S/P convalescent plasma as recc by heme onc Dr Mosley.   seen and examined pt at bedside, reports feeling better, Hg 6.4 in AM , repeated 7.1 , will give 1 unit of PRBC, no c/o voiced

## 2021-05-27 NOTE — PROGRESS NOTE ADULT - SUBJECTIVE AND OBJECTIVE BOX
feel ok  no sob or excessive fatigue  no bleeding      MEDICATIONS  (STANDING):  atorvastatin 80 milliGRAM(s) Oral at bedtime  carvedilol 25 milliGRAM(s) Oral every 12 hours  melatonin 3 milliGRAM(s) Oral at bedtime  pantoprazole  Injectable 40 milliGRAM(s) IV Push every 12 hours  valsartan 80 milliGRAM(s) Oral daily    MEDICATIONS  (PRN):      Allergies    penicillin (Hives)    Intolerances        Vital Signs Last 24 Hrs  T(C): 36.6 (27 May 2021 04:20), Max: 37.3 (26 May 2021 17:20)  T(F): 97.8 (27 May 2021 04:20), Max: 99.1 (26 May 2021 17:20)  HR: 71 (27 May 2021 04:20) (71 - 81)  BP: 123/47 (27 May 2021 04:20) (93/34 - 167/51)  BP(mean): --  RR: 16 (27 May 2021 04:20) (15 - 16)  SpO2: 99% (27 May 2021 04:20) (96% - 100%)    PHYSICAL EXAM  General: adult in NAD  HEENT: clear oropharynx, anicteric sclera, pink conjunctiva  Neck: supple  CV: normal S1/S2 with no murmur rubs or gallops  Lungs: positive air movement b/l ant lungs,clear to auscultation, no wheezes, no rales  Abdomen: soft non-tender non-distended, no hepatosplenomegaly  Ext: no clubbing cyanosis or edema  Skin: no rashes and no petechiae  Neuro: alert and oriented X 4, no focal deficits  LABS:                          6.4    5.93  )-----------( 14       ( 27 May 2021 06:06 )             20.4         Mean Cell Volume : 94.9 fl  Mean Cell Hemoglobin : 29.8 pg  Mean Cell Hemoglobin Concentration : 31.4 gm/dL  Auto Neutrophil # : x  Auto Lymphocyte # : x  Auto Monocyte # : x  Auto Eosinophil # : x  Auto Basophil # : x  Auto Neutrophil % : x  Auto Lymphocyte % : x  Auto Monocyte % : x  Auto Eosinophil % : x  Auto Basophil % : x    Serial CBC  Hematocrit 20.4  Hemoglobin 6.4  Plat 14  RBC 2.15  WBC 5.93  Serial CBC  Hematocrit 23.5  Hemoglobin 7.7  Plat 17  RBC 2.58  WBC 8.43  Serial CBC  Hematocrit 24.2  Hemoglobin 8.0  Plat 18  RBC 2.67  WBC 10.08  Serial CBC  Hematocrit 14.7  Hemoglobin 4.3  Plat 24  RBC 1.47  WBC 9.34    05-27    140  |  107  |  27<H>  ----------------------------<  92  4.0   |  25  |  0.74    Ca    7.6<L>      27 May 2021 06:06    TPro  5.4<L>  /  Alb  2.6<L>  /  TBili  0.6  /  DBili  x   /  AST  17  /  ALT  17  /  AlkPhos  48  05-26          Folate, Serum: 13.2 ng/mL (05-26 @ 13:44)  Vitamin B12, Serum: 323 pg/mL (05-26 @ 13:44)  Ferritin, Serum: 677 ng/mL (05-25 @ 21:25)  Iron - Total Binding Capacity.: 243 ug/dL (05-25 @ 14:22)  Reticulocyte Percent: 4.5 % (05-25 @ 11:21)            BLOOD SMEAR INTERPRETATION:       RADIOLOGY & ADDITIONAL STUDIES:

## 2021-05-27 NOTE — PROGRESS NOTE ADULT - PROBLEM SELECTOR PLAN 3
uncontrolled  initially held home meds  valsartan ,carvedilol home meds resumed with parameters   soft BP SBP around 100mmHg   monitor BP and will adjust meds if needed

## 2021-05-27 NOTE — PROGRESS NOTE ADULT - ASSESSMENT
· Assessment	  73 year old lady with pancytopenia from MDS on chemo, decitabine.  she felt very weak and came to ER.  Hb is 4.5.  She has COVID 2 m ago but still tested positive for it.  no bleeding noticed.    1. severe anemia, she has anemia from MDS  on decitabine  the H/H dropped very fast  will r/o hemolysis  f/u on haptoglobin  check direct [facility]    2 persistent COVID+  because of her underlying disease, she probably not able to mount immunity against covid  had 1 u covid plasma.    3. Hb dropped to 6.4 again from 7.7  will repeat CBC. LDH, haptoglobin  if it remains low, will transfuse  direct webb neg.  LDH and haptoglobin normal at admission

## 2021-05-27 NOTE — PROGRESS NOTE ADULT - SUBJECTIVE AND OBJECTIVE BOX
NP Note discussed with  Primary Attending    Patient is a 75y old  Female who presents with a chief complaint of anemia (27 May 2021 08:57)      INTERVAL HPI/OVERNIGHT EVENTS: no new complaints    MEDICATIONS  (STANDING):  atorvastatin 80 milliGRAM(s) Oral at bedtime  carvedilol 25 milliGRAM(s) Oral every 12 hours  melatonin 3 milliGRAM(s) Oral at bedtime  pantoprazole  Injectable 40 milliGRAM(s) IV Push every 12 hours  valsartan 80 milliGRAM(s) Oral daily    MEDICATIONS  (PRN):      __________________________________________________  REVIEW OF SYSTEMS:    CONSTITUTIONAL: No fever,   EYES: no acute visual disturbances  NECK: No pain or stiffness  RESPIRATORY: No cough; No shortness of breath  CARDIOVASCULAR: No chest pain, no palpitations  GASTROINTESTINAL: No pain. No nausea or vomiting; No diarrhea   NEUROLOGICAL: No headache or numbness, no tremors  MUSCULOSKELETAL: No joint pain, no muscle pain  GENITOURINARY: no dysuria, no frequency, no hesitancy  PSYCHIATRY: no depression , no anxiety  ALL OTHER  ROS negative        Vital Signs Last 24 Hrs  T(C): 36.6 (27 May 2021 10:41), Max: 37.3 (26 May 2021 17:20)  T(F): 97.8 (27 May 2021 10:41), Max: 99.1 (26 May 2021 17:20)  HR: 66 (27 May 2021 10:41) (66 - 81)  BP: 103/34 (27 May 2021 10:41) (93/34 - 167/51)  BP(mean): --  RR: 17 (27 May 2021 10:41) (15 - 17)  SpO2: 97% (27 May 2021 10:41) (96% - 100%)    ________________________________________________  PHYSICAL EXAM:  GENERAL: NAD  HEENT: Normocephalic;  conjunctivae and sclerae clear; moist mucous membranes;   NECK : supple  CHEST/LUNG: Clear to auscultation bilaterally with good air entry   HEART: S1 S2  regular; no murmurs, gallops or rubs  ABDOMEN: Soft, Nontender, Nondistended; Bowel sounds present  EXTREMITIES: no cyanosis; no edema; no calf tenderness  SKIN: warm and dry; no rash  NERVOUS SYSTEM:  Awake and alert; Oriented  to place, person and time ; no new deficits    _________________________________________________  LABS:                        7.1    6.25  )-----------( 16       ( 27 May 2021 09:35 )             22.9     05-27    140  |  107  |  27<H>  ----------------------------<  92  4.0   |  25  |  0.74    Ca    7.6<L>      27 May 2021 06:06    TPro  5.4<L>  /  Alb  2.6<L>  /  TBili  0.6  /  DBili  x   /  AST  17  /  ALT  17  /  AlkPhos  48  05-26        CAPILLARY BLOOD GLUCOSE            RADIOLOGY & ADDITIONAL TESTS:    Imaging Personally Reviewed:  NO    Consultant(s) Notes Reviewed:   YES    Care Discussed with Consultants : heme/onc     Plan of care was discussed with patient and /or primary care giver; all questions and concerns were addressed and care was aligned with patient's wishes.

## 2021-05-27 NOTE — PROGRESS NOTE ADULT - PROBLEM SELECTOR PLAN 1
p/w 4.3 hb,  No active bleeding   Likely chronic anemia and myelodysplastic  mcv 100  iron panel showed anemia of chronic disease  no evidenced of hemolysis based on  schistocytes, haptoglobin, ldh  s/p 4 prbc to date ( last dose 5/27)   trend CBC  oncologist Dr. Mosley

## 2021-05-28 ENCOUNTER — TRANSCRIPTION ENCOUNTER (OUTPATIENT)
Age: 76
End: 2021-05-28

## 2021-05-28 VITALS — HEART RATE: 62 BPM | SYSTOLIC BLOOD PRESSURE: 148 MMHG | DIASTOLIC BLOOD PRESSURE: 53 MMHG

## 2021-05-28 LAB
HCT VFR BLD CALC: 26.4 % — LOW (ref 34.5–45)
HGB BLD-MCNC: 8.5 G/DL — LOW (ref 11.5–15.5)
MCHC RBC-ENTMCNC: 30.7 PG — SIGNIFICANT CHANGE UP (ref 27–34)
MCHC RBC-ENTMCNC: 32.2 GM/DL — SIGNIFICANT CHANGE UP (ref 32–36)
MCV RBC AUTO: 95.3 FL — SIGNIFICANT CHANGE UP (ref 80–100)
NRBC # BLD: 6 /100 WBCS — HIGH (ref 0–0)
PLATELET # BLD AUTO: 13 K/UL — CRITICAL LOW (ref 150–400)
RBC # BLD: 2.77 M/UL — LOW (ref 3.8–5.2)
RBC # FLD: 17.2 % — HIGH (ref 10.3–14.5)
WBC # BLD: 4.78 K/UL — SIGNIFICANT CHANGE UP (ref 3.8–10.5)
WBC # FLD AUTO: 4.78 K/UL — SIGNIFICANT CHANGE UP (ref 3.8–10.5)

## 2021-05-28 PROCEDURE — 99238 HOSP IP/OBS DSCHRG MGMT 30/<: CPT

## 2021-05-28 PROCEDURE — 85027 COMPLETE CBC AUTOMATED: CPT

## 2021-05-28 PROCEDURE — 83010 ASSAY OF HAPTOGLOBIN QUANT: CPT

## 2021-05-28 PROCEDURE — 86900 BLOOD TYPING SEROLOGIC ABO: CPT

## 2021-05-28 PROCEDURE — 80053 COMPREHEN METABOLIC PANEL: CPT

## 2021-05-28 PROCEDURE — 36430 TRANSFUSION BLD/BLD COMPNT: CPT

## 2021-05-28 PROCEDURE — 82728 ASSAY OF FERRITIN: CPT

## 2021-05-28 PROCEDURE — 84443 ASSAY THYROID STIM HORMONE: CPT

## 2021-05-28 PROCEDURE — 99285 EMERGENCY DEPT VISIT HI MDM: CPT

## 2021-05-28 PROCEDURE — 93005 ELECTROCARDIOGRAM TRACING: CPT

## 2021-05-28 PROCEDURE — 83540 ASSAY OF IRON: CPT

## 2021-05-28 PROCEDURE — 86850 RBC ANTIBODY SCREEN: CPT

## 2021-05-28 PROCEDURE — 85045 AUTOMATED RETICULOCYTE COUNT: CPT

## 2021-05-28 PROCEDURE — 82607 VITAMIN B-12: CPT

## 2021-05-28 PROCEDURE — 80061 LIPID PANEL: CPT

## 2021-05-28 PROCEDURE — 80048 BASIC METABOLIC PNL TOTAL CA: CPT

## 2021-05-28 PROCEDURE — 83550 IRON BINDING TEST: CPT

## 2021-05-28 PROCEDURE — 82746 ASSAY OF FOLIC ACID SERUM: CPT

## 2021-05-28 PROCEDURE — 86769 SARS-COV-2 COVID-19 ANTIBODY: CPT

## 2021-05-28 PROCEDURE — 83615 LACTATE (LD) (LDH) ENZYME: CPT

## 2021-05-28 PROCEDURE — 86923 COMPATIBILITY TEST ELECTRIC: CPT

## 2021-05-28 PROCEDURE — P9040: CPT

## 2021-05-28 PROCEDURE — 82272 OCCULT BLD FECES 1-3 TESTS: CPT

## 2021-05-28 PROCEDURE — 86901 BLOOD TYPING SEROLOGIC RH(D): CPT

## 2021-05-28 PROCEDURE — 85025 COMPLETE CBC W/AUTO DIFF WBC: CPT

## 2021-05-28 PROCEDURE — 87635 SARS-COV-2 COVID-19 AMP PRB: CPT

## 2021-05-28 PROCEDURE — 83036 HEMOGLOBIN GLYCOSYLATED A1C: CPT

## 2021-05-28 PROCEDURE — 36415 COLL VENOUS BLD VENIPUNCTURE: CPT

## 2021-05-28 PROCEDURE — 86880 COOMBS TEST DIRECT: CPT

## 2021-05-28 RX ORDER — ERYTHROPOIETIN 10000 [IU]/ML
20000 INJECTION, SOLUTION INTRAVENOUS; SUBCUTANEOUS ONCE
Refills: 0 | Status: COMPLETED | OUTPATIENT
Start: 2021-05-28 | End: 2021-05-28

## 2021-05-28 RX ADMIN — ERYTHROPOIETIN 20000 UNIT(S): 10000 INJECTION, SOLUTION INTRAVENOUS; SUBCUTANEOUS at 11:08

## 2021-05-28 RX ADMIN — CARVEDILOL PHOSPHATE 25 MILLIGRAM(S): 80 CAPSULE, EXTENDED RELEASE ORAL at 05:45

## 2021-05-28 RX ADMIN — VALSARTAN 80 MILLIGRAM(S): 80 TABLET ORAL at 05:45

## 2021-05-28 RX ADMIN — PANTOPRAZOLE SODIUM 40 MILLIGRAM(S): 20 TABLET, DELAYED RELEASE ORAL at 05:45

## 2021-05-28 NOTE — DISCHARGE NOTE PROVIDER - NSDCCPCAREPLAN_GEN_ALL_CORE_FT
PRINCIPAL DISCHARGE DIAGNOSIS  Diagnosis: Anemia  Assessment and Plan of Treatment: due to myelodysplastic syndrome  you got totla 4 units of blood during this admission. occult blood was positive from your stool. Since you have hemorrhoids, prevent constipation  follow up with Dr. garner as scheduled      SECONDARY DISCHARGE DIAGNOSES  Diagnosis: Prophylactic measure  Assessment and Plan of Treatment: Your COVID PCR is positive since March 2021 however, your COVID antibody is positive also. Likely due to your low immunie system from    You got 1 dose of plasma treatment during this admission   If you have any severe SOB or COVID replated symptoms call your PMD and follow the instruction.

## 2021-05-28 NOTE — PROGRESS NOTE ADULT - SUBJECTIVE AND OBJECTIVE BOX
MEDICAL ATTENDING NOTE  Patient is a 75y old  Female who presents with a chief complaint of anemia (28 May 2021 09:06)      HPI:  Patient is 75 yr F with PMH of MDS, HTN, right sided Carotid artery 70% stenosis, anemia induced HF in 2020, chronic anemia presents to the ED for low Hemoglobin. Patient says she has been feeling weak and this morning was feeling very dizzy. Her BP was very low when she checked 84/50. Reports taking her bp medication this am. Reports her hemoglobin was 7 on friday. Patient denies any hematuria, blood in stools or any other bleeding. She endorses having normal appetite no fever, chills, headache, other complaints.  (25 May 2021 14:29)      INTERVAL HPI/OVERNIGHT EVENTS: no new complaints.  Feeling better.     MEDICATIONS  (STANDING):  atorvastatin 80 milliGRAM(s) Oral at bedtime  carvedilol 25 milliGRAM(s) Oral every 12 hours  melatonin 3 milliGRAM(s) Oral at bedtime  pantoprazole  Injectable 40 milliGRAM(s) IV Push every 12 hours  valsartan 80 milliGRAM(s) Oral daily    MEDICATIONS  (PRN):      __________________________________________________  REVIEW OF SYSTEMS:    CONSTITUTIONAL: No fever,   EYES: no acute visual disturbances  NECK: No pain or stiffness  RESPIRATORY: No cough; No shortness of breath  CARDIOVASCULAR: No chest pain, no palpitations  GASTROINTESTINAL: No pain. No nausea or vomiting; No diarrhea   NEUROLOGICAL: No headache or numbness, no tremors  MUSCULOSKELETAL: No joint pain, no muscle pain  GENITOURINARY: no dysuria, no frequency, no hesitancy  PSYCHIATRY: no depression , no anxiety  ALL OTHER  ROS negative        Vital Signs Last 24 Hrs  T(C): 36.7 (28 May 2021 11:50), Max: 36.7 (27 May 2021 20:59)  T(F): 98.1 (28 May 2021 11:50), Max: 98.1 (27 May 2021 20:59)  HR: 62 (28 May 2021 11:51) (62 - 75)  BP: 148/53 (28 May 2021 11:51) (104/34 - 184/50)  BP(mean): --  RR: 16 (28 May 2021 11:50) (16 - 18)  SpO2: 95% (28 May 2021 11:50) (95% - 98%)    ________________________________________________  PHYSICAL EXAM:  GENERAL: NAD, alert, ambulatory  HEENT: Normocephalic;  conjunctivae and sclerae clear; moist mucous membranes;   NECK : supple  CHEST/LUNG: Clear to auscultation bilaterally with good air entry   HEART: S1 S2  regular; no murmurs, gallops or rubs  ABDOMEN: Soft, Nontender, Nondistended; Bowel sounds present  EXTREMITIES: no cyanosis; no edema; no calf tenderness  SKIN: warm and dry; no rash  NERVOUS SYSTEM:  Awake and alert; Oriented  to place, person and time ; no new deficits    _________________________________________________  LABS:                        8.5    4.78  )-----------( 13       ( 28 May 2021 06:41 )             26.4     05-27    140  |  107  |  27<H>  ----------------------------<  92  4.0   |  25  |  0.74    Ca    7.6<L>      27 May 2021 06:06          CAPILLARY BLOOD GLUCOSE

## 2021-05-28 NOTE — DISCHARGE NOTE PROVIDER - NSDCMRMEDTOKEN_GEN_ALL_CORE_FT
carvedilol 25 mg oral tablet: 1 tab(s) orally 2 times a day  Lipitor 80 mg oral tablet: 1 tab(s) orally once a day  pantoprazole 40 mg oral delayed release tablet: 1 tab(s) orally once a day (before a meal)  valsartan 80 mg oral tablet: 1 tab(s) orally once a day  Vitamin D3 5000 intl units (125 mcg) oral tablet: orally once a day

## 2021-05-28 NOTE — PROGRESS NOTE ADULT - ATTENDING COMMENTS
Patient was interviewed and examined and discussed with JOY Muñoz and with Hematology.     She is anxious, but feels better.                            8.5    4.78  )-----------( 13       ( 28 May 2021 06:41 )             26.4   Lactate Dehydrogenase, Serum: 218 U/L (05.27.21 @ 09:35)       IMP:  MDS with poor marrow response.  No hemolysis.  Stool is heme positive, but platelet count is 13 K, and she has no melena or hematochezia.           s/p one unit of COVID convalescent plasma to counter COVID BM suppression.   Plan:  Patient is clinically stable despite low platelet count.  Will discharge home today.  f/u Dr. Mosley.
Patient was examined and  discussed with JOY Joe and with Hematology.     She is feeling better today.   Vital Signs Last 24 Hrs  T(C): 37.2 (26 May 2021 12:10), Max: 37.4 (25 May 2021 19:03)  T(F): 98.9 (26 May 2021 12:10), Max: 99.4 (25 May 2021 19:03)  HR: 77 (26 May 2021 12:10) (70 - 105)  BP: 154/49 (26 May 2021 12:10) (102/49 - 172/63)  BP(mean): --  RR: 16 (26 May 2021 12:10) (15 - 18)  SpO2: 97% (26 May 2021 12:10) (97% - 100%)  Lungs, clear  Cor, II/VI systolic murmur  Abdomen, soft  Neurological, intact                        7.7    8.43  )-----------( 17       ( 26 May 2021 08:21 )             23.5   05-26    138  |  108  |  25<H>  ----------------------------<  91  4.0   |  27  |  0.72    Ca    8.1<L>      26 May 2021 08:21    TPro  5.4<L>  /  Alb  2.6<L>  /  TBili  0.6  /  DBili  x   /  AST  17  /  ALT  17  /  AlkPhos  48  05-26    COVID-19 PCR: Detected: EUA/IVD COVID-19 Donnie Domain Antibody Result: >250.00: Roche ECLIA Total AB (CHRISTIANO)     Reticulocyte Percent: 4.5 % (05.25.21 @ 11:21) Haptoglobin, Serum: 118 mg/dL (05.26.21 @ 02:43) Lactate Dehydrogenase, Serum: 224 U/L (05.25.21 @ 11:21)    IMP:  s/p transfusion for symptomatic anemia with hx MDS.  No evidence of hemolysis.  No evidence of active bleeding.           thrombocytopenia--large platelets seen on smear          leukopenia, improving.           hypertension, needs better control          COVID antigen and antibody both positive--no need for isolation, but COVID may be suppressing BM in an additive fashion to MDS.  Plan: follow H/H.          observe for evidence of bleeding          resume antihypertensives          Rx convalescent plasma, as suggested by Hematology.
Patient was interviewed and examined and discussed with JOY Muñoz and with Hematology.     She is anxious about her  and her daughter, and depressed that she hasn't taken care of herself.   I have reassured her that her medical condition is not a result of her self-neglect.  She declines to accept antidepressant (eg mirtazapine) at this time.                           7.1    6.25  )-----------( 16       ( 27 May 2021 09:35 )             22.9       IMP:  MDS with poor marrow response.  No hemolysis.  Stool is heme positive, but platelet count is 18 K, and she has no melena or hematochezia.   Plan:  Repeat CBC in AM.  Tx an additional unit, if Hb < 7.0.            If patient remains stable, will discharge home.

## 2021-05-28 NOTE — DISCHARGE NOTE NURSING/CASE MANAGEMENT/SOCIAL WORK - PATIENT PORTAL LINK FT
You can access the FollowMyHealth Patient Portal offered by [facility] by registering at the following website: [email or web address]. By joining [employer]’s FollowMyHealth portal, you will also be able to view your health information using other applications (apps) compatible with our system.

## 2021-05-28 NOTE — PROGRESS NOTE ADULT - ASSESSMENT
· Assessment	  73 year old lady with pancytopenia from MDS on chemo, decitabine.  she felt very weak and came to ER.  Hb is 4.5.  She has COVID 2 m ago but still tested positive for it.  no bleeding noticed.    1. severe anemia, she has anemia from MDS  on decitabine  the H/H dropped very fast  will r/o hemolysis  f/u on haptoglobin  check direct dayana  all are neg  will give procrit today    2 persistent COVID+  because of her underlying disease, she probably not able to mount immunity against covid  had 1 u covid plasma.    3. Hb dropped to 6.4 again from 7.7  will repeat CBC. LDH, haptoglobin  if it remains low, will transfuse  direct webb neg.  LDH and haptoglobin normal at admission    4 thrombocytopenia  no bleeding  will watch  she will call me if she has any bleeding.

## 2021-05-28 NOTE — PROGRESS NOTE ADULT - SUBJECTIVE AND OBJECTIVE BOX
doing well  Hb pt o 8.4 after transfusion  no gross bleeding    MEDICATIONS  (STANDING):  atorvastatin 80 milliGRAM(s) Oral at bedtime  carvedilol 25 milliGRAM(s) Oral every 12 hours  epoetin brad-epbx (RETACRIT) Injectable 25269 Unit(s) SubCutaneous once  melatonin 3 milliGRAM(s) Oral at bedtime  pantoprazole  Injectable 40 milliGRAM(s) IV Push every 12 hours  valsartan 80 milliGRAM(s) Oral daily    MEDICATIONS  (PRN):      Allergies    penicillin (Hives)    Intolerances        Vital Signs Last 24 Hrs  T(C): 36.4 (28 May 2021 05:01), Max: 36.9 (27 May 2021 12:00)  T(F): 97.6 (28 May 2021 05:01), Max: 98.4 (27 May 2021 12:00)  HR: 75 (28 May 2021 05:01) (66 - 75)  BP: 168/65 (28 May 2021 05:01) (103/34 - 168/65)  BP(mean): 80 (27 May 2021 14:05) (69 - 80)  RR: 18 (28 May 2021 05:01) (14 - 18)  SpO2: 97% (28 May 2021 05:01) (97% - 100%)    PHYSICAL EXAM  General: adult in NAD  HEENT: clear oropharynx, anicteric sclera, pink conjunctiva  Neck: supple  CV: normal S1/S2 with no murmur rubs or gallops  Lungs: positive air movement b/l ant lungs,clear to auscultation, no wheezes, no rales  Abdomen: soft non-tender non-distended, no hepatosplenomegaly  Ext: no clubbing cyanosis or edema  Skin: no rashes and no petechiae  Neuro: alert and oriented X 4, no focal deficits  LABS:                          8.5    4.78  )-----------( 13       ( 28 May 2021 06:41 )             26.4         Mean Cell Volume : 95.3 fl  Mean Cell Hemoglobin : 30.7 pg  Mean Cell Hemoglobin Concentration : 32.2 gm/dL  Auto Neutrophil # : x  Auto Lymphocyte # : x  Auto Monocyte # : x  Auto Eosinophil # : x  Auto Basophil # : x  Auto Neutrophil % : x  Auto Lymphocyte % : x  Auto Monocyte % : x  Auto Eosinophil % : x  Auto Basophil % : x    Serial CBC  Hematocrit 26.4  Hemoglobin 8.5  Plat 13  RBC 2.77  WBC 4.78  Serial CBC  Hematocrit 22.9  Hemoglobin 7.1  Plat 16  RBC 2.39  WBC 6.25  Serial CBC  Hematocrit 20.4  Hemoglobin 6.4  Plat 14  RBC 2.15  WBC 5.93  Serial CBC  Hematocrit 23.5  Hemoglobin 7.7  Plat 17  RBC 2.58  WBC 8.43  Serial CBC  Hematocrit 24.2  Hemoglobin 8.0  Plat 18  RBC 2.67  WBC 10.08  Serial CBC  Hematocrit 14.7  Hemoglobin 4.3  Plat 24  RBC 1.47  WBC 9.34    05-27    140  |  107  |  27<H>  ----------------------------<  92  4.0   |  25  |  0.74    Ca    7.6<L>      27 May 2021 06:06            Folate, Serum: 13.2 ng/mL (05-26 @ 13:44)  Vitamin B12, Serum: 323 pg/mL (05-26 @ 13:44)  Ferritin, Serum: 677 ng/mL (05-25 @ 21:25)  Iron - Total Binding Capacity.: 243 ug/dL (05-25 @ 14:22)  Reticulocyte Percent: 4.5 % (05-25 @ 11:21)            BLOOD SMEAR INTERPRETATION:       RADIOLOGY & ADDITIONAL STUDIES:

## 2021-05-28 NOTE — DISCHARGE NOTE PROVIDER - CARE PROVIDER_API CALL
Amarilys Mosley  INTERNAL MEDICINE  [address]  [place] [id number]  Phone: [phone]  Fax: [phone]  Follow Up Time: 1 week    Vargas Holbrook  Internal Medicine  [address] [place] [address]  Phone: [phone]  Fax: [phone]  Follow Up Time: 2 weeks

## 2021-05-28 NOTE — PROGRESS NOTE ADULT - PROBLEM SELECTOR PLAN 2
Pt has hx of myelodysplastic syndrome and chronic anemia  following Dr. Mosley

## 2021-05-28 NOTE — DISCHARGE NOTE PROVIDER - HOSPITAL COURSE
Patient is 75 yr F with PMH of MDS, chronic anemia, anemia induced HF, HTN,  right sided Carotid artery 70% stenosis. Presents to the ED with complaints of SOB found with a hemoglobin of 4.3, and platelets of 18k. Admitted to medicine for symptomatic anemia and hemolytic work up. S/P 4U PRBCs during this admission, Hg improved to 8.5, platelets unchanged.  Direct webb neg.  LDH and haptoglobin normal at admission, will f/u with Dr. oMsley.  Also, pt was noted with persistently positive COVID PCR since March. S/P convalescent plasma as recommended  by heme onc Dr Mosley.     Hemodynamically stabilized for discharge, discussed with attending and decision was made for discharge home with out=pt f/u Heme/onc.

## 2021-05-28 NOTE — DISCHARGE NOTE PROVIDER - PROVIDER TOKENS
PROVIDER:[TOKEN:[[id number]],FOLLOWUP:[1 week]],PROVIDER:[TOKEN:[[id number]],FOLLOWUP:[2 weeks]]

## 2021-05-28 NOTE — DISCHARGE NOTE NURSING/CASE MANAGEMENT/SOCIAL WORK - NSDCVIVACCINE_GEN_ALL_CORE_FT
[facility]  Phone: [phone]  Right Fax: [phone]  [facility] [place] [facility] By: SASCHA Mohamud RN        Date: 10/9/17    Patient Name: Tanvir Rangel  Surgery Date: 10/18/2017    CSN: [id number]  Medical Record: [facility] No Vaccines Administered.

## 2021-05-28 NOTE — PROGRESS NOTE ADULT - PROBLEM SELECTOR PLAN 1
p/w 4.3 hb,  No active bleeding   Likely chronic anemia and myelodysplastic  mcv 100  iron panel showed anemia of chronic disease  no evidenced of hemolysis based on  schistocytes, haptoglobin, ldh  s/p 4 prbc to date ( last dose 5/27)   trend CBC  oncologist Dr. Mosley has seen her today and has recommended discharge.

## 2021-05-29 ENCOUNTER — TRANSCRIPTION ENCOUNTER (OUTPATIENT)
Age: 76
End: 2021-05-29

## 2021-06-12 ENCOUNTER — INPATIENT (INPATIENT)
Facility: HOSPITAL | Age: 76
LOS: 0 days | Discharge: HOME | DRG: 812 | End: 2021-06-13
Attending: STUDENT IN AN ORGANIZED HEALTH CARE EDUCATION/TRAINING PROGRAM | Admitting: STUDENT IN AN ORGANIZED HEALTH CARE EDUCATION/TRAINING PROGRAM
Payer: MEDICARE

## 2021-06-12 VITALS
DIASTOLIC BLOOD PRESSURE: 58 MMHG | TEMPERATURE: 98 F | WEIGHT: 128.09 LBS | HEART RATE: 73 BPM | SYSTOLIC BLOOD PRESSURE: 96 MMHG | RESPIRATION RATE: 16 BRPM | OXYGEN SATURATION: 98 % | HEIGHT: 65 IN

## 2021-06-12 DIAGNOSIS — D61.818 OTHER PANCYTOPENIA: ICD-10-CM

## 2021-06-12 DIAGNOSIS — D64.9 ANEMIA, UNSPECIFIED: ICD-10-CM

## 2021-06-12 DIAGNOSIS — I10 ESSENTIAL (PRIMARY) HYPERTENSION: ICD-10-CM

## 2021-06-12 DIAGNOSIS — D46.9 MYELODYSPLASTIC SYNDROME, UNSPECIFIED: ICD-10-CM

## 2021-06-12 DIAGNOSIS — D50.0 IRON DEFICIENCY ANEMIA SECONDARY TO BLOOD LOSS (CHRONIC): ICD-10-CM

## 2021-06-12 DIAGNOSIS — Z29.9 ENCOUNTER FOR PROPHYLACTIC MEASURES, UNSPECIFIED: ICD-10-CM

## 2021-06-12 LAB
ALBUMIN SERPL ELPH-MCNC: 2.9 G/DL — LOW (ref 3.5–5)
ALP SERPL-CCNC: 56 U/L — SIGNIFICANT CHANGE UP (ref 40–120)
ALT FLD-CCNC: 20 U/L DA — SIGNIFICANT CHANGE UP (ref 10–60)
ANION GAP SERPL CALC-SCNC: 10 MMOL/L — SIGNIFICANT CHANGE UP (ref 5–17)
ANISOCYTOSIS BLD QL: SIGNIFICANT CHANGE UP
APTT BLD: 29.1 SEC — SIGNIFICANT CHANGE UP (ref 27.5–35.5)
AST SERPL-CCNC: 21 U/L — SIGNIFICANT CHANGE UP (ref 10–40)
BASOPHILS # BLD AUTO: 0.08 K/UL — SIGNIFICANT CHANGE UP (ref 0–0.2)
BASOPHILS NFR BLD AUTO: 1 % — SIGNIFICANT CHANGE UP (ref 0–2)
BILIRUB SERPL-MCNC: 0.9 MG/DL — SIGNIFICANT CHANGE UP (ref 0.2–1.2)
BLD GP AB SCN SERPL QL: SIGNIFICANT CHANGE UP
BUN SERPL-MCNC: 18 MG/DL — SIGNIFICANT CHANGE UP (ref 7–18)
CALCIUM SERPL-MCNC: 8.4 MG/DL — SIGNIFICANT CHANGE UP (ref 8.4–10.5)
CHLORIDE SERPL-SCNC: 105 MMOL/L — SIGNIFICANT CHANGE UP (ref 96–108)
CO2 SERPL-SCNC: 26 MMOL/L — SIGNIFICANT CHANGE UP (ref 22–31)
CREAT SERPL-MCNC: 1.05 MG/DL — SIGNIFICANT CHANGE UP (ref 0.5–1.3)
DACRYOCYTES BLD QL SMEAR: SLIGHT — SIGNIFICANT CHANGE UP
ELLIPTOCYTES BLD QL SMEAR: SLIGHT — SIGNIFICANT CHANGE UP
EOSINOPHIL # BLD AUTO: 0 K/UL — SIGNIFICANT CHANGE UP (ref 0–0.5)
EOSINOPHIL NFR BLD AUTO: 0 % — SIGNIFICANT CHANGE UP (ref 0–6)
FERRITIN SERPL-MCNC: 179 NG/ML — HIGH (ref 15–150)
GIANT PLATELETS BLD QL SMEAR: PRESENT — SIGNIFICANT CHANGE UP
GLUCOSE SERPL-MCNC: 156 MG/DL — HIGH (ref 70–99)
HCT VFR BLD CALC: 21.5 % — LOW (ref 34.5–45)
HGB BLD-MCNC: 6.2 G/DL — CRITICAL LOW (ref 11.5–15.5)
HYPOCHROMIA BLD QL: SLIGHT — SIGNIFICANT CHANGE UP
INR BLD: 1.04 RATIO — SIGNIFICANT CHANGE UP (ref 0.88–1.16)
IRON SATN MFR SERPL: 151 UG/DL — HIGH (ref 40–150)
IRON SATN MFR SERPL: 52 % — HIGH (ref 15–50)
LG PLATELETS BLD QL AUTO: SIGNIFICANT CHANGE UP
LYMPHOCYTES # BLD AUTO: 2.34 K/UL — SIGNIFICANT CHANGE UP (ref 1–3.3)
LYMPHOCYTES # BLD AUTO: 31 % — SIGNIFICANT CHANGE UP (ref 13–44)
MACROCYTES BLD QL: SIGNIFICANT CHANGE UP
MAGNESIUM SERPL-MCNC: 2.1 MG/DL — SIGNIFICANT CHANGE UP (ref 1.6–2.6)
MANUAL SMEAR VERIFICATION: SIGNIFICANT CHANGE UP
MCHC RBC-ENTMCNC: 28.8 GM/DL — LOW (ref 32–36)
MCHC RBC-ENTMCNC: 30.2 PG — SIGNIFICANT CHANGE UP (ref 27–34)
MCV RBC AUTO: 104.9 FL — HIGH (ref 80–100)
MICROCYTES BLD QL: SLIGHT — SIGNIFICANT CHANGE UP
MONOCYTES # BLD AUTO: 0.08 K/UL — SIGNIFICANT CHANGE UP (ref 0–0.9)
MONOCYTES NFR BLD AUTO: 1 % — LOW (ref 2–14)
NEUTROPHILS # BLD AUTO: 5.07 K/UL — SIGNIFICANT CHANGE UP (ref 1.8–7.4)
NEUTROPHILS NFR BLD AUTO: 67 % — SIGNIFICANT CHANGE UP (ref 43–77)
NRBC # BLD: 3 /100 — HIGH (ref 0–0)
PLAT MORPH BLD: NORMAL — SIGNIFICANT CHANGE UP
PLATELET # BLD AUTO: 18 K/UL — CRITICAL LOW (ref 150–400)
PLATELET COUNT - ESTIMATE: ABNORMAL
POIKILOCYTOSIS BLD QL AUTO: SLIGHT — SIGNIFICANT CHANGE UP
POLYCHROMASIA BLD QL SMEAR: SIGNIFICANT CHANGE UP
POTASSIUM SERPL-MCNC: 3.8 MMOL/L — SIGNIFICANT CHANGE UP (ref 3.5–5.3)
POTASSIUM SERPL-SCNC: 3.8 MMOL/L — SIGNIFICANT CHANGE UP (ref 3.5–5.3)
PROT SERPL-MCNC: 5.7 G/DL — LOW (ref 6–8.3)
PROTHROM AB SERPL-ACNC: 12.3 SEC — SIGNIFICANT CHANGE UP (ref 10.6–13.6)
RBC # BLD: 2.05 M/UL — LOW (ref 3.8–5.2)
RBC # FLD: 19.3 % — HIGH (ref 10.3–14.5)
RBC BLD AUTO: ABNORMAL
SARS-COV-2 RNA SPEC QL NAA+PROBE: SIGNIFICANT CHANGE UP
SCHISTOCYTES BLD QL AUTO: SLIGHT — SIGNIFICANT CHANGE UP
SODIUM SERPL-SCNC: 141 MMOL/L — SIGNIFICANT CHANGE UP (ref 135–145)
TIBC SERPL-MCNC: 289 UG/DL — SIGNIFICANT CHANGE UP (ref 250–450)
UIBC SERPL-MCNC: 138 UG/DL — SIGNIFICANT CHANGE UP (ref 110–370)
WBC # BLD: 7.56 K/UL — SIGNIFICANT CHANGE UP (ref 3.8–10.5)
WBC # FLD AUTO: 7.56 K/UL — SIGNIFICANT CHANGE UP (ref 3.8–10.5)

## 2021-06-12 PROCEDURE — 99291 CRITICAL CARE FIRST HOUR: CPT

## 2021-06-12 PROCEDURE — 93010 ELECTROCARDIOGRAM REPORT: CPT

## 2021-06-12 PROCEDURE — 99223 1ST HOSP IP/OBS HIGH 75: CPT | Mod: GC

## 2021-06-12 PROCEDURE — 71045 X-RAY EXAM CHEST 1 VIEW: CPT | Mod: 26

## 2021-06-12 RX ORDER — IMMUNE GLOBULIN (HUMAN) 10 G/100ML
2280 INJECTION INTRAVENOUS; SUBCUTANEOUS ONCE
Refills: 0 | Status: DISCONTINUED | OUTPATIENT
Start: 2021-06-12 | End: 2021-06-12

## 2021-06-12 RX ORDER — ATORVASTATIN CALCIUM 80 MG/1
80 TABLET, FILM COATED ORAL AT BEDTIME
Refills: 0 | Status: DISCONTINUED | OUTPATIENT
Start: 2021-06-12 | End: 2021-06-13

## 2021-06-12 RX ORDER — IMMUNE GLOBULIN (HUMAN) 10 G/100ML
40 INJECTION INTRAVENOUS; SUBCUTANEOUS ONCE
Refills: 0 | Status: COMPLETED | OUTPATIENT
Start: 2021-06-12 | End: 2021-06-12

## 2021-06-12 RX ORDER — PANTOPRAZOLE SODIUM 20 MG/1
40 TABLET, DELAYED RELEASE ORAL
Refills: 0 | Status: DISCONTINUED | OUTPATIENT
Start: 2021-06-12 | End: 2021-06-13

## 2021-06-12 RX ORDER — ACETAMINOPHEN 500 MG
650 TABLET ORAL EVERY 6 HOURS
Refills: 0 | Status: DISCONTINUED | OUTPATIENT
Start: 2021-06-12 | End: 2021-06-13

## 2021-06-12 RX ORDER — CHOLECALCIFEROL (VITAMIN D3) 125 MCG
5000 CAPSULE ORAL DAILY
Refills: 0 | Status: DISCONTINUED | OUTPATIENT
Start: 2021-06-12 | End: 2021-06-13

## 2021-06-12 RX ORDER — CARVEDILOL PHOSPHATE 80 MG/1
12.5 CAPSULE, EXTENDED RELEASE ORAL EVERY 12 HOURS
Refills: 0 | Status: DISCONTINUED | OUTPATIENT
Start: 2021-06-12 | End: 2021-06-13

## 2021-06-12 RX ADMIN — Medication 650 MILLIGRAM(S): at 18:43

## 2021-06-12 RX ADMIN — Medication 650 MILLIGRAM(S): at 17:50

## 2021-06-12 RX ADMIN — ATORVASTATIN CALCIUM 80 MILLIGRAM(S): 80 TABLET, FILM COATED ORAL at 22:02

## 2021-06-12 RX ADMIN — IMMUNE GLOBULIN (HUMAN) 133.33 GRAM(S): 10 INJECTION INTRAVENOUS; SUBCUTANEOUS at 12:48

## 2021-06-12 NOTE — H&P ADULT - NSHPPHYSICALEXAM_GEN_ALL_CORE
Vital Signs Last 24 Hrs  T(C): 37.2 (12 Jun 2021 12:30), Max: 37.2 (12 Jun 2021 12:30)  T(F): 98.9 (12 Jun 2021 12:30), Max: 98.9 (12 Jun 2021 12:30)  HR: 60 (12 Jun 2021 12:30) (60 - 73)  BP: 130/68 (12 Jun 2021 12:30) (96/58 - 130/68)  BP(mean): --  RR: 17 (12 Jun 2021 12:30) (16 - 17)  SpO2: 97% (12 Jun 2021 12:30) (97% - 98%)    GENERAL: NAD, speaks in full sentences, no signs of respiratory distress  HEAD:  Atraumatic, Normocephalic  EYES: EOMI, PERRLA, conjunctiva and sclera clear  NECK: Supple, No JVD  CHEST/LUNG: Clear to auscultation bilaterally; No wheeze; No crackles; No accessory muscles used  HEART: Regular rate and rhythm; No murmurs;   ABDOMEN: Soft, Nontender, Nondistended; Bowel sounds present; No guarding  EXTREMITIES:  2+ Peripheral Pulses, No cyanosis or edema  PSYCH:  Normal Affect  NEUROLOGY: non-focal, AAO X 3. Strength is 5/5. no sensory loss.  SKIN: No rashes or lesions Vital Signs Last 24 Hrs  T(C): 37.2 (12 Jun 2021 12:30), Max: 37.2 (12 Jun 2021 12:30)  T(F): 98.9 (12 Jun 2021 12:30), Max: 98.9 (12 Jun 2021 12:30)  HR: 60 (12 Jun 2021 12:30) (60 - 73)  BP: 130/68 (12 Jun 2021 12:30) (96/58 - 130/68)  BP(mean): --  RR: 17 (12 Jun 2021 12:30) (16 - 17)  SpO2: 97% (12 Jun 2021 12:30) (97% - 98%)    GENERAL: NAD, Pale looking female  HEAD:  Atraumatic, Normocephalic  EYES: EOMI, PERRLA, conjunctiva and sclera clear  NECK: Supple, No JVD  CHEST/LUNG: Clear to auscultation bilaterally; No wheeze; No crackles; No accessory muscles used  HEART: Regular rate and rhythm; No murmurs;   ABDOMEN: Soft, Nontender, Nondistended; Bowel sounds present; No guarding  EXTREMITIES:  2+ Peripheral Pulses, No cyanosis or edema  PSYCH:  Normal Affect  NEUROLOGY: non-focal, AAO X 3. Strength is 5/5. no sensory loss.  SKIN: No rashes or lesions

## 2021-06-12 NOTE — H&P ADULT - PROBLEM SELECTOR PLAN 1
Patient with Pancytopenia. Hb noted to be 6.6  Platelet 18.  Will transfuse two units of blood . Patient has hx of positive antibody, will get immunoglobin before transfusion as per Dr Mosley.   plan for 2 PRBC. follow post transusion cbc.   Iron profile noted , normal TIBC.  normal serum iron. more likely anemia due to chronic disease.  Will monitor platelet count, patient not actively bleeding.

## 2021-06-12 NOTE — H&P ADULT - ATTENDING COMMENTS
Patient is a 75y old  Female who presents with a chief complaint of dizziness. Patient reports she recently had blood transfusion but her Hb continues to drop and was sent to ER for blood transfusion. She feels tired and dizziness with exertion. Denies any active bleeding. Spoke with Dr. Mosley, suspects Ab mediated RBC destruction. Recommends IVIG before blood transfusion.     INTERVAL HPI/OVERNIGHT EVENTS:  T(C): 36.4 (06-12-21 @ 15:52), Max: 37.2 (06-12-21 @ 12:30)  HR: 90 (06-12-21 @ 15:52) (60 - 90)  BP: 109/60 (06-12-21 @ 15:52) (96/58 - 130/68)  RR: 19 (06-12-21 @ 15:52) (16 - 19)  SpO2: 99% (06-12-21 @ 15:52) (97% - 99%)  Wt(kg): --  I&O's Summary      REVIEW OF SYSTEMS: denies fever, chills, SOB, palpitations, chest pain, abdominal pain, nausea, vomiting, diarrhea, constipation  PHYSICAL EXAM:  GENERAL: NAD, well-groomed, well-developed  NERVOUS SYSTEM:  Alert & Oriented X3, Good concentration; Motor Strength 5/5 B/L upper and lower extremities  CHEST/LUNG: Clear to auscultation bilaterally; No rales, rhonchi, wheezing, or rubs  HEART: Regular rate and rhythm; No murmurs, rubs, or gallops  ABDOMEN: Soft, Nontender, Nondistended; Bowel sounds present  EXTREMITIES:  2+ Peripheral Pulses, No clubbing, cyanosis, or edema  SKIN: multiple bruises     LABS:                        6.2    7.56  )-----------( 18       ( 12 Jun 2021 10:40 )             21.5     06-12    141  |  105  |  18  ----------------------------<  156<H>  3.8   |  26  |  1.05    Ca    8.4      12 Jun 2021 10:40  Mg     2.1     06-12    TPro  5.7<L>  /  Alb  2.9<L>  /  TBili  0.9  /  DBili  x   /  AST  21  /  ALT  20  /  AlkPhos  56  06-12    PT/INR - ( 12 Jun 2021 12:32 )   PT: 12.3 sec;   INR: 1.04 ratio         PTT - ( 12 Jun 2021 12:32 )  PTT:29.1 sec    CAPILLARY BLOOD GLUCOSE    A/P:  pancytopenia due to myelodysplastic syndrome- on chemotherapy  Symptomatic anemia   HTN  Right Coronary artery stenosis    Plan:   PBF slight schistocytes, patient will receive 0.7gm/kg IVIG one dose before 2units PRBC   Monitor for any transfusion reactions   F/U post transfusion CBC  Cont Coreg with holding parameter   Bleeding precaution   Have active type and screen  Rest of the management as above

## 2021-06-12 NOTE — ED PROVIDER NOTE - CLINICAL SUMMARY MEDICAL DECISION MAKING FREE TEXT BOX
anemia requiring transfusion per pmd dr garner anemia requiring transfusion per pmd dr garner. he requests ivig prior to prbcs. no source of bleeding ever identified so likely allo immunization. approval obtained from dr tyra elizabeth anemia requiring transfusion per pmd dr garner. he requests ivig prior to prbcs. no source of bleeding ever identified so likely allo immunization. approval obtained from dr trya elizabeth  As interpreted by ED physician, ECG is NSR with normal intervals/axis, no changes in QRS, no ST/T changes.

## 2021-06-12 NOTE — PATIENT PROFILE ADULT - BRAND OF COVID-19 VACCINATION
PRE-OP EVALUATION    Patient Name: Tiny Faye.     Admit Diagnosis: Lumbar radiculopathy [M54.16]    Pre-op Diagnosis: Lumbar radiculopathy [M54.16]    LEFT LUMBAR 4 - LUMBAR 5 FRAGMENTECTOMY, POSSIBLE DISCECTOMY AND ALL INDICATED PROCEDURES pre-op labs reviewed.   Lab Results   Component Value Date    WBC 7.8 04/07/2021    RBC 4.15 (L) 04/07/2021    HGB 12.2 (L) 04/07/2021    HCT 36.4 (L) 04/07/2021    MCV 87.7 04/07/2021    MCH 29.4 04/07/2021    MCHC 33.5 04/07/2021    RDW 12.8 04/07/2021 Clara

## 2021-06-12 NOTE — H&P ADULT - ASSESSMENT
75 yr old Female  with PMH of MDS, chronic anemia, HTN presented to Ed  for low hemoglobin. Patient states that she was feeling weak she went to Dr Mosley s clinic , blood work showed anemia so she was sent to ED for blood transfusion.   Patients states that she was diagnosed with Myelodysplastic syndrome in April after that she had two cycles of chemo each for five days one in April and last in may she is due for her next chemo from 6/14/21.  Patient admitted for severe symptomatic  anemia.

## 2021-06-12 NOTE — ED ADULT NURSE NOTE - OBJECTIVE STATEMENT
patient sent by Dr Mosley for blood transfusion. AS per patient HGB 6, feeling weakness and lightheaded. Patient denies pain/discomfort. No active bleeding noted.

## 2021-06-12 NOTE — H&P ADULT - PROBLEM SELECTOR PLAN 4
Patient on carvedilol and valsartan at home. will hold valsartan.  continue with carvedilol, lower dose with parameter.

## 2021-06-12 NOTE — H&P ADULT - PROBLEM SELECTOR PLAN 5
RISK                                                          Points  [] Previous VTE                                           3  [] Thrombophilia                                        2  [] Lower limb paralysis                              2   [] Current Cancer                                       2   [x] Immobilization > 24 hrs                        1  [] ICU/CCU stay > 24 hours                       1  [x] Age > 60                                                   1    Will hold dvt ppx for severe anemia and thromobocytopenia.

## 2021-06-12 NOTE — ED PROVIDER NOTE - OBJECTIVE STATEMENT
75f hx of leukemia pw anemia. had a couple of days of fatigue and lethargy. went to pmd, was told that her hgb was 6.2 after a blood draw yesterday. sent to the ed for transfusion. has had previous transfusions, no signs of bleeding or dark stool. no fever. ros neg for ha, vision loss, rhinorrhea, cp, sob, rash, numbness, dysuria, back pain.

## 2021-06-12 NOTE — H&P ADULT - HISTORY OF PRESENT ILLNESS
75 yr old Female  with PMH of MDS, chronic anemia, HTN presented to Ed  for low hemoglobin. Patient states that she was feeling weak she went to Dr Mosley s clinic , blood work showed anemia so she was sent to ED for blood transfusion.   Patients states that she was diagnosed with Myelodysplastic syndrome.  75 yr old Female  with PMH of MDS, chronic anemia, HTN presented to Ed  for low hemoglobin. Patient states that she was feeling weak she went to Dr Mosley s clinic , blood work showed anemia so she was sent to ED for blood transfusion.   Patients states that she was diagnosed with Myelodysplastic syndrome in April after that she had two cycles of chemo each for five days one in April and last in may she is due for her next chemo from 6/14/21. Patient denies any shortness of breath, nausea, vomiting, diarrhea bowel or urinary problems. She also added that she has been started on retacrit injections by Dr mosley every week in clinic.

## 2021-06-13 ENCOUNTER — TRANSCRIPTION ENCOUNTER (OUTPATIENT)
Age: 76
End: 2021-06-13

## 2021-06-13 VITALS
HEART RATE: 94 BPM | RESPIRATION RATE: 18 BRPM | SYSTOLIC BLOOD PRESSURE: 134 MMHG | TEMPERATURE: 98 F | OXYGEN SATURATION: 98 % | DIASTOLIC BLOOD PRESSURE: 63 MMHG

## 2021-06-13 LAB
ALBUMIN SERPL ELPH-MCNC: 3 G/DL — LOW (ref 3.5–5)
ALP SERPL-CCNC: 63 U/L — SIGNIFICANT CHANGE UP (ref 40–120)
ALT FLD-CCNC: 22 U/L DA — SIGNIFICANT CHANGE UP (ref 10–60)
ANION GAP SERPL CALC-SCNC: 3 MMOL/L — LOW (ref 5–17)
AST SERPL-CCNC: 22 U/L — SIGNIFICANT CHANGE UP (ref 10–40)
BILIRUB SERPL-MCNC: 1 MG/DL — SIGNIFICANT CHANGE UP (ref 0.2–1.2)
BUN SERPL-MCNC: 20 MG/DL — HIGH (ref 7–18)
CALCIUM SERPL-MCNC: 8.3 MG/DL — LOW (ref 8.4–10.5)
CHLORIDE SERPL-SCNC: 107 MMOL/L — SIGNIFICANT CHANGE UP (ref 96–108)
CO2 SERPL-SCNC: 28 MMOL/L — SIGNIFICANT CHANGE UP (ref 22–31)
COVID-19 SPIKE DOMAIN AB INTERP: POSITIVE
COVID-19 SPIKE DOMAIN ANTIBODY RESULT: >250 U/ML — HIGH
CREAT SERPL-MCNC: 0.92 MG/DL — SIGNIFICANT CHANGE UP (ref 0.5–1.3)
GLUCOSE SERPL-MCNC: 130 MG/DL — HIGH (ref 70–99)
HAPTOGLOB SERPL-MCNC: 108 MG/DL — SIGNIFICANT CHANGE UP (ref 34–200)
HCT VFR BLD CALC: 28.3 % — LOW (ref 34.5–45)
HCT VFR BLD CALC: 30.1 % — LOW (ref 34.5–45)
HCYS SERPL-MCNC: 10 UMOL/L — SIGNIFICANT CHANGE UP
HGB BLD-MCNC: 8.6 G/DL — LOW (ref 11.5–15.5)
HGB BLD-MCNC: 9.4 G/DL — LOW (ref 11.5–15.5)
LDH SERPL L TO P-CCNC: 209 U/L — SIGNIFICANT CHANGE UP (ref 120–225)
MAGNESIUM SERPL-MCNC: 2.4 MG/DL — SIGNIFICANT CHANGE UP (ref 1.6–2.6)
MCHC RBC-ENTMCNC: 29.4 PG — SIGNIFICANT CHANGE UP (ref 27–34)
MCHC RBC-ENTMCNC: 29.9 PG — SIGNIFICANT CHANGE UP (ref 27–34)
MCHC RBC-ENTMCNC: 30.4 GM/DL — LOW (ref 32–36)
MCHC RBC-ENTMCNC: 31.2 GM/DL — LOW (ref 32–36)
MCV RBC AUTO: 95.9 FL — SIGNIFICANT CHANGE UP (ref 80–100)
MCV RBC AUTO: 96.6 FL — SIGNIFICANT CHANGE UP (ref 80–100)
NRBC # BLD: 5 /100 WBCS — HIGH (ref 0–0)
NRBC # BLD: 8 /100 WBCS — HIGH (ref 0–0)
PHOSPHATE SERPL-MCNC: 3.4 MG/DL — SIGNIFICANT CHANGE UP (ref 2.5–4.5)
PLATELET # BLD AUTO: 14 K/UL — CRITICAL LOW (ref 150–400)
PLATELET # BLD AUTO: 15 K/UL — CRITICAL LOW (ref 150–400)
POTASSIUM SERPL-MCNC: 3.9 MMOL/L — SIGNIFICANT CHANGE UP (ref 3.5–5.3)
POTASSIUM SERPL-SCNC: 3.9 MMOL/L — SIGNIFICANT CHANGE UP (ref 3.5–5.3)
PROT SERPL-MCNC: 6.9 G/DL — SIGNIFICANT CHANGE UP (ref 6–8.3)
RBC # BLD: 2.93 M/UL — LOW (ref 3.8–5.2)
RBC # BLD: 3.14 M/UL — LOW (ref 3.8–5.2)
RBC # FLD: 19.1 % — HIGH (ref 10.3–14.5)
RBC # FLD: 20 % — HIGH (ref 10.3–14.5)
SARS-COV-2 IGG+IGM SERPL QL IA: >250 U/ML — HIGH
SARS-COV-2 IGG+IGM SERPL QL IA: POSITIVE
SODIUM SERPL-SCNC: 138 MMOL/L — SIGNIFICANT CHANGE UP (ref 135–145)
WBC # BLD: 4.92 K/UL — SIGNIFICANT CHANGE UP (ref 3.8–10.5)
WBC # BLD: 5.37 K/UL — SIGNIFICANT CHANGE UP (ref 3.8–10.5)
WBC # FLD AUTO: 4.92 K/UL — SIGNIFICANT CHANGE UP (ref 3.8–10.5)
WBC # FLD AUTO: 5.37 K/UL — SIGNIFICANT CHANGE UP (ref 3.8–10.5)

## 2021-06-13 PROCEDURE — 85025 COMPLETE CBC W/AUTO DIFF WBC: CPT

## 2021-06-13 PROCEDURE — 83090 ASSAY OF HOMOCYSTEINE: CPT

## 2021-06-13 PROCEDURE — 85027 COMPLETE CBC AUTOMATED: CPT

## 2021-06-13 PROCEDURE — 99239 HOSP IP/OBS DSCHRG MGMT >30: CPT | Mod: GC

## 2021-06-13 PROCEDURE — 71045 X-RAY EXAM CHEST 1 VIEW: CPT

## 2021-06-13 PROCEDURE — P9040: CPT

## 2021-06-13 PROCEDURE — 86769 SARS-COV-2 COVID-19 ANTIBODY: CPT

## 2021-06-13 PROCEDURE — 83010 ASSAY OF HAPTOGLOBIN QUANT: CPT

## 2021-06-13 PROCEDURE — 86901 BLOOD TYPING SEROLOGIC RH(D): CPT

## 2021-06-13 PROCEDURE — 84100 ASSAY OF PHOSPHORUS: CPT

## 2021-06-13 PROCEDURE — 86900 BLOOD TYPING SEROLOGIC ABO: CPT

## 2021-06-13 PROCEDURE — 36430 TRANSFUSION BLD/BLD COMPNT: CPT

## 2021-06-13 PROCEDURE — 99291 CRITICAL CARE FIRST HOUR: CPT

## 2021-06-13 PROCEDURE — 80053 COMPREHEN METABOLIC PANEL: CPT

## 2021-06-13 PROCEDURE — 36415 COLL VENOUS BLD VENIPUNCTURE: CPT

## 2021-06-13 PROCEDURE — 93005 ELECTROCARDIOGRAM TRACING: CPT

## 2021-06-13 PROCEDURE — 85610 PROTHROMBIN TIME: CPT

## 2021-06-13 PROCEDURE — 87635 SARS-COV-2 COVID-19 AMP PRB: CPT

## 2021-06-13 PROCEDURE — 83550 IRON BINDING TEST: CPT

## 2021-06-13 PROCEDURE — 83540 ASSAY OF IRON: CPT

## 2021-06-13 PROCEDURE — 83615 LACTATE (LD) (LDH) ENZYME: CPT

## 2021-06-13 PROCEDURE — 82728 ASSAY OF FERRITIN: CPT

## 2021-06-13 PROCEDURE — 85730 THROMBOPLASTIN TIME PARTIAL: CPT

## 2021-06-13 PROCEDURE — 83735 ASSAY OF MAGNESIUM: CPT

## 2021-06-13 PROCEDURE — 86923 COMPATIBILITY TEST ELECTRIC: CPT

## 2021-06-13 PROCEDURE — 86850 RBC ANTIBODY SCREEN: CPT

## 2021-06-13 RX ORDER — ATORVASTATIN CALCIUM 80 MG/1
1 TABLET, FILM COATED ORAL
Qty: 0 | Refills: 0 | DISCHARGE

## 2021-06-13 RX ORDER — CHOLECALCIFEROL (VITAMIN D3) 125 MCG
1 CAPSULE ORAL
Qty: 30 | Refills: 0
Start: 2021-06-13 | End: 2021-07-12

## 2021-06-13 RX ORDER — CARVEDILOL PHOSPHATE 80 MG/1
1 CAPSULE, EXTENDED RELEASE ORAL
Qty: 0 | Refills: 0 | DISCHARGE

## 2021-06-13 RX ORDER — CARVEDILOL PHOSPHATE 80 MG/1
25 CAPSULE, EXTENDED RELEASE ORAL EVERY 12 HOURS
Refills: 0 | Status: DISCONTINUED | OUTPATIENT
Start: 2021-06-13 | End: 2021-06-13

## 2021-06-13 RX ORDER — CARVEDILOL PHOSPHATE 80 MG/1
1 CAPSULE, EXTENDED RELEASE ORAL
Qty: 60 | Refills: 0
Start: 2021-06-13 | End: 2021-07-12

## 2021-06-13 RX ORDER — CHOLECALCIFEROL (VITAMIN D3) 125 MCG
0 CAPSULE ORAL
Qty: 0 | Refills: 0 | DISCHARGE

## 2021-06-13 RX ORDER — PANTOPRAZOLE SODIUM 20 MG/1
1 TABLET, DELAYED RELEASE ORAL
Qty: 30 | Refills: 0
Start: 2021-06-13 | End: 2021-07-12

## 2021-06-13 RX ORDER — ATORVASTATIN CALCIUM 80 MG/1
1 TABLET, FILM COATED ORAL
Qty: 30 | Refills: 0
Start: 2021-06-13 | End: 2021-07-12

## 2021-06-13 RX ORDER — VALSARTAN 80 MG/1
1 TABLET ORAL
Qty: 30 | Refills: 0
Start: 2021-06-13 | End: 2021-07-12

## 2021-06-13 RX ORDER — PANTOPRAZOLE SODIUM 20 MG/1
1 TABLET, DELAYED RELEASE ORAL
Qty: 0 | Refills: 0 | DISCHARGE

## 2021-06-13 RX ORDER — VALSARTAN 80 MG/1
80 TABLET ORAL DAILY
Refills: 0 | Status: DISCONTINUED | OUTPATIENT
Start: 2021-06-13 | End: 2021-06-13

## 2021-06-13 RX ORDER — VALSARTAN 80 MG/1
1 TABLET ORAL
Qty: 0 | Refills: 0 | DISCHARGE

## 2021-06-13 RX ADMIN — PANTOPRAZOLE SODIUM 40 MILLIGRAM(S): 20 TABLET, DELAYED RELEASE ORAL at 06:06

## 2021-06-13 RX ADMIN — VALSARTAN 80 MILLIGRAM(S): 80 TABLET ORAL at 09:04

## 2021-06-13 RX ADMIN — Medication 5000 UNIT(S): at 11:22

## 2021-06-13 RX ADMIN — CARVEDILOL PHOSPHATE 12.5 MILLIGRAM(S): 80 CAPSULE, EXTENDED RELEASE ORAL at 06:06

## 2021-06-13 NOTE — PROGRESS NOTE ADULT - SUBJECTIVE AND OBJECTIVE BOX
PGY-1 Progress Note discussed with attending    PAGER #: [[phone]] TILL 5:00 PM  PLEASE CONTACT ON CALL TEAM:  - On Call Team (Please refer to Katharine) FROM 5:00 PM - 8:30PM  - Nightfloat Team FROM 8:30 -7:30 AM    CHIEF COMPLAINT & BRIEF HOSPITAL COURSE:    INTERVAL HPI/OVERNIGHT EVENTS:       REVIEW OF SYSTEMS:  CONSTITUTIONAL: No fever, weight loss, or fatigue  RESPIRATORY: No cough, wheezing, chills or hemoptysis; No shortness of breath  CARDIOVASCULAR: No chest pain, palpitations, dizziness, or leg swelling  GASTROINTESTINAL: No abdominal pain. No nausea, vomiting, or hematemesis; No diarrhea or constipation. No melena or hematochezia.  GENITOURINARY: No dysuria or hematuria, urinary frequency  NEUROLOGICAL: No headaches, memory loss, loss of strength, numbness, or tremors  SKIN: No itching, burning, rashes, or lesions     MEDICATIONS  (STANDING):  atorvastatin 80 milliGRAM(s) Oral at bedtime  carvedilol 25 milliGRAM(s) Oral every 12 hours  cholecalciferol 5000 Unit(s) Oral daily  pantoprazole    Tablet 40 milliGRAM(s) Oral before breakfast  valsartan 80 milliGRAM(s) Oral daily    MEDICATIONS  (PRN):  acetaminophen   Tablet .. 650 milliGRAM(s) Oral every 6 hours PRN Moderate Pain (4 - 6)      Vital Signs Last 24 Hrs  T(C): 36.8 (13 Jun 2021 05:35), Max: 37.2 (12 Jun 2021 12:30)  T(F): 98.2 (13 Jun 2021 05:35), Max: 98.9 (12 Jun 2021 12:30)  HR: 80 (13 Jun 2021 05:35) (60 - 90)  BP: 171/55 (13 Jun 2021 09:00) (105/39 - 175/58)  BP(mean): --  RR: 18 (13 Jun 2021 05:35) (17 - 19)  SpO2: 98% (13 Jun 2021 05:35) (96% - 99%)    PHYSICAL EXAMINATION:  GENERAL: NAD, well built  HEAD:  Atraumatic, Normocephalic  EYES:  conjunctiva and sclera clear  NECK: Supple, No JVD, Normal thyroid  CHEST/LUNG: Clear to auscultation. Clear to percussion bilaterally; No rales, rhonchi, wheezing, or rubs  HEART: Regular rate and rhythm; No murmurs, rubs, or gallops  ABDOMEN: Soft, Nontender, Nondistended; Bowel sounds present  NERVOUS SYSTEM:  Alert & Oriented X3,    EXTREMITIES:  2+ Peripheral Pulses, No clubbing, cyanosis, or edema  SKIN: warm dry                          9.4    5.37  )-----------( 15       ( 13 Jun 2021 08:38 )             30.1     06-13    138  |  107  |  20<H>  ----------------------------<  130<H>  3.9   |  28  |  0.92    Ca    8.3<L>      13 Jun 2021 08:38  Phos  3.4     06-13  Mg     2.4     06-13    TPro  6.9  /  Alb  3.0<L>  /  TBili  1.0  /  DBili  x   /  AST  22  /  ALT  22  /  AlkPhos  63  06-13    LIVER FUNCTIONS - ( 13 Jun 2021 08:38 )  Alb: 3.0 g/dL / Pro: 6.9 g/dL / ALK PHOS: 63 U/L / ALT: 22 U/L DA / AST: 22 U/L / GGT: x               PT/INR - ( 12 Jun 2021 12:32 )   PT: 12.3 sec;   INR: 1.04 ratio         PTT - ( 12 Jun 2021 12:32 )  PTT:29.1 sec    CAPILLARY BLOOD GLUCOSE      RADIOLOGY & ADDITIONAL TESTS:

## 2021-06-13 NOTE — DISCHARGE NOTE PROVIDER - NSDCPNSUBOBJ_GEN_ALL_CORE
Patient is a 75y old  Female who presents with a chief complaint of Symptomatic Anemia (13 Jun 2021 10:07)    Patient was seen and examined at bedside   Denies any complains   Symptoms of dizziness and weakness has improved     INTERVAL HPI/OVERNIGHT EVENTS:  T(C): 36.8 (06-13-21 @ 13:45), Max: 36.9 (06-12-21 @ 17:39)  HR: 94 (06-13-21 @ 13:45) (67 - 94)  BP: 134/63 (06-13-21 @ 13:45) (105/39 - 175/58)  RR: 18 (06-13-21 @ 13:45) (17 - 18)  SpO2: 98% (06-13-21 @ 13:45) (96% - 98%)  Wt(kg): --  I&O's Summary    12 Jun 2021 07:01  -  13 Jun 2021 07:00  --------------------------------------------------------  IN: 700 mL / OUT: 0 mL / NET: 700 mL        REVIEW OF SYSTEMS: denies fever, chills, SOB, palpitations, chest pain, abdominal pain, nausea, vomiting, diarrhea, constipation, dizziness    MEDICATIONS  (STANDING):  atorvastatin 80 milliGRAM(s) Oral at bedtime  carvedilol 25 milliGRAM(s) Oral every 12 hours  cholecalciferol 5000 Unit(s) Oral daily  pantoprazole    Tablet 40 milliGRAM(s) Oral before breakfast  valsartan 80 milliGRAM(s) Oral daily    MEDICATIONS  (PRN):  acetaminophen   Tablet .. 650 milliGRAM(s) Oral every 6 hours PRN Moderate Pain (4 - 6)      PHYSICAL EXAM:  GENERAL: NAD, well-groomed, well-developed  NERVOUS SYSTEM:  Alert & Oriented X3, Good concentration; Motor Strength 5/5 B/L upper and lower extremities  CHEST/LUNG: Clear to auscultation bilaterally; No rales, rhonchi, wheezing, or rubs  HEART: Regular rate and rhythm; ?murmur  ABDOMEN: Soft, Nontender, Nondistended; Bowel sounds present  EXTREMITIES:  2+ Peripheral Pulses, No clubbing, cyanosis, or edema  SKIN: No rashes or lesions    LABS:                        9.4    5.37  )-----------( 15       ( 13 Jun 2021 08:38 )             30.1     138  |  107  |  20<H>  ----------------------------<  130<H>  3.9   |  28  |  0.92    Ca    8.3<L>      13 Jun 2021 08:38  Phos  3.4     06-13  Mg     2.4     06-13    TPro  6.9  /  Alb  3.0<L>  /  TBili  1.0  /  DBili  x   /  AST  22  /  ALT  22  /  AlkPhos  63  06-13    PT/INR - ( 12 Jun 2021 12:32 )   PT: 12.3 sec;   INR: 1.04 ratio         PTT - ( 12 Jun 2021 12:32 )  PTT:29.1 sec    CAPILLARY BLOOD GLUCOSE    A/P:  Pancytopenia due to myelodysplastic syndrome- on chemotherapy  Symptomatic anemia   HTN  Right Coronary artery stenosis    Plan:   Hb responded after 2units PRBC  and IVIG  No transfusion reactions   Cont home meds for BP  Patient will follow up with Dr. Mosley as outpatient to continue chemotherapy

## 2021-06-13 NOTE — DISCHARGE NOTE PROVIDER - HOSPITAL COURSE
75 yr old Female  with PMHx of MDS, chronic anemia, HTN presented to ED  for low hemoglobin. Patient states that she was feeling weak and presented to Dr Mosley's clinic, where blood work showed severe anemia and she was told to come to the ED.   Patients states that she was diagnosed with Myelodysplastic syndrome in April after which she had two cycles of chemo (5 days each; April, May). Her next chemo scheduled for 6/14/21. Patient denies any shortness of breath, nausea, vomiting, diarrhea bowel or urinary problems. She also added that she has been started on retacrit injections by Dr Mosley weekly.   75 yr old Female  with PMHx of MDS, chronic anemia, HTN presented to ED  for low hemoglobin. Patient states that she was feeling weak and presented to Dr Mosley's clinic, where blood work showed severe anemia and she was told to come to the ED. Patients states that she was diagnosed with Myelodysplastic syndrome in April after which she had two cycles of chemo (5 days each; April, May). Her next chemo scheduled for 6/14/21. Patient denies any shortness of breath, nausea, vomiting, diarrhea bowel or urinary problems. She also added that she has been started on retacrit injections by Dr Mosley weekly.   75 yr old Female  with PMHx of MDS, chronic anemia, HTN presented to ED  for low hemoglobin. Patient states that she was feeling weak and presented to Dr Mosley's clinic, where blood work showed severe anemia and she was told to come to the ED. Patients states that she was diagnosed with Myelodysplastic syndrome in April after which she had two cycles of chemo (5 days each; April, May). Her next chemo scheduled for 6/14/21. Patient denies any shortness of breath, nausea, vomiting, diarrhea bowel or urinary problems. She also added that she has been started on retacrit injections by Dr Mosley weekly. In the ED, her vital signs were temp 98.1F, HR 73, BP 98/56 with normal saturations. Some of her home blood pressure medications were held. Hemoglobin was found to be 6.2, platelet 18, and she was transfused 2 units PRBC. She improved to Hb 9.4g/dl in the AM and did not have any signs of bleeding or infection. Home BP meds resumed and BP improved. Given patient's improved clinical status and current hemodynamic stability, decision was made to discharge after discussing with attending physician. Please refer to patient's complete medical chart with documents for a full hospital course, for this is only a brief summary.

## 2021-06-13 NOTE — DISCHARGE NOTE NURSING/CASE MANAGEMENT/SOCIAL WORK - PATIENT PORTAL LINK FT
You can access the FollowMyHealth Patient Portal offered by [facility] by registering at the following website: [email or web address]. By joining [facility]’s FollowMyHealth portal, you will also be able to view your health information using other applications (apps) compatible with our system.

## 2021-06-13 NOTE — DISCHARGE NOTE PROVIDER - NSDCMRMEDTOKEN_GEN_ALL_CORE_FT
carvedilol 25 mg oral tablet: 1 tab(s) orally 2 times a day  Lipitor 80 mg oral tablet: 1 tab(s) orally once a day  Protonix 40 mg oral delayed release tablet: 1 tab(s) orally once a day  valsartan 80 mg oral tablet: 1 tab(s) orally once a day  Vitamin D3 5000 intl units (125 mcg) oral tablet: orally once a day   carvedilol 25 mg oral tablet: 1 tab(s) orally 2 times a day  Lipitor 80 mg oral tablet: 1 tab(s) orally once a day  Protonix 40 mg oral delayed release tablet: 1 tab(s) orally once a day  valsartan 80 mg oral tablet: 1 tab(s) orally once a day  Vitamin D3 5000 intl units (125 mcg) oral tablet: 1 cap(s) orally once a day

## 2021-06-13 NOTE — DISCHARGE NOTE PROVIDER - NPI NUMBER (FOR SYSADMIN USE ONLY) :
Post-Care Instructions: I reviewed with the patient in detail post-care instructions. Patient is to wear sunprotection, and avoid picking at any of the treated lesions. Pt may apply Vaseline to crusted or scabbing areas. Render Note In Bullet Format When Appropriate: No Duration Of Freeze Thaw-Cycle (Seconds): 0 Consent: The patient's consent was obtained including but not limited to risks of crusting, scabbing, blistering, scarring, darker or lighter pigmentary change, recurrence, incomplete removal and infection. Detail Level: Detailed [[id number]]

## 2021-06-13 NOTE — DISCHARGE NOTE PROVIDER - CARE PROVIDER_API CALL
Amarilys Mosley  INTERNAL MEDICINE  [address] [place] [address]  Phone: [phone]  Fax: [phone]  Established Patient  Scheduled Appointment: 06/14/2021

## 2021-06-13 NOTE — DISCHARGE NOTE PROVIDER - NSDCCPCAREPLAN_GEN_ALL_CORE_FT
PRINCIPAL DISCHARGE DIAGNOSIS  Diagnosis: Symptomatic anemia  Assessment and Plan of Treatment:       SECONDARY DISCHARGE DIAGNOSES  Diagnosis: Pancytopenia  Assessment and Plan of Treatment: You are found to have low counts of hemoglobin, platelets, and low normal white count. This is likely secondary to MDS and ongoing chemotherapy. Please follow up with your heme onc Dr. Mosley for further labwork and treatment.    Diagnosis: Myelodysplastic syndrome  Assessment and Plan of Treatment: You have a history of MDS diagnosed one year ago, and you are currently recieving chemotherapy with Heme-Onc Dr. Mosley. You are advised to continue follow up with Dr. Mosley, and report if you experience any bleeding, infections, extreme nausea/vomiting or falls/ fractures. You have previously recieved 2 blood transfusions for drops in blood counts related to your treatment.    Diagnosis: Hypertension  Assessment and Plan of Treatment: You have high blood pressure, for which you have been previously on medication including . It is important to continue to take your medications on time,  monitor your blood pressure at home, keep a log of your home readings and follow up with your Primary Care Physician. As per AHA/ACC guidelines, it is important to adhere to a DASH Diet of fresh fruits, vegetables, lean meats such as poultry and fish, and whole wheat carbs. 30 minutes of aerobic exercise per day 3-4 times a week, reducing salt intake <1.5g/day, and cutting down on highly processed foods are also shown to reduce BP. Uncontrolled BP may result in organ damage to your eyes, brain, heart, and kidneys by causing strokes, heart attacks, kidney failure, and bleeds in your eye.       PRINCIPAL DISCHARGE DIAGNOSIS  Diagnosis: Symptomatic anemia  Assessment and Plan of Treatment: You were found to have a low hemoglobin on admission, and further anemia studies shoed a high total iron, iron saturation, and normal TIBC. This means your anemia is most likely secondary to your myelodysplastic syndrome and ongoing chemotehrapy. You recieved 2 units PRBC and your hemoglobin on discharge was 9.4g/dl. You were not found to be actively bleeding, and your bloodwork did not show any abnormalities in clotting.      SECONDARY DISCHARGE DIAGNOSES  Diagnosis: Pancytopenia  Assessment and Plan of Treatment: You are found to have low counts of hemoglobin, platelets, and low normal white count. This is likely secondary to MDS and ongoing chemotherapy. Please follow up with your heme onc Dr. Mosley for further labwork and treatment.    Diagnosis: Myelodysplastic syndrome  Assessment and Plan of Treatment: You have a history of MDS diagnosed one year ago, and you are currently recieving chemotherapy with Heme-Onc Dr. Mosley. You are advised to continue follow up with Dr. Mosley, and report if you experience any bleeding, infections, extreme nausea/vomiting or falls/ fractures. You have previously recieved 2 blood transfusions for drops in blood counts related to your treatment.    Diagnosis: Hypertension  Assessment and Plan of Treatment: You have high blood pressure, for which you have been previously on medication including Coreg 25mg, and valsartan 80mg. It is important to continue to take your medications on time,  monitor your blood pressure at home, keep a log of your home readings and follow up with your Primary Care Physician. As per AHA/ACC guidelines, it is important to adhere to a DASH Diet of fresh fruits, vegetables, lean meats such as poultry and fish, and whole wheat carbs. 30 minutes of aerobic exercise per day 3-4 times a week, reducing salt intake <1.5g/day, and cutting down on highly processed foods are also shown to reduce BP. Uncontrolled BP may result in organ damage to your eyes, brain, heart, and kidneys by causing strokes, heart attacks, kidney failure, and bleeds in your eye.      Diagnosis: Hyperlipidemia  Assessment and Plan of Treatment: You have a history of high blood fats and were on lipitor 80mg. You were continued on the statin during this hospital admission. Your blood tests showed well controlled lipid levels in your body. You are advised to continue taking this medication daily and seek medical attention if you have intense muscle aches, or reddish discoloration of the urine.

## 2021-06-13 NOTE — CHART NOTE - NSCHARTNOTEFT_GEN_A_CORE
Patient admitted for pancytopenia with known myelodysplastic syndrome. Patient was signed out to follow up CBC post PRBC transfusion and monitor platelet count. CBC post 2 units of PRBC hg 8.6 and platelets 14, no active bleeding, no platelets transfusion for now. Pt s/p IVIG x 1 dose.    Primary team to follow up in the am.

## 2021-06-25 ENCOUNTER — EMERGENCY (EMERGENCY)
Facility: HOSPITAL | Age: 76
LOS: 1 days | Discharge: HOME | End: 2021-06-25
Attending: EMERGENCY MEDICINE
Payer: MEDICARE

## 2021-06-25 VITALS
TEMPERATURE: 98 F | SYSTOLIC BLOOD PRESSURE: 106 MMHG | DIASTOLIC BLOOD PRESSURE: 45 MMHG | WEIGHT: 128.09 LBS | RESPIRATION RATE: 17 BRPM | OXYGEN SATURATION: 100 % | HEIGHT: 65 IN | HEART RATE: 74 BPM

## 2021-06-25 VITALS — HEART RATE: 78 BPM | SYSTOLIC BLOOD PRESSURE: 179 MMHG | DIASTOLIC BLOOD PRESSURE: 74 MMHG

## 2021-06-25 LAB
ALBUMIN SERPL ELPH-MCNC: 2.8 G/DL — LOW (ref 3.5–5)
ALP SERPL-CCNC: 63 U/L — SIGNIFICANT CHANGE UP (ref 40–120)
ALT FLD-CCNC: 26 U/L DA — SIGNIFICANT CHANGE UP (ref 10–60)
ANION GAP SERPL CALC-SCNC: 8 MMOL/L — SIGNIFICANT CHANGE UP (ref 5–17)
AST SERPL-CCNC: 20 U/L — SIGNIFICANT CHANGE UP (ref 10–40)
BASOPHILS # BLD AUTO: 0 K/UL — SIGNIFICANT CHANGE UP (ref 0–0.2)
BASOPHILS NFR BLD AUTO: 0 % — SIGNIFICANT CHANGE UP (ref 0–2)
BILIRUB SERPL-MCNC: 0.5 MG/DL — SIGNIFICANT CHANGE UP (ref 0.2–1.2)
BUN SERPL-MCNC: 29 MG/DL — HIGH (ref 7–18)
CALCIUM SERPL-MCNC: 8.2 MG/DL — LOW (ref 8.4–10.5)
CHLORIDE SERPL-SCNC: 105 MMOL/L — SIGNIFICANT CHANGE UP (ref 96–108)
CO2 SERPL-SCNC: 25 MMOL/L — SIGNIFICANT CHANGE UP (ref 22–31)
CREAT SERPL-MCNC: 0.83 MG/DL — SIGNIFICANT CHANGE UP (ref 0.5–1.3)
EOSINOPHIL # BLD AUTO: 0.05 K/UL — SIGNIFICANT CHANGE UP (ref 0–0.5)
EOSINOPHIL NFR BLD AUTO: 1 % — SIGNIFICANT CHANGE UP (ref 0–6)
GLUCOSE SERPL-MCNC: 105 MG/DL — HIGH (ref 70–99)
HCT VFR BLD CALC: 20.3 % — CRITICAL LOW (ref 34.5–45)
HGB BLD-MCNC: 5.9 G/DL — CRITICAL LOW (ref 11.5–15.5)
LYMPHOCYTES # BLD AUTO: 1.35 K/UL — SIGNIFICANT CHANGE UP (ref 1–3.3)
LYMPHOCYTES # BLD AUTO: 26 % — SIGNIFICANT CHANGE UP (ref 13–44)
MCHC RBC-ENTMCNC: 28.9 PG — SIGNIFICANT CHANGE UP (ref 27–34)
MCHC RBC-ENTMCNC: 29.1 GM/DL — LOW (ref 32–36)
MCV RBC AUTO: 99.5 FL — SIGNIFICANT CHANGE UP (ref 80–100)
MONOCYTES # BLD AUTO: 0.1 K/UL — SIGNIFICANT CHANGE UP (ref 0–0.9)
MONOCYTES NFR BLD AUTO: 2 % — SIGNIFICANT CHANGE UP (ref 2–14)
NEUTROPHILS # BLD AUTO: 3.68 K/UL — SIGNIFICANT CHANGE UP (ref 1.8–7.4)
NEUTROPHILS NFR BLD AUTO: 71 % — SIGNIFICANT CHANGE UP (ref 43–77)
PLATELET # BLD AUTO: 11 K/UL — CRITICAL LOW (ref 150–400)
POTASSIUM SERPL-MCNC: 3.9 MMOL/L — SIGNIFICANT CHANGE UP (ref 3.5–5.3)
POTASSIUM SERPL-SCNC: 3.9 MMOL/L — SIGNIFICANT CHANGE UP (ref 3.5–5.3)
PROT SERPL-MCNC: 6 G/DL — SIGNIFICANT CHANGE UP (ref 6–8.3)
RBC # BLD: 2.04 M/UL — LOW (ref 3.8–5.2)
RBC # FLD: 16.2 % — HIGH (ref 10.3–14.5)
SODIUM SERPL-SCNC: 138 MMOL/L — SIGNIFICANT CHANGE UP (ref 135–145)
WBC # BLD: 5.19 K/UL — SIGNIFICANT CHANGE UP (ref 3.8–10.5)
WBC # FLD AUTO: 5.19 K/UL — SIGNIFICANT CHANGE UP (ref 3.8–10.5)

## 2021-06-25 PROCEDURE — 86850 RBC ANTIBODY SCREEN: CPT

## 2021-06-25 PROCEDURE — 86901 BLOOD TYPING SEROLOGIC RH(D): CPT

## 2021-06-25 PROCEDURE — 86900 BLOOD TYPING SEROLOGIC ABO: CPT

## 2021-06-25 PROCEDURE — 36415 COLL VENOUS BLD VENIPUNCTURE: CPT

## 2021-06-25 PROCEDURE — 80053 COMPREHEN METABOLIC PANEL: CPT

## 2021-06-25 PROCEDURE — 85025 COMPLETE CBC W/AUTO DIFF WBC: CPT

## 2021-06-25 PROCEDURE — 99284 EMERGENCY DEPT VISIT MOD MDM: CPT | Mod: 25

## 2021-06-25 PROCEDURE — 86923 COMPATIBILITY TEST ELECTRIC: CPT

## 2021-06-25 PROCEDURE — 99284 EMERGENCY DEPT VISIT MOD MDM: CPT

## 2021-06-25 PROCEDURE — 36430 TRANSFUSION BLD/BLD COMPNT: CPT

## 2021-06-25 PROCEDURE — P9040: CPT

## 2021-06-25 RX ORDER — ACETAMINOPHEN 500 MG
650 TABLET ORAL ONCE
Refills: 0 | Status: COMPLETED | OUTPATIENT
Start: 2021-06-25 | End: 2021-06-25

## 2021-06-25 RX ADMIN — Medication 650 MILLIGRAM(S): at 13:07

## 2021-06-25 RX ADMIN — Medication 650 MILLIGRAM(S): at 14:00

## 2021-06-25 NOTE — ED PROVIDER NOTE - OBJECTIVE STATEMENT
74 y/o F patient with a significant PMHx of myelodysplastic syndrome, anemia presents to the ED with c/o abnormal lab result. Patient reports having going to his PMD and was found to have a Hemoglobin level of 7.1. Patient states being weak at times. Patient denies any chest pain, nausea and vomiting, or any other complaints.

## 2021-06-25 NOTE — ED PROVIDER NOTE - PATIENT PORTAL LINK FT
You can access the FollowMyHealth Patient Portal offered by [facility] by registering at the following website: [email or web address]. By joining [employer]’s FollowMyHealth portal, you will also be able to view your health information using other applications (apps) compatible with our system.

## 2021-06-25 NOTE — ED ADULT NURSE NOTE - OBJECTIVE STATEMENT
patient sent to ED by PCP for blood transfusion. Patient A&Ox4, denies pain discomfort. No signs active bleeding.

## 2021-06-25 NOTE — ED PROVIDER NOTE - PROGRESS NOTE DETAILS
IMER: Patient signed out to me by Dr. Means. Patient with anemia due to MDS. Clary spoke to Dr. Mosley, who requested 2 units PRBCs then d/c. Patient is resting comfortably, NAD. Return to the ED immediately if getting worse, not improving, or if having any new or troubling symptoms. f/u with PMGUILLAUME and Dr. Mosley in 2-3 days. Noted platelets 11K. I spoke with Dr. Mosley again, he said no platelet transfusion, only RBC. Patient can be discharged because she is not bleeding.

## 2021-06-25 NOTE — ED ADULT TRIAGE NOTE - NS ED TRIAGE AVPU SCALE
3.18
Alert-The patient is alert, awake and responds to voice. The patient is oriented to time, place, and person. The triage nurse is able to obtain subjective information.

## 2021-07-20 ENCOUNTER — EMERGENCY (EMERGENCY)
Facility: HOSPITAL | Age: 76
LOS: 1 days | Discharge: HOME | End: 2021-07-20
Attending: EMERGENCY MEDICINE
Payer: MEDICARE

## 2021-07-20 VITALS
SYSTOLIC BLOOD PRESSURE: 160 MMHG | RESPIRATION RATE: 18 BRPM | DIASTOLIC BLOOD PRESSURE: 70 MMHG | TEMPERATURE: 98 F | OXYGEN SATURATION: 98 % | HEART RATE: 73 BPM

## 2021-07-20 VITALS
DIASTOLIC BLOOD PRESSURE: 53 MMHG | TEMPERATURE: 98 F | SYSTOLIC BLOOD PRESSURE: 121 MMHG | RESPIRATION RATE: 18 BRPM | HEIGHT: 65 IN | HEART RATE: 72 BPM | WEIGHT: 132.06 LBS | OXYGEN SATURATION: 98 %

## 2021-07-20 LAB
ALBUMIN SERPL ELPH-MCNC: 2.9 G/DL — LOW (ref 3.5–5)
ALP SERPL-CCNC: 52 U/L — SIGNIFICANT CHANGE UP (ref 40–120)
ALT FLD-CCNC: 25 U/L DA — SIGNIFICANT CHANGE UP (ref 10–60)
ANION GAP SERPL CALC-SCNC: 10 MMOL/L — SIGNIFICANT CHANGE UP (ref 5–17)
APTT BLD: 25.4 SEC — LOW (ref 27.5–35.5)
AST SERPL-CCNC: 26 U/L — SIGNIFICANT CHANGE UP (ref 10–40)
BASOPHILS # BLD AUTO: 0.01 K/UL — SIGNIFICANT CHANGE UP (ref 0–0.2)
BASOPHILS NFR BLD AUTO: 0.1 % — SIGNIFICANT CHANGE UP (ref 0–2)
BILIRUB SERPL-MCNC: 0.7 MG/DL — SIGNIFICANT CHANGE UP (ref 0.2–1.2)
BUN SERPL-MCNC: 30 MG/DL — HIGH (ref 7–18)
CALCIUM SERPL-MCNC: 8.8 MG/DL — SIGNIFICANT CHANGE UP (ref 8.4–10.5)
CHLORIDE SERPL-SCNC: 105 MMOL/L — SIGNIFICANT CHANGE UP (ref 96–108)
CO2 SERPL-SCNC: 24 MMOL/L — SIGNIFICANT CHANGE UP (ref 22–31)
CREAT SERPL-MCNC: 1.12 MG/DL — SIGNIFICANT CHANGE UP (ref 0.5–1.3)
EOSINOPHIL # BLD AUTO: 0.01 K/UL — SIGNIFICANT CHANGE UP (ref 0–0.5)
EOSINOPHIL NFR BLD AUTO: 0.1 % — SIGNIFICANT CHANGE UP (ref 0–6)
GLUCOSE SERPL-MCNC: 98 MG/DL — SIGNIFICANT CHANGE UP (ref 70–99)
HCT VFR BLD CALC: 23 % — LOW (ref 34.5–45)
HGB BLD-MCNC: 6.9 G/DL — CRITICAL LOW (ref 11.5–15.5)
IMM GRANULOCYTES NFR BLD AUTO: 3.3 % — HIGH (ref 0–1.5)
INR BLD: 0.95 RATIO — SIGNIFICANT CHANGE UP (ref 0.88–1.16)
LYMPHOCYTES # BLD AUTO: 2.86 K/UL — SIGNIFICANT CHANGE UP (ref 1–3.3)
LYMPHOCYTES # BLD AUTO: 41.6 % — SIGNIFICANT CHANGE UP (ref 13–44)
MCHC RBC-ENTMCNC: 28 PG — SIGNIFICANT CHANGE UP (ref 27–34)
MCHC RBC-ENTMCNC: 30 GM/DL — LOW (ref 32–36)
MCV RBC AUTO: 93.5 FL — SIGNIFICANT CHANGE UP (ref 80–100)
MONOCYTES # BLD AUTO: 0.35 K/UL — SIGNIFICANT CHANGE UP (ref 0–0.9)
MONOCYTES NFR BLD AUTO: 5.1 % — SIGNIFICANT CHANGE UP (ref 2–14)
NEUTROPHILS # BLD AUTO: 3.42 K/UL — SIGNIFICANT CHANGE UP (ref 1.8–7.4)
NEUTROPHILS NFR BLD AUTO: 49.8 % — SIGNIFICANT CHANGE UP (ref 43–77)
NRBC # BLD: 2 /100 WBCS — HIGH (ref 0–0)
PLATELET # BLD AUTO: 16 K/UL — CRITICAL LOW (ref 150–400)
POTASSIUM SERPL-MCNC: 4.7 MMOL/L — SIGNIFICANT CHANGE UP (ref 3.5–5.3)
POTASSIUM SERPL-SCNC: 4.7 MMOL/L — SIGNIFICANT CHANGE UP (ref 3.5–5.3)
PROT SERPL-MCNC: 6.1 G/DL — SIGNIFICANT CHANGE UP (ref 6–8.3)
PROTHROM AB SERPL-ACNC: 11.3 SEC — SIGNIFICANT CHANGE UP (ref 10.6–13.6)
RBC # BLD: 2.46 M/UL — LOW (ref 3.8–5.2)
RBC # FLD: 17.7 % — HIGH (ref 10.3–14.5)
SODIUM SERPL-SCNC: 139 MMOL/L — SIGNIFICANT CHANGE UP (ref 135–145)
WBC # BLD: 6.88 K/UL — SIGNIFICANT CHANGE UP (ref 3.8–10.5)
WBC # FLD AUTO: 6.88 K/UL — SIGNIFICANT CHANGE UP (ref 3.8–10.5)

## 2021-07-20 PROCEDURE — 85730 THROMBOPLASTIN TIME PARTIAL: CPT

## 2021-07-20 PROCEDURE — 99291 CRITICAL CARE FIRST HOUR: CPT

## 2021-07-20 PROCEDURE — P9040: CPT

## 2021-07-20 PROCEDURE — 86850 RBC ANTIBODY SCREEN: CPT

## 2021-07-20 PROCEDURE — 86900 BLOOD TYPING SEROLOGIC ABO: CPT

## 2021-07-20 PROCEDURE — 86901 BLOOD TYPING SEROLOGIC RH(D): CPT

## 2021-07-20 PROCEDURE — 80053 COMPREHEN METABOLIC PANEL: CPT

## 2021-07-20 PROCEDURE — 86923 COMPATIBILITY TEST ELECTRIC: CPT

## 2021-07-20 PROCEDURE — 85610 PROTHROMBIN TIME: CPT

## 2021-07-20 PROCEDURE — 85025 COMPLETE CBC W/AUTO DIFF WBC: CPT

## 2021-07-20 PROCEDURE — 36430 TRANSFUSION BLD/BLD COMPNT: CPT

## 2021-07-20 PROCEDURE — 99291 CRITICAL CARE FIRST HOUR: CPT | Mod: 25

## 2021-07-20 PROCEDURE — 36415 COLL VENOUS BLD VENIPUNCTURE: CPT

## 2021-07-20 NOTE — ED PROVIDER NOTE - NSFOLLOWUPINSTRUCTIONS_ED_ALL_ED_FT
Anemia    WHAT YOU NEED TO KNOW:    Anemia is a low number of red blood cells or a low amount of hemoglobin in your red blood cells. Hemoglobin is a protein that helps carry oxygen throughout your body. Red blood cells use iron to create hemoglobin. Anemia may develop if your body does not have enough iron. It may also develop if your body does not make enough red blood cells or they die faster than your body can make them.    DISCHARGE INSTRUCTIONS:    Call 911 or have someone call 911 for any of the following:   •You lose consciousness.      •You have severe chest pain.      Seek care immediately if:   •You have dark or bloody bowel movements.          Contact your healthcare provider if:   •Your symptoms are worse, even after treatment.      •You have questions or concerns about your condition or care.      Medicines:   •Iron or folic acid supplements help increase your red blood cell and hemoglobin levels.       •Vitamin B12 injections may help boost your red blood cell level and decrease your symptoms. Ask your healthcare provider how to inject B12.      •Take your medicine as directed. Contact your healthcare provider if you think your medicine is not helping or if you have side effects. Tell him of her if you are allergic to any medicine. Keep a list of the medicines, vitamins, and herbs you take. Include the amounts, and when and why you take them. Bring the list or the pill bottles to follow-up visits. Carry your medicine list with you in case of an emergency.      Prevent anemia: Eat healthy foods rich in iron and vitamin C. Nuts, meat, dark leafy green vegetables, and beans are high in iron and protein. Vitamin C helps your body absorb iron. Foods rich in vitamin C include oranges and other citrus fruits. Ask your healthcare provider for a list of other foods that are high in iron or vitamin C. Ask if you need to be on a special diet.    Sources of Iron       Sources of Vitamin C         Follow up with your healthcare provider as directed: Write down your questions so you remember to ask them during your visits.

## 2021-07-20 NOTE — ED PROVIDER NOTE - PATIENT PORTAL LINK FT
You can access the FollowMyHealth Patient Portal offered by [facility] by registering at the following website: [email or web address]. By joining [employer]’s FollowMyHealth portal, you will also be able to view your health information using other applications (apps) compatible with our system.

## 2021-07-20 NOTE — ED PROVIDER NOTE - OBJECTIVE STATEMENT
76 y.o female with a significant history of anemia, HTN, and myelodysplastic syndrome, has been treat by Dr. Mosley and is here in the ED for a blood transtrusion. Patient's hemoglobin yesterday was 7.1. Patient denies bleeding or any other complaints. Allergy to Penicillin.

## 2021-07-20 NOTE — ED ADULT NURSE NOTE - OBJECTIVE STATEMENT
Pt. sent by Dr. Morrissey for blood transfusion. Pt. stated hgb was in the 7s. Pt. has leukemia and gets frequent transfusions.

## 2021-07-20 NOTE — ED ADULT NURSE REASSESSMENT NOTE - NS ED NURSE REASSESS COMMENT FT1
Pt. received 2 units of blood with no adverse reaction. Pt. verbalized improvement and no complaints voiced. Pt. is stable for discharge.

## 2021-08-03 ENCOUNTER — EMERGENCY (EMERGENCY)
Facility: HOSPITAL | Age: 76
LOS: 1 days | Discharge: HOME | End: 2021-08-03
Attending: EMERGENCY MEDICINE
Payer: MEDICARE

## 2021-08-03 VITALS
RESPIRATION RATE: 18 BRPM | OXYGEN SATURATION: 96 % | SYSTOLIC BLOOD PRESSURE: 148 MMHG | DIASTOLIC BLOOD PRESSURE: 73 MMHG | TEMPERATURE: 98 F | HEART RATE: 65 BPM

## 2021-08-03 VITALS
HEIGHT: 65 IN | RESPIRATION RATE: 16 BRPM | SYSTOLIC BLOOD PRESSURE: 115 MMHG | OXYGEN SATURATION: 97 % | WEIGHT: 134.04 LBS | DIASTOLIC BLOOD PRESSURE: 64 MMHG | TEMPERATURE: 98 F | HEART RATE: 71 BPM

## 2021-08-03 LAB
ALBUMIN SERPL ELPH-MCNC: 3 G/DL — LOW (ref 3.5–5)
ALP SERPL-CCNC: 51 U/L — SIGNIFICANT CHANGE UP (ref 40–120)
ALT FLD-CCNC: 22 U/L DA — SIGNIFICANT CHANGE UP (ref 10–60)
ANION GAP SERPL CALC-SCNC: 8 MMOL/L — SIGNIFICANT CHANGE UP (ref 5–17)
AST SERPL-CCNC: 14 U/L — SIGNIFICANT CHANGE UP (ref 10–40)
BASOPHILS # BLD AUTO: 0 K/UL — SIGNIFICANT CHANGE UP (ref 0–0.2)
BASOPHILS NFR BLD AUTO: 0 % — SIGNIFICANT CHANGE UP (ref 0–2)
BILIRUB SERPL-MCNC: 0.6 MG/DL — SIGNIFICANT CHANGE UP (ref 0.2–1.2)
BLD GP AB SCN SERPL QL: SIGNIFICANT CHANGE UP
BUN SERPL-MCNC: 31 MG/DL — HIGH (ref 7–18)
CALCIUM SERPL-MCNC: 8.6 MG/DL — SIGNIFICANT CHANGE UP (ref 8.4–10.5)
CHLORIDE SERPL-SCNC: 102 MMOL/L — SIGNIFICANT CHANGE UP (ref 96–108)
CO2 SERPL-SCNC: 26 MMOL/L — SIGNIFICANT CHANGE UP (ref 22–31)
CREAT SERPL-MCNC: 1.02 MG/DL — SIGNIFICANT CHANGE UP (ref 0.5–1.3)
EOSINOPHIL # BLD AUTO: 0 K/UL — SIGNIFICANT CHANGE UP (ref 0–0.5)
EOSINOPHIL NFR BLD AUTO: 0 % — SIGNIFICANT CHANGE UP (ref 0–6)
GLUCOSE SERPL-MCNC: 103 MG/DL — HIGH (ref 70–99)
HCT VFR BLD CALC: 23.1 % — LOW (ref 34.5–45)
HGB BLD-MCNC: 6.9 G/DL — CRITICAL LOW (ref 11.5–15.5)
LYMPHOCYTES # BLD AUTO: 1 K/UL — SIGNIFICANT CHANGE UP (ref 1–3.3)
LYMPHOCYTES # BLD AUTO: 10 % — LOW (ref 13–44)
MCHC RBC-ENTMCNC: 28 PG — SIGNIFICANT CHANGE UP (ref 27–34)
MCHC RBC-ENTMCNC: 29.9 GM/DL — LOW (ref 32–36)
MCV RBC AUTO: 93.9 FL — SIGNIFICANT CHANGE UP (ref 80–100)
MONOCYTES # BLD AUTO: 0 K/UL — SIGNIFICANT CHANGE UP (ref 0–0.9)
MONOCYTES NFR BLD AUTO: 0 % — LOW (ref 2–14)
NEUTROPHILS # BLD AUTO: 8.99 K/UL — HIGH (ref 1.8–7.4)
NEUTROPHILS NFR BLD AUTO: 90 % — HIGH (ref 43–77)
PLATELET # BLD AUTO: 17 K/UL — CRITICAL LOW (ref 150–400)
POTASSIUM SERPL-MCNC: 4.8 MMOL/L — SIGNIFICANT CHANGE UP (ref 3.5–5.3)
POTASSIUM SERPL-SCNC: 4.8 MMOL/L — SIGNIFICANT CHANGE UP (ref 3.5–5.3)
PROT SERPL-MCNC: 6.1 G/DL — SIGNIFICANT CHANGE UP (ref 6–8.3)
RBC # BLD: 2.46 M/UL — LOW (ref 3.8–5.2)
RBC # FLD: 16.9 % — HIGH (ref 10.3–14.5)
SARS-COV-2 RNA SPEC QL NAA+PROBE: SIGNIFICANT CHANGE UP
SODIUM SERPL-SCNC: 136 MMOL/L — SIGNIFICANT CHANGE UP (ref 135–145)
WBC # BLD: 9.99 K/UL — SIGNIFICANT CHANGE UP (ref 3.8–10.5)
WBC # FLD AUTO: 9.99 K/UL — SIGNIFICANT CHANGE UP (ref 3.8–10.5)

## 2021-08-03 PROCEDURE — 86923 COMPATIBILITY TEST ELECTRIC: CPT

## 2021-08-03 PROCEDURE — 99284 EMERGENCY DEPT VISIT MOD MDM: CPT

## 2021-08-03 PROCEDURE — 99284 EMERGENCY DEPT VISIT MOD MDM: CPT | Mod: 25

## 2021-08-03 PROCEDURE — 87635 SARS-COV-2 COVID-19 AMP PRB: CPT

## 2021-08-03 PROCEDURE — 85025 COMPLETE CBC W/AUTO DIFF WBC: CPT

## 2021-08-03 PROCEDURE — 86900 BLOOD TYPING SEROLOGIC ABO: CPT

## 2021-08-03 PROCEDURE — 86901 BLOOD TYPING SEROLOGIC RH(D): CPT

## 2021-08-03 PROCEDURE — 36415 COLL VENOUS BLD VENIPUNCTURE: CPT

## 2021-08-03 PROCEDURE — 96374 THER/PROPH/DIAG INJ IV PUSH: CPT

## 2021-08-03 PROCEDURE — 80053 COMPREHEN METABOLIC PANEL: CPT

## 2021-08-03 PROCEDURE — P9040: CPT

## 2021-08-03 PROCEDURE — 86850 RBC ANTIBODY SCREEN: CPT

## 2021-08-03 PROCEDURE — 36430 TRANSFUSION BLD/BLD COMPNT: CPT

## 2021-08-03 RX ORDER — FUROSEMIDE 40 MG
20 TABLET ORAL ONCE
Refills: 0 | Status: COMPLETED | OUTPATIENT
Start: 2021-08-03 | End: 2021-08-03

## 2021-08-03 RX ADMIN — Medication 20 MILLIGRAM(S): at 16:49

## 2021-08-03 NOTE — ED PROVIDER NOTE - OBJECTIVE STATEMENT
76 year old female is presenting in the ED sent by Dr. Mosley for low hemoglobin count. Patient's  hemoglobin count is 71 and no signs of internal bleeding or rectal bleeding.

## 2021-08-03 NOTE — ED ADULT NURSE NOTE - OBJECTIVE STATEMENT
Pt. c/o dizziness, light headed, and weakness that started yesterday. Pt. went for blood work yesterday and hgb is low. Pt. receives blood transfusions about every two weeks.

## 2021-08-03 NOTE — ED PROVIDER NOTE - PROGRESS NOTE DETAILS
Patient is upset of the duration of time as she was waiting. Discussed with Dr. Mosley to administer 2 units of PRBC. Patient was signed out to me by Dr. Castro with plan to discharge after 2nd unit of PRBCs. Patient well appearing, asymptomatic, discharged with PMD followup.

## 2021-08-03 NOTE — ED PROVIDER NOTE - CLINICAL SUMMARY MEDICAL DECISION MAKING FREE TEXT BOX
This is a 76 year old female patient presenting in the ED with a low hemoglobin blood count. Patient's blood count currently is 71. Discussed with hematologist for descending blood work. Discussed with Dr. Mosley for blood transfusion. Will check type and screen. This is a 76 year old female patient presenting in the ED with a low hemoglobin blood count. Patient's hb yest was 7.1. Discussed with hematologist Dr. Mosley for blood transfusion - hb slowly declining due to mds and not loss. Will check type and screen.

## 2021-08-03 NOTE — ED PROVIDER NOTE - PATIENT PORTAL LINK FT
You can access the FollowMyHealth Patient Portal offered by [facility] by registering at the following website: [email or web address]. By joining [employer]’s FollowMyHealth portal, you will also be able to view your health information using other applications (apps) compatible with our system.

## 2021-08-03 NOTE — ED PROVIDER NOTE - NSFOLLOWUPINSTRUCTIONS_ED_ALL_ED_FT
You were seen in the emergency department for: blood transfusion for anemia  Your diagnosis for this visit was: anemia  You received 2 units of packed red blood cells and were discharged.    You may be contacted by the Emergency Department Referrals Coordinator to set up your follow-up appointment within 24-48 hours of your discharge, Monday to Friday. We recommend you follow up with:  your private doctors.    Please return to the Emergency Department if you experience any of the following symptoms:   - Shortness of breath or trouble breathing  - Pressure, pain or tightness in the chest  - Face drooping, arm weakness or speech difficulty  - Persistence of severe vomiting  - Head injury or loss of consciousness  - Nonstop bleeding or an open wound    (1) Follow up with your primary care physician within the next 24-48 hours as discussed. In addition, we did not find evidence of a life threatening illness on your testing here today, but listed below are the specialists that will be necessary to see as an outpatient to continue the workup.  Please call the numbers listed below or [phone] to set up the necessary appointments.  (2) Take Tylenol (up to 1000mg or 1 g)  and/or Motrin (up to 600mg) up to every 6 hours as needed for pain.   (3) If you had an IV (intravenous) line placed, it was removed. Sometimes, after IV removal, that area can be tender for a few days; if it develops redness and swelling, those could be signs of infection; in which case, return to the Emergency Department for assessment.  (4) Please continue taking all of your home medications as directed.

## 2021-09-14 ENCOUNTER — EMERGENCY (EMERGENCY)
Facility: HOSPITAL | Age: 76
LOS: 1 days | Discharge: HOME | End: 2021-09-14
Attending: STUDENT IN AN ORGANIZED HEALTH CARE EDUCATION/TRAINING PROGRAM
Payer: MEDICARE

## 2021-09-14 VITALS
OXYGEN SATURATION: 98 % | HEART RATE: 72 BPM | SYSTOLIC BLOOD PRESSURE: 156 MMHG | TEMPERATURE: 99 F | DIASTOLIC BLOOD PRESSURE: 66 MMHG | RESPIRATION RATE: 18 BRPM

## 2021-09-14 VITALS
HEART RATE: 67 BPM | OXYGEN SATURATION: 98 % | DIASTOLIC BLOOD PRESSURE: 87 MMHG | HEIGHT: 65 IN | SYSTOLIC BLOOD PRESSURE: 168 MMHG | RESPIRATION RATE: 18 BRPM | TEMPERATURE: 98 F

## 2021-09-14 LAB
ALBUMIN SERPL ELPH-MCNC: 3.2 G/DL — LOW (ref 3.5–5)
ALP SERPL-CCNC: 36 U/L — LOW (ref 40–120)
ALT FLD-CCNC: 16 U/L DA — SIGNIFICANT CHANGE UP (ref 10–60)
ANION GAP SERPL CALC-SCNC: 7 MMOL/L — SIGNIFICANT CHANGE UP (ref 5–17)
APTT BLD: 26.4 SEC — LOW (ref 27.5–35.5)
AST SERPL-CCNC: 14 U/L — SIGNIFICANT CHANGE UP (ref 10–40)
BASOPHILS # BLD AUTO: 0 K/UL — SIGNIFICANT CHANGE UP (ref 0–0.2)
BASOPHILS NFR BLD AUTO: 0 % — SIGNIFICANT CHANGE UP (ref 0–2)
BILIRUB SERPL-MCNC: 0.9 MG/DL — SIGNIFICANT CHANGE UP (ref 0.2–1.2)
BUN SERPL-MCNC: 39 MG/DL — HIGH (ref 7–18)
CALCIUM SERPL-MCNC: 9 MG/DL — SIGNIFICANT CHANGE UP (ref 8.4–10.5)
CHLORIDE SERPL-SCNC: 105 MMOL/L — SIGNIFICANT CHANGE UP (ref 96–108)
CO2 SERPL-SCNC: 25 MMOL/L — SIGNIFICANT CHANGE UP (ref 22–31)
CREAT SERPL-MCNC: 1.19 MG/DL — SIGNIFICANT CHANGE UP (ref 0.5–1.3)
EOSINOPHIL # BLD AUTO: 0 K/UL — SIGNIFICANT CHANGE UP (ref 0–0.5)
EOSINOPHIL NFR BLD AUTO: 0 % — SIGNIFICANT CHANGE UP (ref 0–6)
GLUCOSE SERPL-MCNC: 113 MG/DL — HIGH (ref 70–99)
HCT VFR BLD CALC: 18.4 % — CRITICAL LOW (ref 34.5–45)
HGB BLD-MCNC: 5.2 G/DL — CRITICAL LOW (ref 11.5–15.5)
INR BLD: 1.03 RATIO — SIGNIFICANT CHANGE UP (ref 0.88–1.16)
LYMPHOCYTES # BLD AUTO: 1.61 K/UL — SIGNIFICANT CHANGE UP (ref 1–3.3)
LYMPHOCYTES # BLD AUTO: 22 % — SIGNIFICANT CHANGE UP (ref 13–44)
MCHC RBC-ENTMCNC: 28.3 GM/DL — LOW (ref 32–36)
MCHC RBC-ENTMCNC: 28.4 PG — SIGNIFICANT CHANGE UP (ref 27–34)
MCV RBC AUTO: 100.5 FL — HIGH (ref 80–100)
MONOCYTES # BLD AUTO: 0.07 K/UL — SIGNIFICANT CHANGE UP (ref 0–0.9)
MONOCYTES NFR BLD AUTO: 1 % — LOW (ref 2–14)
NEUTROPHILS # BLD AUTO: 5.65 K/UL — SIGNIFICANT CHANGE UP (ref 1.8–7.4)
NEUTROPHILS NFR BLD AUTO: 77 % — SIGNIFICANT CHANGE UP (ref 43–77)
PLATELET # BLD AUTO: 20 K/UL — CRITICAL LOW (ref 150–400)
POTASSIUM SERPL-MCNC: 4.8 MMOL/L — SIGNIFICANT CHANGE UP (ref 3.5–5.3)
POTASSIUM SERPL-SCNC: 4.8 MMOL/L — SIGNIFICANT CHANGE UP (ref 3.5–5.3)
PROT SERPL-MCNC: 5.9 G/DL — LOW (ref 6–8.3)
PROTHROM AB SERPL-ACNC: 12.2 SEC — SIGNIFICANT CHANGE UP (ref 10.6–13.6)
RBC # BLD: 1.83 M/UL — LOW (ref 3.8–5.2)
RBC # FLD: 20.1 % — HIGH (ref 10.3–14.5)
SODIUM SERPL-SCNC: 137 MMOL/L — SIGNIFICANT CHANGE UP (ref 135–145)
WBC # BLD: 7.34 K/UL — SIGNIFICANT CHANGE UP (ref 3.8–10.5)
WBC # FLD AUTO: 7.34 K/UL — SIGNIFICANT CHANGE UP (ref 3.8–10.5)

## 2021-09-14 PROCEDURE — 86901 BLOOD TYPING SEROLOGIC RH(D): CPT

## 2021-09-14 PROCEDURE — 80053 COMPREHEN METABOLIC PANEL: CPT

## 2021-09-14 PROCEDURE — 36430 TRANSFUSION BLD/BLD COMPNT: CPT

## 2021-09-14 PROCEDURE — 85610 PROTHROMBIN TIME: CPT

## 2021-09-14 PROCEDURE — P9040: CPT

## 2021-09-14 PROCEDURE — 99285 EMERGENCY DEPT VISIT HI MDM: CPT | Mod: 25

## 2021-09-14 PROCEDURE — 86900 BLOOD TYPING SEROLOGIC ABO: CPT

## 2021-09-14 PROCEDURE — 85730 THROMBOPLASTIN TIME PARTIAL: CPT

## 2021-09-14 PROCEDURE — 36415 COLL VENOUS BLD VENIPUNCTURE: CPT

## 2021-09-14 PROCEDURE — 86923 COMPATIBILITY TEST ELECTRIC: CPT

## 2021-09-14 PROCEDURE — 86850 RBC ANTIBODY SCREEN: CPT

## 2021-09-14 PROCEDURE — 85025 COMPLETE CBC W/AUTO DIFF WBC: CPT

## 2021-09-14 PROCEDURE — 99285 EMERGENCY DEPT VISIT HI MDM: CPT

## 2021-09-14 NOTE — ED PROVIDER NOTE - PROGRESS NOTE DETAILS
Patient given 2 unit, discussed with dr pascual redd, patient stable for outpatient f.u with him. no need for platelet transfusion per dr garner. patient hemodynamically stable on dc

## 2021-09-14 NOTE — ED PROVIDER NOTE - PATIENT PORTAL LINK FT
You can access the FollowMyHealth Patient Portal offered by [facility] by registering at the following website: [email or web address]. By joining [employer]’s FollowMyHealth portal, you will also be able to view your health information using other applications (apps) compatible with our system.

## 2021-09-14 NOTE — ED ADULT NURSE NOTE - OBJECTIVE STATEMENT
pt is here for dizziness.  pt stated that "here for blood transfusion", c/o dizziness, denied chest pain or sob, denied N/V/D

## 2021-09-14 NOTE — ED PROVIDER NOTE - OBJECTIVE STATEMENT
76 y.o w/ pmh of leukemia presenting with lightheadedness. denies n, v, fever, cough. endorses that her recent lab shows hgb 5.4, was sent in for blood transfusion. denies change in stool color, cp, sob. followed by dr pascual redd outpatient.

## 2021-09-14 NOTE — ED PROVIDER NOTE - CLINICAL SUMMARY MEDICAL DECISION MAKING FREE TEXT BOX
Patient presenting with weakness, known anemia. vital stable. will obtain lab, assess hgb, likely transfuse. ed obs and reassess

## 2021-09-14 NOTE — ED ADULT NURSE NOTE - NS ED NURSE RECORD ANOTHER HT AND WT
HISTORY OF PRESENT ILLNESS  Richar Oleary is a 65 Yes

## 2021-11-04 ENCOUNTER — EMERGENCY (EMERGENCY)
Facility: HOSPITAL | Age: 76
LOS: 1 days | Discharge: HOME | End: 2021-11-04
Attending: STUDENT IN AN ORGANIZED HEALTH CARE EDUCATION/TRAINING PROGRAM
Payer: MEDICARE

## 2021-11-04 VITALS
HEART RATE: 88 BPM | SYSTOLIC BLOOD PRESSURE: 183 MMHG | RESPIRATION RATE: 20 BRPM | OXYGEN SATURATION: 98 % | TEMPERATURE: 98 F | DIASTOLIC BLOOD PRESSURE: 73 MMHG

## 2021-11-04 VITALS
WEIGHT: 136.03 LBS | OXYGEN SATURATION: 99 % | DIASTOLIC BLOOD PRESSURE: 55 MMHG | TEMPERATURE: 98 F | RESPIRATION RATE: 18 BRPM | HEIGHT: 64 IN | SYSTOLIC BLOOD PRESSURE: 121 MMHG | HEART RATE: 77 BPM

## 2021-11-04 LAB
ALBUMIN SERPL ELPH-MCNC: 2.5 G/DL — LOW (ref 3.5–5)
ALP SERPL-CCNC: 46 U/L — SIGNIFICANT CHANGE UP (ref 40–120)
ALT FLD-CCNC: 17 U/L DA — SIGNIFICANT CHANGE UP (ref 10–60)
ANION GAP SERPL CALC-SCNC: 7 MMOL/L — SIGNIFICANT CHANGE UP (ref 5–17)
ANISOCYTOSIS BLD QL: SLIGHT — SIGNIFICANT CHANGE UP
APTT BLD: 26.8 SEC — LOW (ref 27.5–35.5)
AST SERPL-CCNC: 12 U/L — SIGNIFICANT CHANGE UP (ref 10–40)
BASOPHILS # BLD AUTO: 0 K/UL — SIGNIFICANT CHANGE UP (ref 0–0.2)
BASOPHILS NFR BLD AUTO: 0 % — SIGNIFICANT CHANGE UP (ref 0–2)
BILIRUB SERPL-MCNC: 0.6 MG/DL — SIGNIFICANT CHANGE UP (ref 0.2–1.2)
BLD GP AB SCN SERPL QL: SIGNIFICANT CHANGE UP
BUN SERPL-MCNC: 39 MG/DL — HIGH (ref 7–18)
CALCIUM SERPL-MCNC: 8.3 MG/DL — LOW (ref 8.4–10.5)
CHLORIDE SERPL-SCNC: 107 MMOL/L — SIGNIFICANT CHANGE UP (ref 96–108)
CO2 SERPL-SCNC: 25 MMOL/L — SIGNIFICANT CHANGE UP (ref 22–31)
CREAT SERPL-MCNC: 1.09 MG/DL — SIGNIFICANT CHANGE UP (ref 0.5–1.3)
DACRYOCYTES BLD QL SMEAR: SLIGHT — SIGNIFICANT CHANGE UP
ELLIPTOCYTES BLD QL SMEAR: SLIGHT — SIGNIFICANT CHANGE UP
EOSINOPHIL # BLD AUTO: 0 K/UL — SIGNIFICANT CHANGE UP (ref 0–0.5)
EOSINOPHIL NFR BLD AUTO: 0 % — SIGNIFICANT CHANGE UP (ref 0–6)
GLUCOSE SERPL-MCNC: 117 MG/DL — HIGH (ref 70–99)
HCT VFR BLD CALC: 22.3 % — LOW (ref 34.5–45)
HGB BLD-MCNC: 6.4 G/DL — CRITICAL LOW (ref 11.5–15.5)
HYPOCHROMIA BLD QL: SLIGHT — SIGNIFICANT CHANGE UP
INR BLD: 1.04 RATIO — SIGNIFICANT CHANGE UP (ref 0.88–1.16)
LG PLATELETS BLD QL AUTO: SLIGHT — SIGNIFICANT CHANGE UP
LYMPHOCYTES # BLD AUTO: 0.55 K/UL — LOW (ref 1–3.3)
LYMPHOCYTES # BLD AUTO: 13 % — SIGNIFICANT CHANGE UP (ref 13–44)
MANUAL SMEAR VERIFICATION: SIGNIFICANT CHANGE UP
MCHC RBC-ENTMCNC: 27.6 PG — SIGNIFICANT CHANGE UP (ref 27–34)
MCHC RBC-ENTMCNC: 28.7 GM/DL — LOW (ref 32–36)
MCV RBC AUTO: 96.1 FL — SIGNIFICANT CHANGE UP (ref 80–100)
MONOCYTES # BLD AUTO: 0.04 K/UL — SIGNIFICANT CHANGE UP (ref 0–0.9)
MONOCYTES NFR BLD AUTO: 1 % — LOW (ref 2–14)
NEUTROPHILS # BLD AUTO: 3.62 K/UL — SIGNIFICANT CHANGE UP (ref 1.8–7.4)
NEUTROPHILS NFR BLD AUTO: 84 % — HIGH (ref 43–77)
NEUTS BAND # BLD: 2 % — SIGNIFICANT CHANGE UP (ref 0–8)
NRBC # BLD: 0 /100 — SIGNIFICANT CHANGE UP (ref 0–0)
PLAT MORPH BLD: NORMAL — SIGNIFICANT CHANGE UP
PLATELET # BLD AUTO: 19 K/UL — CRITICAL LOW (ref 150–400)
PLATELET COUNT - ESTIMATE: ABNORMAL
POIKILOCYTOSIS BLD QL AUTO: SLIGHT — SIGNIFICANT CHANGE UP
POTASSIUM SERPL-MCNC: 5 MMOL/L — SIGNIFICANT CHANGE UP (ref 3.5–5.3)
POTASSIUM SERPL-SCNC: 5 MMOL/L — SIGNIFICANT CHANGE UP (ref 3.5–5.3)
PROT SERPL-MCNC: 5.6 G/DL — LOW (ref 6–8.3)
PROTHROM AB SERPL-ACNC: 12.4 SEC — SIGNIFICANT CHANGE UP (ref 10.6–13.6)
RBC # BLD: 2.32 M/UL — LOW (ref 3.8–5.2)
RBC # FLD: 17.6 % — HIGH (ref 10.3–14.5)
RBC BLD AUTO: ABNORMAL
SODIUM SERPL-SCNC: 139 MMOL/L — SIGNIFICANT CHANGE UP (ref 135–145)
WBC # BLD: 4.21 K/UL — SIGNIFICANT CHANGE UP (ref 3.8–10.5)
WBC # FLD AUTO: 4.21 K/UL — SIGNIFICANT CHANGE UP (ref 3.8–10.5)

## 2021-11-04 PROCEDURE — 99284 EMERGENCY DEPT VISIT MOD MDM: CPT

## 2021-11-04 PROCEDURE — P9040: CPT

## 2021-11-04 PROCEDURE — 85730 THROMBOPLASTIN TIME PARTIAL: CPT

## 2021-11-04 PROCEDURE — 86901 BLOOD TYPING SEROLOGIC RH(D): CPT

## 2021-11-04 PROCEDURE — 85610 PROTHROMBIN TIME: CPT

## 2021-11-04 PROCEDURE — 80053 COMPREHEN METABOLIC PANEL: CPT

## 2021-11-04 PROCEDURE — 86900 BLOOD TYPING SEROLOGIC ABO: CPT

## 2021-11-04 PROCEDURE — 85025 COMPLETE CBC W/AUTO DIFF WBC: CPT

## 2021-11-04 PROCEDURE — 36415 COLL VENOUS BLD VENIPUNCTURE: CPT

## 2021-11-04 PROCEDURE — 99285 EMERGENCY DEPT VISIT HI MDM: CPT | Mod: 25

## 2021-11-04 PROCEDURE — 86850 RBC ANTIBODY SCREEN: CPT

## 2021-11-04 PROCEDURE — 36430 TRANSFUSION BLD/BLD COMPNT: CPT

## 2021-11-04 PROCEDURE — 86923 COMPATIBILITY TEST ELECTRIC: CPT

## 2021-11-04 NOTE — ED PROVIDER NOTE - PHYSICAL EXAMINATION
Gen: AAOx3, non-toxic  Head: NCAT  HEENT: EOMI, oral mucosa moist, normal conjunctiva  Lung: CTAB, no respiratory distress, no wheezes/rhonchi/rales B/L, speaking in full sentences  CV: RRR, no murmurs, rubs or gallops  Abd: soft, NTND, no guarding, no CVA tenderness  MSK: no visible deformities  Neuro: No focal sensory or motor deficits, normal CN exam   Skin: Warm, well perfused, no rash  Psych: normal affect.     Jackson Casanova, DO

## 2021-11-04 NOTE — ED PROVIDER NOTE - OBJECTIVE STATEMENT
77 y/o female h/o myelodysplastic syndrome, HTN, anemia, presents for low hemoglobin on outpatient labs. Hemoglobin was reportedly 6.6. Sent in by Dr. Mosley for transfusion of 2 units of PRBC's. Pt states she gets transfusions monthly and her anemia is secondary to her chemotherapy. Relates fatigue. Denies any other symptoms.

## 2021-11-04 NOTE — ED PROVIDER NOTE - PROGRESS NOTE DETAILS
Reviewed labs with Dr. Mosley. He recommends 2 units of PRBCs but not platelets. Will dc to f/u with Dr. Mosley once transfusion complete, per his request. omid:  s/o to me from dr. early  MDS pt of Dr. Mosley's who gets monthly transfusion, sent for low hgb. 6.4 in ed. endorsed pending 2U PRBC and reassessment.   pt rcvd both units and states she feels well, well appearing, no complications or reactions.   is seeing dr. mosley tomorrow. per dr mosley instructions, dc w close f/up.

## 2021-11-04 NOTE — ED PROVIDER NOTE - PATIENT PORTAL LINK FT
You can access the FollowMyHealth Patient Portal offered by [facility] by registering at the following website: [email or web address]. By joining [employer]’s FollowMyHealth portal, you will also be able to view your health information using other applications (apps) compatible with our system.

## 2021-11-04 NOTE — ED ADULT NURSE NOTE - NSIMPLEMENTINTERV_GEN_ALL_ED
Mother is RH Positive
Implemented All Fall with Harm Risk Interventions:  [place] to call system. Call bell, personal items and telephone within reach. Instruct patient to call for assistance. Room bathroom lighting operational. Non-slip footwear when patient is off stretcher. Physically safe environment: no spills, clutter or unnecessary equipment. Stretcher in lowest position, wheels locked, appropriate side rails in place. Provide visual cue, wrist band, yellow gown, etc. Monitor gait and stability. Monitor for mental status changes and reorient to person, place, and time. Review medications for side effects contributing to fall risk. Reinforce activity limits and safety measures with patient and family. Provide visual clues: red socks.

## 2021-11-04 NOTE — ED PROVIDER NOTE - NS ED ROS FT
ROS:  GENERAL: +fatigue, no fever, no chills  EYES: no change in vision  HEENT: no trouble swallowing, no trouble speaking  CARDIAC: no chest pain  PULMONARY: no cough, no shortness of breath  GI: no abdominal pain, no nausea, no vomiting, no diarrhea, no constipation  : No dysuria, no frequency, no change in appearance, or odor of urine  SKIN: no rashes  NEURO: no headache, no weakness  MSK: No joint pain    Jackson Casanova, DO

## 2021-11-04 NOTE — ED PROVIDER NOTE - NSFOLLOWUPINSTRUCTIONS_ED_ALL_ED_FT
Follow up with Dr. Mosley in 24-48 hours.   Return to the ER if you develop any new or worsening symptoms such as bleeding, black stool, lightheadedness, chest pain, shortness of breath, numbness, weakness, abdominal pain, nausea, vomiting, or visual changes.

## 2021-11-04 NOTE — ED PROVIDER NOTE - CLINICAL SUMMARY MEDICAL DECISION MAKING FREE TEXT BOX
75 y/o female h/o MDS, presents for low HGB, 6.6, on outpatient labs. Pt is well appearing. Unremarkable exam. No history of bleeding. Will check HGB and likely transfuse per Dr. Mosley's instruction.

## 2021-11-11 ENCOUNTER — EMERGENCY (EMERGENCY)
Facility: HOSPITAL | Age: 76
LOS: 1 days | Discharge: HOME | End: 2021-11-11
Attending: EMERGENCY MEDICINE
Payer: MEDICARE

## 2021-11-11 VITALS
SYSTOLIC BLOOD PRESSURE: 144 MMHG | HEIGHT: 64 IN | OXYGEN SATURATION: 100 % | WEIGHT: 135.8 LBS | RESPIRATION RATE: 16 BRPM | HEART RATE: 92 BPM | TEMPERATURE: 98 F | DIASTOLIC BLOOD PRESSURE: 68 MMHG

## 2021-11-11 LAB
ALBUMIN SERPL ELPH-MCNC: 2.5 G/DL — LOW (ref 3.5–5)
ALP SERPL-CCNC: 41 U/L — SIGNIFICANT CHANGE UP (ref 40–120)
ALT FLD-CCNC: 20 U/L DA — SIGNIFICANT CHANGE UP (ref 10–60)
ANION GAP SERPL CALC-SCNC: 9 MMOL/L — SIGNIFICANT CHANGE UP (ref 5–17)
ANISOCYTOSIS BLD QL: SLIGHT — SIGNIFICANT CHANGE UP
AST SERPL-CCNC: 17 U/L — SIGNIFICANT CHANGE UP (ref 10–40)
BASOPHILS # BLD AUTO: 0 K/UL — SIGNIFICANT CHANGE UP (ref 0–0.2)
BASOPHILS NFR BLD AUTO: 0 % — SIGNIFICANT CHANGE UP (ref 0–2)
BILIRUB SERPL-MCNC: 0.6 MG/DL — SIGNIFICANT CHANGE UP (ref 0.2–1.2)
BLD GP AB SCN SERPL QL: SIGNIFICANT CHANGE UP
BUN SERPL-MCNC: 49 MG/DL — HIGH (ref 7–18)
CALCIUM SERPL-MCNC: 8.3 MG/DL — LOW (ref 8.4–10.5)
CHLORIDE SERPL-SCNC: 105 MMOL/L — SIGNIFICANT CHANGE UP (ref 96–108)
CO2 SERPL-SCNC: 22 MMOL/L — SIGNIFICANT CHANGE UP (ref 22–31)
CREAT SERPL-MCNC: 1.04 MG/DL — SIGNIFICANT CHANGE UP (ref 0.5–1.3)
ELLIPTOCYTES BLD QL SMEAR: SLIGHT — SIGNIFICANT CHANGE UP
EOSINOPHIL # BLD AUTO: 0 K/UL — SIGNIFICANT CHANGE UP (ref 0–0.5)
EOSINOPHIL NFR BLD AUTO: 0 % — SIGNIFICANT CHANGE UP (ref 0–6)
GLUCOSE SERPL-MCNC: 101 MG/DL — HIGH (ref 70–99)
HCT VFR BLD CALC: 14.4 % — CRITICAL LOW (ref 34.5–45)
HGB BLD-MCNC: 4.4 G/DL — CRITICAL LOW (ref 11.5–15.5)
HYPOCHROMIA BLD QL: SLIGHT — SIGNIFICANT CHANGE UP
LYMPHOCYTES # BLD AUTO: 0.42 K/UL — LOW (ref 1–3.3)
LYMPHOCYTES # BLD AUTO: 10 % — LOW (ref 13–44)
MACROCYTES BLD QL: SLIGHT — SIGNIFICANT CHANGE UP
MANUAL SMEAR VERIFICATION: SIGNIFICANT CHANGE UP
MCHC RBC-ENTMCNC: 30.1 PG — SIGNIFICANT CHANGE UP (ref 27–34)
MCHC RBC-ENTMCNC: 30.6 GM/DL — LOW (ref 32–36)
MCV RBC AUTO: 98.6 FL — SIGNIFICANT CHANGE UP (ref 80–100)
MONOCYTES # BLD AUTO: 0.08 K/UL — SIGNIFICANT CHANGE UP (ref 0–0.9)
MONOCYTES NFR BLD AUTO: 2 % — SIGNIFICANT CHANGE UP (ref 2–14)
NEUTROPHILS # BLD AUTO: 2.9 K/UL — SIGNIFICANT CHANGE UP (ref 1.8–7.4)
NEUTROPHILS NFR BLD AUTO: 69 % — SIGNIFICANT CHANGE UP (ref 43–77)
NRBC # BLD: 3 /100 — HIGH (ref 0–0)
PLAT MORPH BLD: NORMAL — SIGNIFICANT CHANGE UP
PLATELET # BLD AUTO: 25 K/UL — LOW (ref 150–400)
PLATELET COUNT - ESTIMATE: ABNORMAL
POIKILOCYTOSIS BLD QL AUTO: SLIGHT — SIGNIFICANT CHANGE UP
POTASSIUM SERPL-MCNC: 4.7 MMOL/L — SIGNIFICANT CHANGE UP (ref 3.5–5.3)
POTASSIUM SERPL-SCNC: 4.7 MMOL/L — SIGNIFICANT CHANGE UP (ref 3.5–5.3)
PROT SERPL-MCNC: 5.4 G/DL — LOW (ref 6–8.3)
RBC # BLD: 1.46 M/UL — LOW (ref 3.8–5.2)
RBC # FLD: 16 % — HIGH (ref 10.3–14.5)
RBC BLD AUTO: ABNORMAL
SODIUM SERPL-SCNC: 136 MMOL/L — SIGNIFICANT CHANGE UP (ref 135–145)
STOMATOCYTES BLD QL SMEAR: SLIGHT — SIGNIFICANT CHANGE UP
TOXIC GRANULES BLD QL SMEAR: PRESENT — SIGNIFICANT CHANGE UP
VARIANT LYMPHS # BLD: 19 % — HIGH (ref 0–6)
WBC # BLD: 4.2 K/UL — SIGNIFICANT CHANGE UP (ref 3.8–10.5)
WBC # FLD AUTO: 4.2 K/UL — SIGNIFICANT CHANGE UP (ref 3.8–10.5)

## 2021-11-11 PROCEDURE — 99284 EMERGENCY DEPT VISIT MOD MDM: CPT

## 2021-11-11 PROCEDURE — 72100 X-RAY EXAM L-S SPINE 2/3 VWS: CPT | Mod: 26

## 2021-11-11 NOTE — ED ADULT NURSE REASSESSMENT NOTE - NS ED NURSE REASSESS COMMENT FT1
pt.remained stable. 1st unit of PRBC completed at 2145 with no ar/ noted. pt.remained stable. 1st unit of PRBC completed at 2130 with no ar/ noted.

## 2021-11-11 NOTE — ED ADULT NURSE NOTE - DOES PATIENT HAVE ADVANCE DIRECTIVE
Aftercare Plan:    Your scheduled appointments are listed below.      • To cancel the appointment, please call the program at least (2) hours before. If outside of business hours, you may leave a message.     • If you have an unexcused absence for your first scheduled program appointment, your future appointments WILL be cancelled to permit the next person on the waiting list to be scheduled.  You may need to wait until another opening occurs, which may delay your treatment. Contact Central Scheduling at [phone] to request an appointment to be reassessed for future services with [facility] Partial Hospitalization Program  For Chemical Dependence Starting Monday, March 29th  In [address], [address]  [phone] opt 4    **Arrive 30 minutes early on your first day at  for registration  Partial Hospitalization Program (PHP):    Patients attend daily programming   Monday through Friday from 9:00 am to 2:30pm      Medications Management:   [facility] DIRK Meneses  [address] [place] [address]  [phone]  Fax: [phone]  Appointment on: Tuesday, March 30th at 3:00pm  *Cleo can do Suboxone therapy. Please arrive 15 minutes early to this appointment and call them if you can't make it and bring your ID, insurance card, and medication list.*    Individual Therapy: (Phone Appointment)  Shalonda Cha LCSW  [address] [place] [address]. [address] [address]  [phone]  Appointment on: Friday, May 21st at 9:30am      This patient is referred to the [facility] PHP, IOP, RTC, [place], OP Program, OP Program or [facility] OP Provider. Providers in these programs have access to the EMR.       Remote Recovery Resources    AA: Hotline  [phone]  [place]: Hotline  791.456.6501  [place]: Hotline  893.245.3966  [facility]:   [phone]    https://www.[employer]COGEON/meetings/hsluds-zq-zztbsaed  https://aachats.org/yg-lhpwgtlb-bqwgms  https://www.Restorius.org  https://[employer].org  https://www.Quidsi.org  https://www.Pocket Change Card  https://heroinanonymous.org  _________________________________________________________________________________________________________________  AODA RECOVERY PLAN    Patient declined []     Preparing for Change:    1. The changes I want to make are: \"Not use at all\"    2. The most important reasons I want to make these changes are: \"I have a family I love, I want to be healthy, I want to finish my degree\"    3. I plan to take these specific steps: \"Take it one day at a time, take medications as prescribed, attend meetings\"    4. The skill(s) I will use to cope with urges to use substances include: \"Go to meetings, reach out to mother\"    Support:    (Please check the meetings you plan to attend)  [] Alcoholics Anonymous: (369) 201-1149 [] Women for Sobriety:       (761) 859-3639       www.[employer].COGEON            [x] Narcotics Anonymous: (304) 784-7652 or [] Celebrate Recovery:       (815) 417-3709       Www.WheretogetilMoney-Wizards.[facility] (994) 739-6634         (elba based) www.echoecho    [] Cocaine Anonymous: (903) 463-1944 [] SMART Recovery:          (414) 167-5706       www.cawiNetlift.org          www.Restorius.org    [] Other:      [] Other:       Number of meetings to attend each week: 3    List 3 people or places you will call for support:    Name: Mom      Phone: In phone  Name: Daughter     Phone: In phone  Name: Sister      Phone: In phone    Aftercare:    My aftercare plan includes the following treatment:    Take medications as prescribed, attend meetings, IOP, PHP, or individual counseling    I agree to work on this recovery plan.    [facility]  Interdisciplinary Discharge Instructions    Date of Discharge: 3/28/2021                                      Ambulatory: Yes  Time of Discharge: 1:13 PM                                 Transportation: Family Member  Residence Upon Discharge: Home                        Telephone Number: [phone] (home)   Address: [address] [place] [id number]      Sharps / Valuables Returned: yes    Discharge Medications: yes    Discharge Prescriptions: yes    Instructions given by: RN    Patient's own medication returned: yes    Discharge Instructions:  Take medication per instruction. Attend aftercare appointments as scheduled. Seek psychiatric and/or emergency assistance before acting on dangerous impulses.    Written educational materials given: yes    This is your medication list at discharge from inpatient.       I have received instruction in these areas and have had an opportunity to ask questions, understand what has been discussed, and have received a copy of this form.      Patient Signature:_______________________________ Date:_____________         No

## 2021-11-11 NOTE — ED PROVIDER NOTE - OBJECTIVE STATEMENT
75 y/o female h/o myelodysplastic syndrome, HTN, anemia presents to ed c/o right tingling leg x 1 day without fever, trauma, saddle anesthesia, abd pain, dysuria, focal weakness. no ac use. pt had 2 units prbc. low plt, and anemia. pt had mri done 1 month ago and showed some kidney lesion with close surveillance

## 2021-11-11 NOTE — ED PROVIDER NOTE - PATIENT PORTAL LINK FT
You can access the FollowMyHealth Patient Portal offered by [facility] by registering at the following website: [email or web address]. By joining [employer]’s FollowMyHealth portal, you will also be able to view your health information using other applications (apps) compatible with our system.

## 2021-11-11 NOTE — ED ADULT NURSE REASSESSMENT NOTE - NS ED NURSE REASSESS COMMENT FT1
received pt.in bed at at 1910 pt.is awake and responsive.denies pain. 1st unit of PRBC in progress with no a/r noted

## 2021-11-11 NOTE — ED ADULT NURSE NOTE - ED STAT RN HANDOFF DETAILS
Patient remains stable hemodynamically, 1st unit RBCs started as ordered@ 18:56, no AR noted Patient will continue monitoring, endorsed to Makeda MARTINO.

## 2021-11-11 NOTE — ED ADULT TRIAGE NOTE - CCCP TRG CHIEF CMPLNT
[facility]  Wound Care  Progress Note    Problem List Items Addressed This Visit     Decubitus ulcer of left ischium, stage 4    Overview     Location: Left ischial region  Duration: Since 11/22/2015  Context: Decubitus ulcer of the left ischial region  Patient developed new wound on the right ischial region noted 1/9/2017. The wound was thought to be related to tubing from the wound vac and pressure. This wound healed  Associated Signs and Symptoms: Patient has no pain. He is insensate   Modifying Factors: The patient continues to smoke despite recommendations to stop. He has made no progress.  The wound vac was Utilized until January 9, 2017 on the left ischial region.  The patient completed 3 months therapy of antibiotic compound on 1/15/2018.  The patient sits from 5 am to 8 pm daily in a wheelchair.  Patient indicates he has not had any additional time of sitting.  On 416/2018, the patient was noted to have increased drainage and odor from his wound.  There was concern about infection and a culture was taken 4/16/2018 revealing methicillin sensitive staph aureus. Patient completed a course of Bactrim for 10 days.   Patient's wound noted to have more drainage and odor on 6/18/2018. Culture was taken to switch to topical antimicrobial to decrease colonization of wound bed. Topical antibiotic was not approved.  Repeat DNA analysis was performed of wound exudate and as of 12/10/2018.  Patient utilized a course of topical antibiotic starting 02/18/2019.  Patient recently started Dakin solution due to increased drainage and odor.  Drainage and odor has resolved as of 08/26/2019.  Patient was changed to silver dressing. Patient presented several weeks ago with recurrence drainage and odor.  Dakin's solution was restarted 9/ 9 /19.  The wound continues to improve on Dakin solution. There is no drainage or odor.  There is epithelialization at the wound edge.  The bone remains covered with no sign of  infection.         Current Assessment & Plan     Continue Dakin solution. We will go to every other day dressing change.  Patient to return in 2 weeks.               See wound doc progress notes. Documents will be scanned.        Manny SIMPSON Vann  [facility]   tingling on r leg since last night

## 2021-11-11 NOTE — ED PROVIDER NOTE - PROGRESS NOTE DETAILS
Vazquez: h/h low will need 3 units prior to dc. spoke with dr garner and agree. s/o to dr andres dmitry: transfusions completed. pt to be dc with her daughter. f/u heme/onc. return precautions discussed.

## 2021-11-11 NOTE — ED PROVIDER NOTE - NSFOLLOWUPINSTRUCTIONS_ED_ALL_ED_FT
[facility]                                                                                                                            Blood Transfusion, Adult, Care After      This sheet gives you information about how to care for yourself after your procedure. Your health care provider may also give you more specific instructions. If you have problems or questions, contact your health care provider.      What can I expect after the procedure?  After the procedure, it is common to have:  •Bruising and soreness where the IV was inserted.      •A fever or chills on the day of the procedure. This may be your body's response to the new blood cells received.      •A headache.        Follow these instructions at home:      IV insertion site care                 •Follow instructions from your health care provider about how to take care of your IV insertion site. Make sure you:  •Wash your hands with soap and water before and after you change your bandage (dressing). If soap and water are not available, use hand .      •Change your dressing as told by your health care provider.      •Check your IV insertion site every day for signs of infection. Check for:  •Redness, swelling, or pain.      •Bleeding from the site.      •Warmth.      •Pus or a bad smell.        General instructions     •Take over-the-counter and prescription medicines only as told by your health care provider.      •Rest as told by your health care provider.      •Return to your normal activities as told by your health care provider.      •Keep all follow-up visits as told by your health care provider. This is important.        Contact a health care provider if:    •You have itching or red, swollen areas of skin (hives).      •You feel anxious.      •You feel weak after doing your normal activities.      •You have redness, swelling, warmth, or pain around the IV insertion site.      •You have blood coming from the IV insertion site that does not stop with pressure.      •You have pus or a bad smell coming from your IV insertion site.        Get help right away if:  •You have symptoms of a serious allergic or immune system reaction, including:  •Trouble breathing or shortness of breath.      •Swelling of the face or feeling flushed.      •Fever or chills.      •Pain in the head, back, or chest.      •Dark urine or blood in the urine.      •Widespread rash.      •Fast heartbeat.      •Feeling dizzy or light-headed.        If you receive your blood transfusion in an outpatient setting, you will be told whom to contact to report any reactions.    These symptoms may represent a serious problem that is an emergency. Do not wait to see if the symptoms will go away. Get medical help right away. Call your local emergency services (911 in the [place] Do not drive yourself to the hospital.       Summary    •Bruising and tenderness around the IV insertion site are common.      •Check your IV insertion site every day for signs of infection.      •Rest as told by your health care provider. Return to your normal activities as told by your health care provider.      •Get help right away for symptoms of a serious allergic or immune system reaction to blood transfusion.      This information is not intended to replace advice given to you by your health care provider. Make sure you discuss any questions you have with your health care provider.      Document Revised: 06/11/2020 Document Reviewed: 06/11/2020    Elsevier Patient Education © 2021 Elsevier Inc.

## 2021-11-11 NOTE — ED PROVIDER NOTE - CLINICAL SUMMARY MEDICAL DECISION MAKING FREE TEXT BOX
75 y/o female h/o myelodysplastic syndrome, HTN, anemia presents to ed c/o right tingling leg x 1 day without fever, trauma, saddle anesthesia, abd pain, dysuria, focal weakness. no ac use. pt had 2 units prbc. low plt, and anemia. pt had mri done 1 month ago and showed some kidney lesion with close surveillance    anemia, labs, xr lumbar., no sign of cord compression or infectious cause. r/o path fx. will transfuse and dc

## 2021-11-11 NOTE — ED ADULT NURSE NOTE - OBJECTIVE STATEMENT
Patient presents with c/o tingling sensation to RLE x yesterday, also reports lightheadedness. Patient denies fever/chills, back pain or chest pain patient on chemotherapy last Tx 11/05/21

## 2021-11-12 VITALS
RESPIRATION RATE: 18 BRPM | SYSTOLIC BLOOD PRESSURE: 145 MMHG | OXYGEN SATURATION: 99 % | DIASTOLIC BLOOD PRESSURE: 72 MMHG | TEMPERATURE: 98 F | HEART RATE: 73 BPM

## 2021-11-12 PROCEDURE — 80053 COMPREHEN METABOLIC PANEL: CPT

## 2021-11-12 PROCEDURE — 86850 RBC ANTIBODY SCREEN: CPT

## 2021-11-12 PROCEDURE — 85025 COMPLETE CBC W/AUTO DIFF WBC: CPT

## 2021-11-12 PROCEDURE — 36415 COLL VENOUS BLD VENIPUNCTURE: CPT

## 2021-11-12 PROCEDURE — 36430 TRANSFUSION BLD/BLD COMPNT: CPT

## 2021-11-12 PROCEDURE — 72100 X-RAY EXAM L-S SPINE 2/3 VWS: CPT

## 2021-11-12 PROCEDURE — 86900 BLOOD TYPING SEROLOGIC ABO: CPT

## 2021-11-12 PROCEDURE — 86923 COMPATIBILITY TEST ELECTRIC: CPT

## 2021-11-12 PROCEDURE — P9040: CPT

## 2021-11-12 PROCEDURE — 99285 EMERGENCY DEPT VISIT HI MDM: CPT | Mod: 25

## 2021-11-12 PROCEDURE — 86901 BLOOD TYPING SEROLOGIC RH(D): CPT

## 2021-11-12 NOTE — ED ADULT NURSE REASSESSMENT NOTE - NS ED NURSE REASSESS COMMENT FT1
pt.remained  stable. 3rd unit of prbc completed at 0440 with no a/r noted. re-eval by dr. andres with order to d/c home.left in the ed in stable condition. not in distress

## 2022-03-19 ENCOUNTER — EMERGENCY (EMERGENCY)
Facility: HOSPITAL | Age: 77
LOS: 1 days | Discharge: HOME | End: 2022-03-19
Attending: STUDENT IN AN ORGANIZED HEALTH CARE EDUCATION/TRAINING PROGRAM
Payer: MEDICARE

## 2022-03-19 VITALS
HEART RATE: 86 BPM | SYSTOLIC BLOOD PRESSURE: 133 MMHG | OXYGEN SATURATION: 98 % | TEMPERATURE: 99 F | RESPIRATION RATE: 18 BRPM | DIASTOLIC BLOOD PRESSURE: 68 MMHG

## 2022-03-19 VITALS
HEART RATE: 69 BPM | SYSTOLIC BLOOD PRESSURE: 113 MMHG | HEIGHT: 64 IN | RESPIRATION RATE: 18 BRPM | TEMPERATURE: 98 F | DIASTOLIC BLOOD PRESSURE: 50 MMHG | WEIGHT: 141.98 LBS | OXYGEN SATURATION: 98 %

## 2022-03-19 LAB
ALBUMIN SERPL ELPH-MCNC: 3 G/DL — LOW (ref 3.5–5)
ALP SERPL-CCNC: 64 U/L — SIGNIFICANT CHANGE UP (ref 40–120)
ALT FLD-CCNC: 17 U/L DA — SIGNIFICANT CHANGE UP (ref 10–60)
ANION GAP SERPL CALC-SCNC: 8 MMOL/L — SIGNIFICANT CHANGE UP (ref 5–17)
ANISOCYTOSIS BLD QL: SLIGHT — SIGNIFICANT CHANGE UP
APTT BLD: 27.8 SEC — SIGNIFICANT CHANGE UP (ref 27.5–35.5)
AST SERPL-CCNC: 31 U/L — SIGNIFICANT CHANGE UP (ref 10–40)
BASOPHILS # BLD AUTO: 0 K/UL — SIGNIFICANT CHANGE UP (ref 0–0.2)
BASOPHILS NFR BLD AUTO: 0 % — SIGNIFICANT CHANGE UP (ref 0–2)
BILIRUB SERPL-MCNC: 0.5 MG/DL — SIGNIFICANT CHANGE UP (ref 0.2–1.2)
BLD GP AB SCN SERPL QL: SIGNIFICANT CHANGE UP
BUN SERPL-MCNC: 38 MG/DL — HIGH (ref 7–18)
CALCIUM SERPL-MCNC: 9 MG/DL — SIGNIFICANT CHANGE UP (ref 8.4–10.5)
CHLORIDE SERPL-SCNC: 104 MMOL/L — SIGNIFICANT CHANGE UP (ref 96–108)
CO2 SERPL-SCNC: 24 MMOL/L — SIGNIFICANT CHANGE UP (ref 22–31)
CREAT SERPL-MCNC: 1.2 MG/DL — SIGNIFICANT CHANGE UP (ref 0.5–1.3)
EGFR: 47 ML/MIN/1.73M2 — LOW
EOSINOPHIL # BLD AUTO: 0.01 K/UL — SIGNIFICANT CHANGE UP (ref 0–0.5)
EOSINOPHIL NFR BLD AUTO: 0.3 % — SIGNIFICANT CHANGE UP (ref 0–6)
GLUCOSE SERPL-MCNC: 134 MG/DL — HIGH (ref 70–99)
HCT VFR BLD CALC: 18.3 % — CRITICAL LOW (ref 34.5–45)
HGB BLD-MCNC: 5.6 G/DL — CRITICAL LOW (ref 11.5–15.5)
IMM GRANULOCYTES NFR BLD AUTO: 1.6 % — HIGH (ref 0–1.5)
INR BLD: 0.98 RATIO — SIGNIFICANT CHANGE UP (ref 0.88–1.16)
LYMPHOCYTES # BLD AUTO: 0.85 K/UL — LOW (ref 1–3.3)
LYMPHOCYTES # BLD AUTO: 22.7 % — SIGNIFICANT CHANGE UP (ref 13–44)
MACROCYTES BLD QL: SLIGHT — SIGNIFICANT CHANGE UP
MANUAL SMEAR VERIFICATION: SIGNIFICANT CHANGE UP
MCHC RBC-ENTMCNC: 30.6 GM/DL — LOW (ref 32–36)
MCHC RBC-ENTMCNC: 31.6 PG — SIGNIFICANT CHANGE UP (ref 27–34)
MCV RBC AUTO: 103.4 FL — HIGH (ref 80–100)
MONOCYTES # BLD AUTO: 0.19 K/UL — SIGNIFICANT CHANGE UP (ref 0–0.9)
MONOCYTES NFR BLD AUTO: 5.1 % — SIGNIFICANT CHANGE UP (ref 2–14)
NEUTROPHILS # BLD AUTO: 2.63 K/UL — SIGNIFICANT CHANGE UP (ref 1.8–7.4)
NEUTROPHILS NFR BLD AUTO: 70.3 % — SIGNIFICANT CHANGE UP (ref 43–77)
NRBC # BLD: 0 /100 WBCS — SIGNIFICANT CHANGE UP (ref 0–0)
PLAT MORPH BLD: NORMAL — SIGNIFICANT CHANGE UP
PLATELET # BLD AUTO: 31 K/UL — LOW (ref 150–400)
POIKILOCYTOSIS BLD QL AUTO: SLIGHT — SIGNIFICANT CHANGE UP
POLYCHROMASIA BLD QL SMEAR: SLIGHT — SIGNIFICANT CHANGE UP
POTASSIUM SERPL-MCNC: 5.1 MMOL/L — SIGNIFICANT CHANGE UP (ref 3.5–5.3)
POTASSIUM SERPL-SCNC: 5.1 MMOL/L — SIGNIFICANT CHANGE UP (ref 3.5–5.3)
PROT SERPL-MCNC: 6.3 G/DL — SIGNIFICANT CHANGE UP (ref 6–8.3)
PROTHROM AB SERPL-ACNC: 11.6 SEC — SIGNIFICANT CHANGE UP (ref 10.5–13.4)
RBC # BLD: 1.77 M/UL — LOW (ref 3.8–5.2)
RBC # FLD: 18 % — HIGH (ref 10.3–14.5)
RBC BLD AUTO: ABNORMAL
SODIUM SERPL-SCNC: 136 MMOL/L — SIGNIFICANT CHANGE UP (ref 135–145)
WBC # BLD: 3.74 K/UL — LOW (ref 3.8–10.5)
WBC # FLD AUTO: 3.74 K/UL — LOW (ref 3.8–10.5)

## 2022-03-19 PROCEDURE — 86923 COMPATIBILITY TEST ELECTRIC: CPT

## 2022-03-19 PROCEDURE — 85730 THROMBOPLASTIN TIME PARTIAL: CPT

## 2022-03-19 PROCEDURE — 99285 EMERGENCY DEPT VISIT HI MDM: CPT | Mod: 25

## 2022-03-19 PROCEDURE — 86850 RBC ANTIBODY SCREEN: CPT

## 2022-03-19 PROCEDURE — 86900 BLOOD TYPING SEROLOGIC ABO: CPT

## 2022-03-19 PROCEDURE — 99284 EMERGENCY DEPT VISIT MOD MDM: CPT

## 2022-03-19 PROCEDURE — 85610 PROTHROMBIN TIME: CPT

## 2022-03-19 PROCEDURE — 85025 COMPLETE CBC W/AUTO DIFF WBC: CPT

## 2022-03-19 PROCEDURE — 36415 COLL VENOUS BLD VENIPUNCTURE: CPT

## 2022-03-19 PROCEDURE — 36430 TRANSFUSION BLD/BLD COMPNT: CPT

## 2022-03-19 PROCEDURE — 80053 COMPREHEN METABOLIC PANEL: CPT

## 2022-03-19 PROCEDURE — P9040: CPT

## 2022-03-19 PROCEDURE — 86901 BLOOD TYPING SEROLOGIC RH(D): CPT

## 2022-03-19 NOTE — ED PROVIDER NOTE - NS ED ROS FT
ROS:  GENERAL: +fatigue. No fever, no chills  EYES: no change in vision  HEENT: no trouble swallowing, no trouble speaking  CARDIAC: no chest pain  PULMONARY: no cough, no shortness of breath  GI: no abdominal pain, no nausea, no vomiting, no diarrhea, no constipation  : No dysuria, no frequency, no change in appearance, or odor of urine  SKIN: no rashes  NEURO: no headache, no weakness  MSK: No joint pain    Jackson Casanova, DO

## 2022-03-19 NOTE — ED PROVIDER NOTE - NSFOLLOWUPINSTRUCTIONS_ED_ALL_ED_FT
Follow up with your PCP in 24-48 hours.   May take Tylenol as directed on the bottle for pain control.   Return to the ER if you develop any new or worsening symptoms such as chest pain, shortness of breath, numbness, weakness, abdominal pain, nausea, vomiting, or visual changes.

## 2022-03-19 NOTE — ED PROVIDER NOTE - CLINICAL SUMMARY MEDICAL DECISION MAKING FREE TEXT BOX
76F with PMH including HTN, anemia, and MDD p/w anemia on routine outpatient labs drawn yesterday (hgb 6). Sent in by Dr. Mosley for 2U PRBC. Pt endorses fatigue but denies any other symptoms including chest pain, SOB, abd pain, N/V/D, melena, hematochezia. Pt has required transfusions in the past for her anemia. No history of transfusion reactions. Pt well appearing, no bleeding. Will assess hgb and transfuse as necessary.

## 2022-03-19 NOTE — ED PROVIDER NOTE - OBJECTIVE STATEMENT
76F with PMH including HTN, anemia, and MDD p/w anemia on routine outpatient labs drawn yesterday (hgb 6). Sent in by Dr. Mosley for 2U PRBC. Pt endorses fatigue but denies any other symptoms including chest pain, SOB, abd pain, N/V/D, melena, hematochezia. Pt has required transfusions in the past for her anemia. No history of transfusion reactions.

## 2022-03-19 NOTE — ED PROVIDER NOTE - PATIENT PORTAL LINK FT
You can access the FollowMyHealth Patient Portal offered by [facility] by registering at the following website: [email or web address]. By joining [employer]’s FollowMyHealth portal, you will also be able to view your health information using other applications (apps) compatible with our system.

## 2022-04-16 ENCOUNTER — EMERGENCY (EMERGENCY)
Facility: HOSPITAL | Age: 77
LOS: 1 days | Discharge: HOME | End: 2022-04-16
Attending: EMERGENCY MEDICINE
Payer: MEDICARE

## 2022-04-16 VITALS
SYSTOLIC BLOOD PRESSURE: 144 MMHG | OXYGEN SATURATION: 98 % | WEIGHT: 143.74 LBS | HEART RATE: 84 BPM | RESPIRATION RATE: 16 BRPM | DIASTOLIC BLOOD PRESSURE: 76 MMHG | TEMPERATURE: 98 F | HEIGHT: 64 IN

## 2022-04-16 VITALS
RESPIRATION RATE: 16 BRPM | OXYGEN SATURATION: 97 % | SYSTOLIC BLOOD PRESSURE: 128 MMHG | TEMPERATURE: 98 F | HEART RATE: 73 BPM | DIASTOLIC BLOOD PRESSURE: 72 MMHG

## 2022-04-16 LAB
ALBUMIN SERPL ELPH-MCNC: 3.3 G/DL — LOW (ref 3.5–5)
ALP SERPL-CCNC: 63 U/L — SIGNIFICANT CHANGE UP (ref 40–120)
ALT FLD-CCNC: 19 U/L DA — SIGNIFICANT CHANGE UP (ref 10–60)
ANION GAP SERPL CALC-SCNC: 10 MMOL/L — SIGNIFICANT CHANGE UP (ref 5–17)
ANISOCYTOSIS BLD QL: SLIGHT — SIGNIFICANT CHANGE UP
AST SERPL-CCNC: 14 U/L — SIGNIFICANT CHANGE UP (ref 10–40)
BASOPHILS # BLD AUTO: 0 K/UL — SIGNIFICANT CHANGE UP (ref 0–0.2)
BASOPHILS NFR BLD AUTO: 0 % — SIGNIFICANT CHANGE UP (ref 0–2)
BILIRUB SERPL-MCNC: 0.7 MG/DL — SIGNIFICANT CHANGE UP (ref 0.2–1.2)
BLD GP AB SCN SERPL QL: SIGNIFICANT CHANGE UP
BUN SERPL-MCNC: 32 MG/DL — HIGH (ref 7–18)
CALCIUM SERPL-MCNC: 9.3 MG/DL — SIGNIFICANT CHANGE UP (ref 8.4–10.5)
CHLORIDE SERPL-SCNC: 104 MMOL/L — SIGNIFICANT CHANGE UP (ref 96–108)
CO2 SERPL-SCNC: 23 MMOL/L — SIGNIFICANT CHANGE UP (ref 22–31)
CREAT SERPL-MCNC: 1.31 MG/DL — HIGH (ref 0.5–1.3)
EGFR: 42 ML/MIN/1.73M2 — LOW
EOSINOPHIL # BLD AUTO: 0 K/UL — SIGNIFICANT CHANGE UP (ref 0–0.5)
EOSINOPHIL NFR BLD AUTO: 0 % — SIGNIFICANT CHANGE UP (ref 0–6)
GLUCOSE SERPL-MCNC: 95 MG/DL — SIGNIFICANT CHANGE UP (ref 70–99)
HCT VFR BLD CALC: 21.9 % — LOW (ref 34.5–45)
HGB BLD-MCNC: 6.7 G/DL — CRITICAL LOW (ref 11.5–15.5)
LYMPHOCYTES # BLD AUTO: 1.58 K/UL — SIGNIFICANT CHANGE UP (ref 1–3.3)
LYMPHOCYTES # BLD AUTO: 35 % — SIGNIFICANT CHANGE UP (ref 13–44)
MACROCYTES BLD QL: SLIGHT — SIGNIFICANT CHANGE UP
MANUAL SMEAR VERIFICATION: SIGNIFICANT CHANGE UP
MCHC RBC-ENTMCNC: 30.6 GM/DL — LOW (ref 32–36)
MCHC RBC-ENTMCNC: 30.7 PG — SIGNIFICANT CHANGE UP (ref 27–34)
MCV RBC AUTO: 100.5 FL — HIGH (ref 80–100)
MICROCYTES BLD QL: SLIGHT — SIGNIFICANT CHANGE UP
MONOCYTES # BLD AUTO: 0.27 K/UL — SIGNIFICANT CHANGE UP (ref 0–0.9)
MONOCYTES NFR BLD AUTO: 6 % — SIGNIFICANT CHANGE UP (ref 2–14)
NEUTROPHILS # BLD AUTO: 2.66 K/UL — SIGNIFICANT CHANGE UP (ref 1.8–7.4)
NEUTROPHILS NFR BLD AUTO: 59 % — SIGNIFICANT CHANGE UP (ref 43–77)
NRBC # BLD: 0 /100 — SIGNIFICANT CHANGE UP (ref 0–0)
OVALOCYTES BLD QL SMEAR: SLIGHT — SIGNIFICANT CHANGE UP
PLAT MORPH BLD: NORMAL — SIGNIFICANT CHANGE UP
PLATELET # BLD AUTO: 20 K/UL — CRITICAL LOW (ref 150–400)
POIKILOCYTOSIS BLD QL AUTO: SLIGHT — SIGNIFICANT CHANGE UP
POLYCHROMASIA BLD QL SMEAR: SLIGHT — SIGNIFICANT CHANGE UP
POTASSIUM SERPL-MCNC: 4.5 MMOL/L — SIGNIFICANT CHANGE UP (ref 3.5–5.3)
POTASSIUM SERPL-SCNC: 4.5 MMOL/L — SIGNIFICANT CHANGE UP (ref 3.5–5.3)
PROT SERPL-MCNC: 6.4 G/DL — SIGNIFICANT CHANGE UP (ref 6–8.3)
RBC # BLD: 2.18 M/UL — LOW (ref 3.8–5.2)
RBC # FLD: 18.3 % — HIGH (ref 10.3–14.5)
RBC BLD AUTO: ABNORMAL
SODIUM SERPL-SCNC: 137 MMOL/L — SIGNIFICANT CHANGE UP (ref 135–145)
WBC # BLD: 4.51 K/UL — SIGNIFICANT CHANGE UP (ref 3.8–10.5)
WBC # FLD AUTO: 4.51 K/UL — SIGNIFICANT CHANGE UP (ref 3.8–10.5)

## 2022-04-16 PROCEDURE — 86850 RBC ANTIBODY SCREEN: CPT

## 2022-04-16 PROCEDURE — 86923 COMPATIBILITY TEST ELECTRIC: CPT

## 2022-04-16 PROCEDURE — 80053 COMPREHEN METABOLIC PANEL: CPT

## 2022-04-16 PROCEDURE — 86900 BLOOD TYPING SEROLOGIC ABO: CPT

## 2022-04-16 PROCEDURE — 36415 COLL VENOUS BLD VENIPUNCTURE: CPT

## 2022-04-16 PROCEDURE — P9040: CPT

## 2022-04-16 PROCEDURE — 86901 BLOOD TYPING SEROLOGIC RH(D): CPT

## 2022-04-16 PROCEDURE — 99284 EMERGENCY DEPT VISIT MOD MDM: CPT

## 2022-04-16 PROCEDURE — 36430 TRANSFUSION BLD/BLD COMPNT: CPT

## 2022-04-16 PROCEDURE — 99285 EMERGENCY DEPT VISIT HI MDM: CPT | Mod: 25

## 2022-04-16 PROCEDURE — 85025 COMPLETE CBC W/AUTO DIFF WBC: CPT

## 2022-04-16 NOTE — ED ADULT NURSE NOTE - NS ED NURSE LEVEL OF CONSCIOUSNESS MENTAL STATUS
Patient advised of MD recommendations and patient states she will continue to take the provera daily for the time being. Patient verbalized understanding. Awake/Alert

## 2022-04-16 NOTE — ED PROVIDER NOTE - OBJECTIVE STATEMENT
77 y/o female, history of MDS, on Epogen, cyclosporine, and steroids, hypertension, pancytopenia, coming in for low H&H, 6.6; normally 7-8. Platelets 21. Done 1 day ago. No acute symptoms. No acute blood loss.

## 2022-04-16 NOTE — ED PROVIDER NOTE - PROGRESS NOTE DETAILS
Vazquez: h/h 6.7- plt remains at 20. wbc improve no longer leukopenia. pt comfortably.  dx anemia requiring blood transfusion due to MDS. see dr Mosley. left ambulatory.  return precautions given.

## 2022-04-16 NOTE — ED PROVIDER NOTE - PATIENT PORTAL LINK FT
You can access the FollowMyHealth Patient Portal offered by [facility] by registering at the following website: [email or web address]. By joining [employer]’s FollowMyHealth portal, you will also be able to view your health information using other applications (apps) compatible with our system.

## 2022-04-16 NOTE — ED ADULT NURSE NOTE - OBJECTIVE STATEMENT
Pt was referred from Hematology for blood transfusion  Last blood transfusion 1 month ago  Denies any symptoms  other than feeling tired  No chest pain, no difficulty breathing

## 2022-04-28 ENCOUNTER — EMERGENCY (EMERGENCY)
Facility: HOSPITAL | Age: 77
LOS: 1 days | Discharge: HOME | End: 2022-04-28
Attending: EMERGENCY MEDICINE
Payer: MEDICARE

## 2022-04-28 VITALS
HEIGHT: 64 IN | OXYGEN SATURATION: 98 % | DIASTOLIC BLOOD PRESSURE: 60 MMHG | HEART RATE: 80 BPM | SYSTOLIC BLOOD PRESSURE: 90 MMHG | WEIGHT: 141.1 LBS | TEMPERATURE: 98 F | RESPIRATION RATE: 17 BRPM

## 2022-04-28 LAB
ALBUMIN SERPL ELPH-MCNC: 3 G/DL — LOW (ref 3.5–5)
ALP SERPL-CCNC: 57 U/L — SIGNIFICANT CHANGE UP (ref 40–120)
ALT FLD-CCNC: 17 U/L DA — SIGNIFICANT CHANGE UP (ref 10–60)
ANION GAP SERPL CALC-SCNC: 11 MMOL/L — SIGNIFICANT CHANGE UP (ref 5–17)
AST SERPL-CCNC: 17 U/L — SIGNIFICANT CHANGE UP (ref 10–40)
BASOPHILS # BLD AUTO: 0 K/UL — SIGNIFICANT CHANGE UP (ref 0–0.2)
BASOPHILS NFR BLD AUTO: 0 % — SIGNIFICANT CHANGE UP (ref 0–2)
BILIRUB SERPL-MCNC: 0.7 MG/DL — SIGNIFICANT CHANGE UP (ref 0.2–1.2)
BUN SERPL-MCNC: 45 MG/DL — HIGH (ref 7–18)
CALCIUM SERPL-MCNC: 9 MG/DL — SIGNIFICANT CHANGE UP (ref 8.4–10.5)
CHLORIDE SERPL-SCNC: 103 MMOL/L — SIGNIFICANT CHANGE UP (ref 96–108)
CO2 SERPL-SCNC: 23 MMOL/L — SIGNIFICANT CHANGE UP (ref 22–31)
CREAT SERPL-MCNC: 1.17 MG/DL — SIGNIFICANT CHANGE UP (ref 0.5–1.3)
EGFR: 49 ML/MIN/1.73M2 — LOW
EOSINOPHIL # BLD AUTO: 0 K/UL — SIGNIFICANT CHANGE UP (ref 0–0.5)
EOSINOPHIL NFR BLD AUTO: 0 % — SIGNIFICANT CHANGE UP (ref 0–6)
GLUCOSE SERPL-MCNC: 104 MG/DL — HIGH (ref 70–99)
HCT VFR BLD CALC: 17.7 % — CRITICAL LOW (ref 34.5–45)
HGB BLD-MCNC: 5.4 G/DL — CRITICAL LOW (ref 11.5–15.5)
LACTATE SERPL-SCNC: 1.6 MMOL/L — SIGNIFICANT CHANGE UP (ref 0.7–2)
LYMPHOCYTES # BLD AUTO: 1.27 K/UL — SIGNIFICANT CHANGE UP (ref 1–3.3)
LYMPHOCYTES # BLD AUTO: 24 % — SIGNIFICANT CHANGE UP (ref 13–44)
MCHC RBC-ENTMCNC: 30.2 PG — SIGNIFICANT CHANGE UP (ref 27–34)
MCHC RBC-ENTMCNC: 30.5 GM/DL — LOW (ref 32–36)
MCV RBC AUTO: 98.9 FL — SIGNIFICANT CHANGE UP (ref 80–100)
MONOCYTES # BLD AUTO: 0.37 K/UL — SIGNIFICANT CHANGE UP (ref 0–0.9)
MONOCYTES NFR BLD AUTO: 7 % — SIGNIFICANT CHANGE UP (ref 2–14)
NEUTROPHILS # BLD AUTO: 3.5 K/UL — SIGNIFICANT CHANGE UP (ref 1.8–7.4)
NEUTROPHILS NFR BLD AUTO: 66 % — SIGNIFICANT CHANGE UP (ref 43–77)
PLATELET # BLD AUTO: 20 K/UL — CRITICAL LOW (ref 150–400)
POTASSIUM SERPL-MCNC: 4.5 MMOL/L — SIGNIFICANT CHANGE UP (ref 3.5–5.3)
POTASSIUM SERPL-SCNC: 4.5 MMOL/L — SIGNIFICANT CHANGE UP (ref 3.5–5.3)
PROT SERPL-MCNC: 6.1 G/DL — SIGNIFICANT CHANGE UP (ref 6–8.3)
RBC # BLD: 1.79 M/UL — LOW (ref 3.8–5.2)
RBC # FLD: 20.2 % — HIGH (ref 10.3–14.5)
SODIUM SERPL-SCNC: 137 MMOL/L — SIGNIFICANT CHANGE UP (ref 135–145)
TROPONIN I, HIGH SENSITIVITY RESULT: 19.2 NG/L — SIGNIFICANT CHANGE UP
WBC # BLD: 5.3 K/UL — SIGNIFICANT CHANGE UP (ref 3.8–10.5)
WBC # FLD AUTO: 5.3 K/UL — SIGNIFICANT CHANGE UP (ref 3.8–10.5)

## 2022-04-28 PROCEDURE — 71045 X-RAY EXAM CHEST 1 VIEW: CPT | Mod: 26

## 2022-04-28 PROCEDURE — 99285 EMERGENCY DEPT VISIT HI MDM: CPT

## 2022-04-28 RX ORDER — SODIUM CHLORIDE 9 MG/ML
1000 INJECTION INTRAMUSCULAR; INTRAVENOUS; SUBCUTANEOUS ONCE
Refills: 0 | Status: COMPLETED | OUTPATIENT
Start: 2022-04-28 | End: 2022-04-28

## 2022-04-28 RX ORDER — SODIUM CHLORIDE 9 MG/ML
1000 INJECTION INTRAMUSCULAR; INTRAVENOUS; SUBCUTANEOUS ONCE
Refills: 0 | Status: DISCONTINUED | OUTPATIENT
Start: 2022-04-28 | End: 2022-04-28

## 2022-04-28 RX ADMIN — SODIUM CHLORIDE 1000 MILLILITER(S): 9 INJECTION INTRAMUSCULAR; INTRAVENOUS; SUBCUTANEOUS at 20:15

## 2022-04-28 RX ADMIN — SODIUM CHLORIDE 1000 MILLILITER(S): 9 INJECTION INTRAMUSCULAR; INTRAVENOUS; SUBCUTANEOUS at 19:44

## 2022-04-28 NOTE — ED PROVIDER NOTE - CLINICAL SUMMARY MEDICAL DECISION MAKING FREE TEXT BOX
Elderly F with MDS and hypotension. Denies any bleeding. Possible underlying sepsis. Plan - labs, UA, CXR, IVF, likely transfuse and admit.

## 2022-04-28 NOTE — ED PROVIDER NOTE - PHYSICAL EXAMINATION
GENERAL: well appearing, no acute distress   HEAD: atraumatic   EYES: EOMI   ENT: moist oral mucosa   CARDIAC: regular rate  RESPIRATORY: no increased work of breathing   MUSCULOSKELETAL: no deformity   NEUROLOGICAL: alert, spontaneous movement of extremities   SKIN: petechiae to arms   PSYCHIATRIC: cooperative

## 2022-04-28 NOTE — ED PROVIDER NOTE - OBJECTIVE STATEMENT
75 yo F pmh of MDS, HTN, presents from Dr Mosley's office with Hgb 5 and c/o generalized weakness today. Denies other acute complaints.

## 2022-04-28 NOTE — ED PROVIDER NOTE - PATIENT PORTAL LINK FT
You can access the FollowMyHealth Patient Portal offered by [facility] by registering at the following website: [email or web address]. By joining [employer]’s FollowMyHealth portal, you will also be able to view your health information using other applications (apps) compatible with our system.

## 2022-04-28 NOTE — ED PROVIDER NOTE - IV ALTEPLASE ADMIN OUTSIDE HIDDEN
"Pt was called and informed of MD note below. Pt states "" I will make an appointment to see Dr. Sergey Espinosa. I believe they could get me in on the 17th next month. \""     Pt is taking flecainide BID. Please confirm #30 or #60. Thank you.    " show

## 2022-04-28 NOTE — ED PROVIDER NOTE - NSFOLLOWUPINSTRUCTIONS_ED_ALL_ED_FT
[facility]aditional [facility]                                                                                                                                   Blood Transfusion, Adult      A blood transfusion is a procedure in which you receive blood or a type of blood cell (blood component) through an IV. You may need a blood transfusion when your blood level is low. This may result from a bleeding disorder, illness, injury, or surgery. The blood may come from a donor. You may also be able to donate blood for yourself (autologous blood donation) before a planned surgery.    The blood given in a transfusion is made up of different blood components. You may receive:  •Red blood cells. These carry oxygen to the cells in the body.      •Platelets. These help your blood to clot.      •Plasma. This is the liquid part of your blood. It carries proteins and other substances throughout the body.      •White blood cells. These help you fight infections.      If you have hemophilia or another clotting disorder, you may also receive other types of blood products.      Tell a health care provider about:    •Any blood disorders you have.      •Any previous reactions you have had during a blood transfusion.      •Any allergies you have.      •All medicines you are taking, including vitamins, herbs, eye drops, creams, and over-the-counter medicines.      •Any surgeries you have had.      •Any medical conditions you have, including any recent fever or cold symptoms.      •Whether you are pregnant or may be pregnant.        What are the risks?    Generally, this is a safe procedure. However, problems may occur.•The most common problems include:  •A mild allergic reaction, such as red, swollen areas of skin (hives) and itching.      •Fever or chills. This may be the body's response to new blood cells received. This may occur during or up to 4 hours after the transfusion.      •More serious problems may include:  •Transfusion-associated circulatory overload (TACO), or too much fluid in the lungs. This may cause breathing problems.      •A serious allergic reaction, such as difficulty breathing or swelling around the face and lips.      •Transfusion-related acute lung injury (TRALI), which causes breathing difficulty and low oxygen in the blood. This can occur within hours of the transfusion or several days later.      •Iron overload. This can happen after receiving many blood transfusions over a period of time.      •Infection or virus being transmitted. This is rare because donated blood is carefully tested before it is given.      •Hemolytic transfusion reaction. This is rare. It happens when your body's defense system (immune system)tries to attack the new blood cells. Symptoms may include fever, chills, nausea, low blood pressure, and low back or chest pain.      •Transfusion-associated mkpee-xfwckl-skhx disease (TAGVHD). This is rare. It happens when donated cells attack your body's healthy tissues.          What happens before the procedure?    Medicines     Ask your health care provider about:  •Changing or stopping your regular medicines. This is especially important if you are taking diabetes medicines or blood thinners.      •Taking medicines such as aspirin and ibuprofen. These medicines can thin your blood. Do not take these medicines unless your health care provider tells you to take them.      •Taking over-the-counter medicines, vitamins, herbs, and supplements.      General instructions    •Follow instructions from your health care provider about eating and drinking restrictions.      •You will have a blood test to determine your blood type. This is necessary to know what kind of blood your body will accept and to match it to the donor blood.      •If you are going to have a planned surgery, you may be able to do an autologous blood donation. This may be done in case you need to have a transfusion.      •You will have your temperature, blood pressure, and pulse monitored before the transfusion.      •If you have had an allergic reaction to a transfusion in the past, you may be given medicine to help prevent a reaction. This medicine may be given to you by mouth (orally) or through an IV.      •Set aside time for the blood transfusion. This procedure generally takes 1–4 hours to complete.        What happens during the procedure?  Intravenous approach   •An IV will be inserted into one of your veins.      •The bag of donated blood will be attached to your IV. The blood will then enter through your vein.      •Your temperature, blood pressure, and pulse will be monitored regularly during the transfusion. This monitoring is done to detect early signs of a transfusion reaction.    •Tell your nurse right away if you have any of these symptoms during the transfusion:  •Shortness of breath or trouble breathing.      •Chest or back pain.      •Fever or chills.      •Hives or itching.        •If you have any signs or symptoms of a reaction, your transfusion will be stopped and you may be given medicine.      •When the transfusion is complete, your IV will be removed.      •Pressure may be applied to the IV site for a few minutes.      •A bandage (dressing)will be applied.      The procedure may vary among health care providers and hospitals.      What happens after the procedure?    •Your temperature, blood pressure, pulse, breathing rate, and blood oxygen level will be monitored until you leave the hospital or clinic.      •Your blood may be tested to see how you are responding to the transfusion.      •You may be warmed with fluids or blankets to maintain a normal body temperature.      •If you receive your blood transfusion in an outpatient setting, you will be told whom to contact to report any reactions.        Where to find more information    For more information on blood transfusions, visit the [employer]: [facility]      Summary    •A blood transfusion is a procedure in which you receive blood or a type of blood cell (blood component) through an IV.      •The blood you receive may come from a donor or be donated by yourself (autologous blood donation) before a planned surgery.      •The blood given in a transfusion is made up of different blood components. You may receive red blood cells, platelets, plasma, or white blood cells depending on the condition treated.      •Your temperature, blood pressure, and pulse will be monitored before, during, and after the transfusion.      •After the transfusion, your blood may be tested to see how your body has responded.      This information is not intended to replace advice given to you by your health care provider. Make sure you discuss any questions you have with your health care provider.      Document Revised: 10/22/2020 Document Reviewed: 06/11/2020    [employer] Patient Education © 2022 [employer]

## 2022-04-28 NOTE — ED PROVIDER NOTE - NS ED ROS FT
CONSTITUTIONAL: no fever, +weakness   EYES: no eye pain   ENMT: no throat pain  CARDIOVASCULAR: no chest pain   RESPIRATORY: no shortness of breath   GASTROINTESTINAL: no abdominal pain   GENITOURINARY: no dysuria   SKIN: no rashes  NEUROLOGICAL: no headache

## 2022-04-28 NOTE — ED PROVIDER NOTE - PROGRESS NOTE DETAILS
labs - WBC 1.7, Hgb 5.4, plt 20  Spoke with Dr Mosley who rec transfusion of 2 units PRBCs  Pt consented and consent placed in pt's chart; risks and benefits discussed   Pt's BP was initially low, but responded to fluids, latest 166 systolic. Offered pt admission but she declined. Will sign out to Dr Medina to fu transfusion and anticipate dc if no further hypotension. Carol: feels improved. vitals stable. will dc. /fu with Dr. Mosley. return precautions discussed.

## 2022-04-29 VITALS
TEMPERATURE: 98 F | SYSTOLIC BLOOD PRESSURE: 144 MMHG | RESPIRATION RATE: 18 BRPM | DIASTOLIC BLOOD PRESSURE: 83 MMHG | HEART RATE: 85 BPM | OXYGEN SATURATION: 97 %

## 2022-04-29 PROBLEM — D61.818 OTHER PANCYTOPENIA: Chronic | Status: ACTIVE | Noted: 2022-04-16

## 2022-04-29 PROCEDURE — 96360 HYDRATION IV INFUSION INIT: CPT

## 2022-04-29 PROCEDURE — 86900 BLOOD TYPING SEROLOGIC ABO: CPT

## 2022-04-29 PROCEDURE — 86923 COMPATIBILITY TEST ELECTRIC: CPT

## 2022-04-29 PROCEDURE — P9040: CPT

## 2022-04-29 PROCEDURE — 84484 ASSAY OF TROPONIN QUANT: CPT

## 2022-04-29 PROCEDURE — 36415 COLL VENOUS BLD VENIPUNCTURE: CPT

## 2022-04-29 PROCEDURE — 86850 RBC ANTIBODY SCREEN: CPT

## 2022-04-29 PROCEDURE — 87040 BLOOD CULTURE FOR BACTERIA: CPT

## 2022-04-29 PROCEDURE — 86901 BLOOD TYPING SEROLOGIC RH(D): CPT

## 2022-04-29 PROCEDURE — 80053 COMPREHEN METABOLIC PANEL: CPT

## 2022-04-29 PROCEDURE — 83605 ASSAY OF LACTIC ACID: CPT

## 2022-04-29 PROCEDURE — 71045 X-RAY EXAM CHEST 1 VIEW: CPT

## 2022-04-29 PROCEDURE — 85025 COMPLETE CBC W/AUTO DIFF WBC: CPT

## 2022-04-29 PROCEDURE — 36430 TRANSFUSION BLD/BLD COMPNT: CPT

## 2022-04-29 PROCEDURE — 99285 EMERGENCY DEPT VISIT HI MDM: CPT | Mod: 25

## 2022-05-04 LAB
CULTURE RESULTS: SIGNIFICANT CHANGE UP
CULTURE RESULTS: SIGNIFICANT CHANGE UP
SPECIMEN SOURCE: SIGNIFICANT CHANGE UP
SPECIMEN SOURCE: SIGNIFICANT CHANGE UP

## 2022-05-07 ENCOUNTER — EMERGENCY (EMERGENCY)
Facility: HOSPITAL | Age: 77
LOS: 1 days | Discharge: HOME | End: 2022-05-07
Attending: EMERGENCY MEDICINE
Payer: MEDICARE

## 2022-05-07 VITALS
SYSTOLIC BLOOD PRESSURE: 138 MMHG | TEMPERATURE: 98 F | DIASTOLIC BLOOD PRESSURE: 50 MMHG | HEART RATE: 69 BPM | RESPIRATION RATE: 18 BRPM | OXYGEN SATURATION: 96 %

## 2022-05-07 VITALS
SYSTOLIC BLOOD PRESSURE: 146 MMHG | DIASTOLIC BLOOD PRESSURE: 63 MMHG | HEIGHT: 64 IN | OXYGEN SATURATION: 100 % | WEIGHT: 138.89 LBS | RESPIRATION RATE: 18 BRPM | TEMPERATURE: 98 F | HEART RATE: 87 BPM

## 2022-05-07 LAB
ANION GAP SERPL CALC-SCNC: 6 MMOL/L — SIGNIFICANT CHANGE UP (ref 5–17)
BASOPHILS # BLD AUTO: 0 K/UL — SIGNIFICANT CHANGE UP (ref 0–0.2)
BASOPHILS NFR BLD AUTO: 0 % — SIGNIFICANT CHANGE UP (ref 0–2)
BUN SERPL-MCNC: 40 MG/DL — HIGH (ref 7–18)
CALCIUM SERPL-MCNC: 9.4 MG/DL — SIGNIFICANT CHANGE UP (ref 8.4–10.5)
CHLORIDE SERPL-SCNC: 103 MMOL/L — SIGNIFICANT CHANGE UP (ref 96–108)
CO2 SERPL-SCNC: 26 MMOL/L — SIGNIFICANT CHANGE UP (ref 22–31)
CREAT SERPL-MCNC: 1.38 MG/DL — HIGH (ref 0.5–1.3)
EGFR: 40 ML/MIN/1.73M2 — LOW
EOSINOPHIL # BLD AUTO: 0 K/UL — SIGNIFICANT CHANGE UP (ref 0–0.5)
EOSINOPHIL NFR BLD AUTO: 0 % — SIGNIFICANT CHANGE UP (ref 0–6)
GLUCOSE SERPL-MCNC: 105 MG/DL — HIGH (ref 70–99)
HCT VFR BLD CALC: 18.9 % — CRITICAL LOW (ref 34.5–45)
HGB BLD-MCNC: 5.9 G/DL — CRITICAL LOW (ref 11.5–15.5)
LYMPHOCYTES # BLD AUTO: 1.67 K/UL — SIGNIFICANT CHANGE UP (ref 1–3.3)
LYMPHOCYTES # BLD AUTO: 31 % — SIGNIFICANT CHANGE UP (ref 13–44)
MCHC RBC-ENTMCNC: 31.1 PG — SIGNIFICANT CHANGE UP (ref 27–34)
MCHC RBC-ENTMCNC: 31.2 GM/DL — LOW (ref 32–36)
MCV RBC AUTO: 99.5 FL — SIGNIFICANT CHANGE UP (ref 80–100)
MONOCYTES # BLD AUTO: 0.16 K/UL — SIGNIFICANT CHANGE UP (ref 0–0.9)
MONOCYTES NFR BLD AUTO: 3 % — SIGNIFICANT CHANGE UP (ref 2–14)
NEUTROPHILS # BLD AUTO: 3.56 K/UL — SIGNIFICANT CHANGE UP (ref 1.8–7.4)
NEUTROPHILS NFR BLD AUTO: 66 % — SIGNIFICANT CHANGE UP (ref 43–77)
PLATELET # BLD AUTO: 18 K/UL — CRITICAL LOW (ref 150–400)
POTASSIUM SERPL-MCNC: 4.6 MMOL/L — SIGNIFICANT CHANGE UP (ref 3.5–5.3)
POTASSIUM SERPL-SCNC: 4.6 MMOL/L — SIGNIFICANT CHANGE UP (ref 3.5–5.3)
RBC # BLD: 1.9 M/UL — LOW (ref 3.8–5.2)
RBC # FLD: 18.9 % — HIGH (ref 10.3–14.5)
SODIUM SERPL-SCNC: 135 MMOL/L — SIGNIFICANT CHANGE UP (ref 135–145)
WBC # BLD: 5.39 K/UL — SIGNIFICANT CHANGE UP (ref 3.8–10.5)
WBC # FLD AUTO: 5.39 K/UL — SIGNIFICANT CHANGE UP (ref 3.8–10.5)

## 2022-05-07 PROCEDURE — 86901 BLOOD TYPING SEROLOGIC RH(D): CPT

## 2022-05-07 PROCEDURE — 36415 COLL VENOUS BLD VENIPUNCTURE: CPT

## 2022-05-07 PROCEDURE — 99284 EMERGENCY DEPT VISIT MOD MDM: CPT

## 2022-05-07 PROCEDURE — 85025 COMPLETE CBC W/AUTO DIFF WBC: CPT

## 2022-05-07 PROCEDURE — 86900 BLOOD TYPING SEROLOGIC ABO: CPT

## 2022-05-07 PROCEDURE — 99285 EMERGENCY DEPT VISIT HI MDM: CPT | Mod: 25

## 2022-05-07 PROCEDURE — P9040: CPT

## 2022-05-07 PROCEDURE — 36430 TRANSFUSION BLD/BLD COMPNT: CPT

## 2022-05-07 PROCEDURE — 86923 COMPATIBILITY TEST ELECTRIC: CPT

## 2022-05-07 PROCEDURE — 86850 RBC ANTIBODY SCREEN: CPT

## 2022-05-07 PROCEDURE — 80048 BASIC METABOLIC PNL TOTAL CA: CPT

## 2022-05-07 NOTE — ED PROVIDER NOTE - PATIENT PORTAL LINK FT
You can access the FollowMyHealth Patient Portal offered by [facility] by registering at the following website: [email or web address]. By joining [employer]’s FollowMyHealth portal, you will also be able to view your health information using other applications (apps) compatible with our system.

## 2022-05-07 NOTE — ED PROVIDER NOTE - OBJECTIVE STATEMENT
Patient reports Dr. Mosley sent her for another blood transfusion. Requires frequent blood transfusions due to MDS. Feels tired. No fever, ha, cp, sob, ap, n/v/d, bleeding.

## 2022-05-07 NOTE — ED PROVIDER NOTE - NSFOLLOWUPINSTRUCTIONS_ED_ALL_ED_FT
Pancytopenia    AMBULATORY CARE:    Pancytopenia is low levels of red blood cells, white blood cells, and platelets. Red blood cells carry oxygen to all the organs and tissues of your body. White blood cells help your body fight infection by attacking and killing germs. Platelets stop the bleeding when you are cut or injured. Pancytopenia increases your risk for infection and bleeding. Without treatment these problems can become life-threatening.     Common signs and symptoms:   •Feeling tired, weak, dizzy, or short of breath      •Frequent fevers or infections      •Pale skin or purple or red dots on the skin      •Bleeding from the gums or nose, blood in bowel movements or urine, or heavy bleeding from a cut      •Heavy menstrual bleeding in females      •Bruising easily, or getting bruises without an injury      Call 911 for any of the following:   •You cannot be woken.       •You have a seizure.       •You have trouble breathing.      •You cannot stop the bleeding from a wound even after you hold firm pressure for 10 minutes.       Seek care immediately if:   •You have a fever or chills.       •You feel dizzy or you faint.       •You have blood in your bowel movements or urine.       •Your heart is beating faster than usual.       Contact your healthcare provider if:   •You have a rash or red or purple dots on your skin.       •You feel more tired than usual.       •You have questions or concerns about your condition or care.      Treatment for pancytopenia:   •Medicines may be given to treat the cause of pancytopenia.       •Take your medicine as directed. Contact your healthcare provider if you think your medicine is not helping or if you have side effects. Tell him or her if you are allergic to any medicine. Keep a list of the medicines, vitamins, and herbs you take. Include the amounts, and when and why you take them. Bring the list or the pill bottles to follow-up visits. Carry your medicine list with you in case of an emergency.      •A blood transfusion can help increase red blood cell, white blood cell, and platelet levels. This may prevent bleeding or organ damage. This does not treat pancytopenia. Instead, a blood transfusion may keep you safe until the cause of pancytopenia is known.       •A stem cell transplant is a procedure to replace unhealthy stem cells with healthy cells. Stem cells are able to become all of the blood cells. Stem cells can also travel to your bone marrow and can become new bone marrow cells.       Balance activity with rest: Do activities when your energy levels are the highest. Know your limits and do not plan too many activities for one day. Rest when you need to.     Prevent or control bleeding:   •Do not take aspirin or NSAIDs. These medicines can cause you to bleed and bruise more easily.       •Use caution with skin and mouth care. Use a soft washcloth and a soft toothbrush. This can keep your skin and gums from bleeding. Keep your nails trimmed to prevent scratches. If you shave, use an electric shaver.       •Apply firm, steady pressure to stop bleeding from a wound. Apply pressure with a clean gauze or towel for 5 to 10 minutes. Call 911 if bleeding becomes heavy or does not stop.       •Do not play contact sports or do activities that can cause bleeding. Ask your healthcare provider what activities are safe for you to do.       Prevent infection:   •Wash your hands often. Use an alcohol-based hand rub if soap and water are not available.   Handwashing           •Stay away from crowds and anyone who may be sick. Ask your healthcare provider if you need to wear a mask in public places.       •Eat a low-bacteria diet as directed. This will help decrease your risk for an infection. Choose, prepare, and cook foods that contain a low amount of bacteria. Examples include pasteurized milk, well-cooked meats, and cooked pasta. Ask your healthcare provider for more information about a low-bacteria diet.       Follow up with your healthcare provider as directed: You will need to return for blood tests frequently. You may also need regular blood transfusions. Write down your questions so you remember to ask them during your visits.        © Copyright [employer] 2022           back to top                          © Copyright [employer] 2022

## 2022-05-07 NOTE — ED PROVIDER NOTE - PROGRESS NOTE DETAILS
Spoke with Dr. Mosley. Agreed with 2 units PRBCs. No platelets required. Transfusion complete. Patient is resting comfortably, NAD.

## 2022-05-11 ENCOUNTER — EMERGENCY (EMERGENCY)
Facility: HOSPITAL | Age: 77
LOS: 1 days | Discharge: HOME | End: 2022-05-11
Attending: STUDENT IN AN ORGANIZED HEALTH CARE EDUCATION/TRAINING PROGRAM
Payer: MEDICARE

## 2022-05-11 VITALS
HEIGHT: 64 IN | HEART RATE: 91 BPM | RESPIRATION RATE: 18 BRPM | SYSTOLIC BLOOD PRESSURE: 92 MMHG | DIASTOLIC BLOOD PRESSURE: 54 MMHG | TEMPERATURE: 99 F | OXYGEN SATURATION: 100 %

## 2022-05-11 PROCEDURE — 96372 THER/PROPH/DIAG INJ SC/IM: CPT

## 2022-05-11 PROCEDURE — 99284 EMERGENCY DEPT VISIT MOD MDM: CPT

## 2022-05-11 PROCEDURE — 99283 EMERGENCY DEPT VISIT LOW MDM: CPT | Mod: 25

## 2022-05-11 RX ORDER — KETOROLAC TROMETHAMINE 30 MG/ML
15 SYRINGE (ML) INJECTION ONCE
Refills: 0 | Status: DISCONTINUED | OUTPATIENT
Start: 2022-05-11 | End: 2022-05-11

## 2022-05-11 RX ORDER — ACETAMINOPHEN 500 MG
650 TABLET ORAL ONCE
Refills: 0 | Status: COMPLETED | OUTPATIENT
Start: 2022-05-11 | End: 2022-05-11

## 2022-05-11 RX ORDER — LIDOCAINE 4 G/100G
1 CREAM TOPICAL ONCE
Refills: 0 | Status: COMPLETED | OUTPATIENT
Start: 2022-05-11 | End: 2022-05-11

## 2022-05-11 RX ADMIN — LIDOCAINE 1 PATCH: 4 CREAM TOPICAL at 23:16

## 2022-05-11 RX ADMIN — Medication 650 MILLIGRAM(S): at 23:17

## 2022-05-11 RX ADMIN — Medication 15 MILLIGRAM(S): at 23:16

## 2022-05-12 ENCOUNTER — EMERGENCY (EMERGENCY)
Facility: HOSPITAL | Age: 77
LOS: 1 days | Discharge: HOME | End: 2022-05-12
Attending: EMERGENCY MEDICINE
Payer: MEDICARE

## 2022-05-12 VITALS
SYSTOLIC BLOOD PRESSURE: 142 MMHG | DIASTOLIC BLOOD PRESSURE: 82 MMHG | TEMPERATURE: 98 F | HEART RATE: 81 BPM | OXYGEN SATURATION: 95 % | RESPIRATION RATE: 16 BRPM

## 2022-05-12 VITALS
DIASTOLIC BLOOD PRESSURE: 69 MMHG | TEMPERATURE: 99 F | WEIGHT: 138.89 LBS | HEART RATE: 107 BPM | OXYGEN SATURATION: 100 % | HEIGHT: 64 IN | RESPIRATION RATE: 16 BRPM | SYSTOLIC BLOOD PRESSURE: 102 MMHG

## 2022-05-12 LAB
ALBUMIN SERPL ELPH-MCNC: 2.1 G/DL — LOW (ref 3.5–5)
ALP SERPL-CCNC: 36 U/L — LOW (ref 40–120)
ALT FLD-CCNC: 16 U/L DA — SIGNIFICANT CHANGE UP (ref 10–60)
ANION GAP SERPL CALC-SCNC: 9 MMOL/L — SIGNIFICANT CHANGE UP (ref 5–17)
ANISOCYTOSIS BLD QL: SLIGHT — SIGNIFICANT CHANGE UP
AST SERPL-CCNC: 14 U/L — SIGNIFICANT CHANGE UP (ref 10–40)
BASOPHILS # BLD AUTO: 0 K/UL — SIGNIFICANT CHANGE UP (ref 0–0.2)
BASOPHILS NFR BLD AUTO: 0 % — SIGNIFICANT CHANGE UP (ref 0–2)
BILIRUB SERPL-MCNC: 0.3 MG/DL — SIGNIFICANT CHANGE UP (ref 0.2–1.2)
BLD GP AB SCN SERPL QL: SIGNIFICANT CHANGE UP
BUN SERPL-MCNC: 53 MG/DL — HIGH (ref 7–18)
CALCIUM SERPL-MCNC: 8.5 MG/DL — SIGNIFICANT CHANGE UP (ref 8.4–10.5)
CHLORIDE SERPL-SCNC: 102 MMOL/L — SIGNIFICANT CHANGE UP (ref 96–108)
CO2 SERPL-SCNC: 22 MMOL/L — SIGNIFICANT CHANGE UP (ref 22–31)
CREAT SERPL-MCNC: 0.9 MG/DL — SIGNIFICANT CHANGE UP (ref 0.5–1.3)
EGFR: 67 ML/MIN/1.73M2 — SIGNIFICANT CHANGE UP
EOSINOPHIL # BLD AUTO: 0 K/UL — SIGNIFICANT CHANGE UP (ref 0–0.5)
EOSINOPHIL NFR BLD AUTO: 0 % — SIGNIFICANT CHANGE UP (ref 0–6)
GIANT PLATELETS BLD QL SMEAR: PRESENT — SIGNIFICANT CHANGE UP
GLUCOSE SERPL-MCNC: 149 MG/DL — HIGH (ref 70–99)
HCT VFR BLD CALC: 13.7 % — CRITICAL LOW (ref 34.5–45)
HGB BLD-MCNC: 4.4 G/DL — CRITICAL LOW (ref 11.5–15.5)
HYPOCHROMIA BLD QL: SIGNIFICANT CHANGE UP
LG PLATELETS BLD QL AUTO: SLIGHT — SIGNIFICANT CHANGE UP
LIDOCAIN IGE QN: 80 U/L — SIGNIFICANT CHANGE UP (ref 73–393)
LYMPHOCYTES # BLD AUTO: 1.36 K/UL — SIGNIFICANT CHANGE UP (ref 1–3.3)
LYMPHOCYTES # BLD AUTO: 23 % — SIGNIFICANT CHANGE UP (ref 13–44)
MACROCYTES BLD QL: SLIGHT — SIGNIFICANT CHANGE UP
MANUAL SMEAR VERIFICATION: SIGNIFICANT CHANGE UP
MCHC RBC-ENTMCNC: 30.6 PG — SIGNIFICANT CHANGE UP (ref 27–34)
MCHC RBC-ENTMCNC: 32.1 GM/DL — SIGNIFICANT CHANGE UP (ref 32–36)
MCV RBC AUTO: 95.1 FL — SIGNIFICANT CHANGE UP (ref 80–100)
METAMYELOCYTES # FLD: 1 % — HIGH (ref 0–0)
MONOCYTES # BLD AUTO: 0.36 K/UL — SIGNIFICANT CHANGE UP (ref 0–0.9)
MONOCYTES NFR BLD AUTO: 6 % — SIGNIFICANT CHANGE UP (ref 2–14)
MYELOCYTES NFR BLD: 2 % — HIGH (ref 0–0)
NEUTROPHILS # BLD AUTO: 3.85 K/UL — SIGNIFICANT CHANGE UP (ref 1.8–7.4)
NEUTROPHILS NFR BLD AUTO: 64 % — SIGNIFICANT CHANGE UP (ref 43–77)
NEUTS BAND # BLD: 1 % — SIGNIFICANT CHANGE UP (ref 0–8)
NRBC # BLD: 3 /100 — HIGH (ref 0–0)
OVALOCYTES BLD QL SMEAR: SLIGHT — SIGNIFICANT CHANGE UP
PLAT MORPH BLD: ABNORMAL
PLATELET # BLD AUTO: 13 K/UL — CRITICAL LOW (ref 150–400)
PLATELET COUNT - ESTIMATE: ABNORMAL
POIKILOCYTOSIS BLD QL AUTO: SLIGHT — SIGNIFICANT CHANGE UP
POTASSIUM SERPL-MCNC: 3.8 MMOL/L — SIGNIFICANT CHANGE UP (ref 3.5–5.3)
POTASSIUM SERPL-SCNC: 3.8 MMOL/L — SIGNIFICANT CHANGE UP (ref 3.5–5.3)
PROMYELOCYTES # FLD: 1 % — HIGH (ref 0–0)
PROT SERPL-MCNC: 4.3 G/DL — LOW (ref 6–8.3)
RBC # BLD: 1.44 M/UL — LOW (ref 3.8–5.2)
RBC # FLD: 17.5 % — HIGH (ref 10.3–14.5)
RBC BLD AUTO: ABNORMAL
SARS-COV-2 RNA SPEC QL NAA+PROBE: SIGNIFICANT CHANGE UP
SODIUM SERPL-SCNC: 133 MMOL/L — LOW (ref 135–145)
TARGETS BLD QL SMEAR: SLIGHT — SIGNIFICANT CHANGE UP
VARIANT LYMPHS # BLD: 2 % — SIGNIFICANT CHANGE UP (ref 0–6)
WBC # BLD: 5.93 K/UL — SIGNIFICANT CHANGE UP (ref 3.8–10.5)
WBC # FLD AUTO: 5.93 K/UL — SIGNIFICANT CHANGE UP (ref 3.8–10.5)

## 2022-05-12 PROCEDURE — 93010 ELECTROCARDIOGRAM REPORT: CPT

## 2022-05-12 PROCEDURE — 99285 EMERGENCY DEPT VISIT HI MDM: CPT

## 2022-05-12 RX ORDER — CARVEDILOL PHOSPHATE 80 MG/1
25 CAPSULE, EXTENDED RELEASE ORAL ONCE
Refills: 0 | Status: COMPLETED | OUTPATIENT
Start: 2022-05-12 | End: 2022-05-12

## 2022-05-12 RX ORDER — ONDANSETRON 8 MG/1
4 TABLET, FILM COATED ORAL ONCE
Refills: 0 | Status: COMPLETED | OUTPATIENT
Start: 2022-05-12 | End: 2022-05-12

## 2022-05-12 RX ORDER — ACETAMINOPHEN 500 MG
650 TABLET ORAL ONCE
Refills: 0 | Status: COMPLETED | OUTPATIENT
Start: 2022-05-12 | End: 2022-05-12

## 2022-05-12 RX ORDER — MORPHINE SULFATE 50 MG/1
2 CAPSULE, EXTENDED RELEASE ORAL ONCE
Refills: 0 | Status: DISCONTINUED | OUTPATIENT
Start: 2022-05-12 | End: 2022-05-12

## 2022-05-12 RX ADMIN — MORPHINE SULFATE 2 MILLIGRAM(S): 50 CAPSULE, EXTENDED RELEASE ORAL at 17:33

## 2022-05-12 RX ADMIN — Medication 650 MILLIGRAM(S): at 00:00

## 2022-05-12 RX ADMIN — MORPHINE SULFATE 2 MILLIGRAM(S): 50 CAPSULE, EXTENDED RELEASE ORAL at 18:00

## 2022-05-12 RX ADMIN — Medication 15 MILLIGRAM(S): at 00:00

## 2022-05-12 RX ADMIN — ONDANSETRON 4 MILLIGRAM(S): 8 TABLET, FILM COATED ORAL at 16:08

## 2022-05-12 NOTE — ED PROVIDER NOTE - CHPI ED SYMPTOMS NEG
no history of blood clots, skin changes, trauma, diarrhea, chest pain, shortness of breath/no fever/no nausea/no numbness/no vomiting/no weakness

## 2022-05-12 NOTE — ED ADULT NURSE REASSESSMENT NOTE - NS ED NURSE REASSESS COMMENT FT1
received from SALENA Song, pt on bed well rested on high back rest, with second unit PRBC in progress left wrist , IV site no redness or swelling noted, pt denies any pain or SOB, will cont. care and monitor patient.

## 2022-05-12 NOTE — ED PROVIDER NOTE - CLINICAL SUMMARY MEDICAL DECISION MAKING FREE TEXT BOX
76 year old female with MDS here for suspected anemia. Will perform labs and reassess. 76 year old female with MDS here for suspected anemia. Will perform labs and reassess.    More 0552:  Pt s/o from Dr Key pending blood transfusion. 3 units transfused with no issues and pt now requesting immediate d/c home. Pt to see Dr Mosley in clinic ASAP. Discussed with pt my clinical impression and results, patient given strict return precautions if persistent or worsening of symptoms occurs, and need for close follow up. Pt expressed understanding and agrees with plan. Pt is well appearing with a reassuring exam. Discharge home with PMD or Specialist f/u within 5 days.

## 2022-05-12 NOTE — ED PROVIDER NOTE - OBJECTIVE STATEMENT
Patient is a 76 year old female with a PHMx of MDS requiring multiple transfusions presenting to the ED with complaints of generalized weakness. Patient states she had right leg pain last night which usually accompanies worsening anemia. Patient was last transfused two units on Saturday. Patient denies any headache, vomiting, diarrhea, and chest pain.   Allergies:   Penicillin (Hives)

## 2022-05-12 NOTE — ED PROVIDER NOTE - PATIENT PORTAL LINK FT
You can access the FollowMyHealth Patient Portal offered by [facility] by registering at the following website: [email or web address]. By joining [employer]’s FollowMyHealth portal, you will also be able to view your health information using other applications (apps) compatible with our system.

## 2022-05-12 NOTE — ED PROVIDER NOTE - CLINICAL SUMMARY MEDICAL DECISION MAKING FREE TEXT BOX
Patient presenting with right leg pain which in now minimal although BP is low. BP recorded recently. Will give PO fluids since patient prefers them and check vitals. Patient stable, will reassess. Patient presenting with right leg pain which in now minimal although BP is low. BP recorded recently. Will give PO fluids since patient prefers them and check vitals. Patient stable, will reassess.    On reassessment BP wnl and pt states that she feels well with no pain nor dizziness. Rec holding BP meds and PMD f/u within 3 days. Most likely non emergent etiology of symptoms- the details of the case, history, and exam make more emergent diagnoses much less likely. Discussed with pt my clinical impression and results, patient given strict return precautions if persistent or worsening of symptoms occurs, and need for close follow up. Pt expressed understanding and agrees with plan. Pt is well appearing with a reassuring exam. Discharge home with PMD or Specialist f/u within 3 days.

## 2022-05-12 NOTE — ED PROVIDER NOTE - PROGRESS NOTE DETAILS
Patient felt nausea and abdominal discomfort, was found to be hypotensive, code fusion called. Patient's blood pressure slowly improved. Initiating blood transfusion. Symptoms more suggestive of vasovagal episode. Pt adamant no to be admitted. Educated on results, spoke with Dr. Mosley who recommended transfusion 3 U and d/c home afterwards. Pt with formed stool no abd pain, refused stool guiaic to assess for concominant Gi bleed causing anemia, r/b/a explained.

## 2022-05-12 NOTE — ED PROVIDER NOTE - NSFOLLOWUPINSTRUCTIONS_ED_ALL_ED_FT
You were seen in the emergency room today for leg pain. Your blood pressure was found to be low in the ER. Please hold your blood pressure medications tomorrow and call your doctor. Please call your primary doctor to inform them of this ER visit and obtain the next available appointment within the next 3 days. As we discussed, return to the ER if you have any worsening symptoms.    We no longer feel that you need further emergency care or admission to the hospital at this time.    While we have determined that you are currently stable for discharge, we know that things can change. Please seek immediate medical attention or return to the ER if you experience any of the following:  Any worsening or persistent symptoms  Severe Pain  Chest Pain  Difficulty Breathing  Bleeding  Passing Out  Severe Rash  Inability to Eat or Drink  Persistent Fever    Please see a primary care doctor or specialist within 3 days to ensure that you are improving.    Please call the [facility] phone numbers on this document if you have any problems obtaining a follow up appointment.    I wish you well! -Dr Salcido

## 2022-05-12 NOTE — ED PROVIDER NOTE - PHYSICAL EXAMINATION
Vital Signs Reviewed: BP 92/54, otherwise normal  GEN: Comfortable, NAD, AAOx3  HEENT: NCAT, EOMI, MMM, Neck Supple  RESP: CTAB, No rales/rhonchi/wheezing  CV: RRR, S1S2, No murmurs  ABD: No TTP, ND, No masses, No CVA Tenderness  Extrem/Skin: Equal pulses bilat, DP pulses normal, Capillary refill under 2 seconds bilaterally in toes, No cyanosis/edema/rashes  Neuro: Normal Gait, No focal deficits

## 2022-05-12 NOTE — ED PROVIDER NOTE - PATIENT PORTAL LINK FT
You can access the FollowMyHealth Patient Portal offered by [facility] by registering at the following website: [email or web address]. By joining [employer]’s FollowMyHealth portal, you will also be able to view your health information using other applications (apps) compatible with our system.

## 2022-05-12 NOTE — ED PROVIDER NOTE - OBJECTIVE STATEMENT
76 year old female with PMHx of hypertension, MDS and no significant PSHx presents to the ED with complaints of right leg pain for the past 2 hours which has now become very mild per patient. Patient states that her blood pressure has been very low recently and her meds are being adjusted and was seen in clinic this week and was found to have systolic pressure of 60s and blood pressure meds were reduced at that time. Patient states on standing she feels light headedness, otherwise she is feeling well. Patient denies fevers, skin changes, history of blood clots, trauma, nausea, vomiting, diarrhea, chest pain, shortness of breath, focal numbness or weakness, or any recent illness or hospitalization. Allergies: Penicillin (hives)

## 2022-05-12 NOTE — ED PROVIDER NOTE - NSFOLLOWUPINSTRUCTIONS_ED_ALL_ED_FT
Blood Transfusion, Adult, Care After      This sheet gives you information about how to care for yourself after your procedure. Your health care provider may also give you more specific instructions. If you have problems or questions, contact your health care provider.      What can I expect after the procedure?    After the procedure, it is common to have:  •Bruising and soreness where the IV was inserted.      •A fever or chills on the day of the procedure. This may be your body's response to the new blood cells received.      •A headache.        Follow these instructions at home:      IV insertion site care                 •Follow instructions from your health care provider about how to take care of your IV insertion site. Make sure you:  •Wash your hands with soap and water before and after you change your bandage (dressing). If soap and water are not available, use hand .      •Change your dressing as told by your health care provider.      •Check your IV insertion site every day for signs of infection. Check for:  •Redness, swelling, or pain.      •Bleeding from the site.      •Warmth.      •Pus or a bad smell.        General instructions     •Take over-the-counter and prescription medicines only as told by your health care provider.      •Rest as told by your health care provider.      •Return to your normal activities as told by your health care provider.      •Keep all follow-up visits as told by your health care provider. This is important.        Contact a health care provider if:    •You have itching or red, swollen areas of skin (hives).      •You feel anxious.      •You feel weak after doing your normal activities.      •You have redness, swelling, warmth, or pain around the IV insertion site.      •You have blood coming from the IV insertion site that does not stop with pressure.      •You have pus or a bad smell coming from your IV insertion site.        Get help right away if:  •You have symptoms of a serious allergic or immune system reaction, including:  •Trouble breathing or shortness of breath.      •Swelling of the face or feeling flushed.      •Fever or chills.      •Pain in the head, back, or chest.      •Dark urine or blood in the urine.      •Widespread rash.      •Fast heartbeat.      •Feeling dizzy or light-headed.        If you receive your blood transfusion in an outpatient setting, you will be told whom to contact to report any reactions.    These symptoms may represent a serious problem that is an emergency. Do not wait to see if the symptoms will go away. Get medical help right away. Call your local emergency services (911 in the [place] Do not drive yourself to the hospital.       Summary    •Bruising and tenderness around the IV insertion site are common.      •Check your IV insertion site every day for signs of infection.      •Rest as told by your health care provider. Return to your normal activities as told by your health care provider.      •Get help right away for symptoms of a serious allergic or immune system reaction to blood transfusion.      This information is not intended to replace advice given to you by your health care provider. Make sure you discuss any questions you have with your health care provider.

## 2022-05-13 VITALS
RESPIRATION RATE: 18 BRPM | HEART RATE: 89 BPM | TEMPERATURE: 99 F | DIASTOLIC BLOOD PRESSURE: 70 MMHG | SYSTOLIC BLOOD PRESSURE: 156 MMHG | OXYGEN SATURATION: 98 %

## 2022-05-13 PROCEDURE — 36430 TRANSFUSION BLD/BLD COMPNT: CPT

## 2022-05-13 PROCEDURE — 82962 GLUCOSE BLOOD TEST: CPT

## 2022-05-13 PROCEDURE — 96374 THER/PROPH/DIAG INJ IV PUSH: CPT

## 2022-05-13 PROCEDURE — 96375 TX/PRO/DX INJ NEW DRUG ADDON: CPT

## 2022-05-13 PROCEDURE — 93005 ELECTROCARDIOGRAM TRACING: CPT

## 2022-05-13 PROCEDURE — 86900 BLOOD TYPING SEROLOGIC ABO: CPT

## 2022-05-13 PROCEDURE — 80053 COMPREHEN METABOLIC PANEL: CPT

## 2022-05-13 PROCEDURE — 99285 EMERGENCY DEPT VISIT HI MDM: CPT | Mod: 25

## 2022-05-13 PROCEDURE — 86850 RBC ANTIBODY SCREEN: CPT

## 2022-05-13 PROCEDURE — 86901 BLOOD TYPING SEROLOGIC RH(D): CPT

## 2022-05-13 PROCEDURE — 83690 ASSAY OF LIPASE: CPT

## 2022-05-13 PROCEDURE — 86923 COMPATIBILITY TEST ELECTRIC: CPT

## 2022-05-13 PROCEDURE — P9040: CPT

## 2022-05-13 PROCEDURE — 85025 COMPLETE CBC W/AUTO DIFF WBC: CPT

## 2022-05-13 PROCEDURE — 36415 COLL VENOUS BLD VENIPUNCTURE: CPT

## 2022-05-13 PROCEDURE — 87635 SARS-COV-2 COVID-19 AMP PRB: CPT

## 2022-05-13 RX ADMIN — CARVEDILOL PHOSPHATE 25 MILLIGRAM(S): 80 CAPSULE, EXTENDED RELEASE ORAL at 00:22

## 2022-05-13 NOTE — ED ADULT NURSE NOTE - OBJECTIVE STATEMENT
Pt from home, sabiha was here on Saturday, received 2U PRBC, and today feeling dizzy and right lower leg pain. Pt looks very pallor, multiple bursing noted to gissell upper extremities.

## 2022-05-24 NOTE — ED PROVIDER NOTE - NS ED MD EM SELECTION
Adenike//[facility]-PT states she needs to get an Order for a [employer] to also include a Demographics sheet with Insurance information as this needs to be done thru [employer]. Please call if any questions.  Thank you    Ph# is [phone]        Fax# is [phone]
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
## 2022-06-03 ENCOUNTER — EMERGENCY (EMERGENCY)
Facility: HOSPITAL | Age: 77
LOS: 1 days | Discharge: HOME | End: 2022-06-03
Attending: EMERGENCY MEDICINE
Payer: MEDICARE

## 2022-06-03 VITALS
TEMPERATURE: 100 F | HEIGHT: 64 IN | SYSTOLIC BLOOD PRESSURE: 124 MMHG | OXYGEN SATURATION: 99 % | RESPIRATION RATE: 18 BRPM | DIASTOLIC BLOOD PRESSURE: 71 MMHG | WEIGHT: 134.04 LBS | HEART RATE: 89 BPM

## 2022-06-03 LAB
ALBUMIN SERPL ELPH-MCNC: 2.8 G/DL — LOW (ref 3.5–5)
ALP SERPL-CCNC: 93 U/L — SIGNIFICANT CHANGE UP (ref 40–120)
ALT FLD-CCNC: 18 U/L DA — SIGNIFICANT CHANGE UP (ref 10–60)
ANION GAP SERPL CALC-SCNC: 7 MMOL/L — SIGNIFICANT CHANGE UP (ref 5–17)
APPEARANCE UR: CLEAR — SIGNIFICANT CHANGE UP
AST SERPL-CCNC: 12 U/L — SIGNIFICANT CHANGE UP (ref 10–40)
BACTERIA # UR AUTO: ABNORMAL /HPF
BASOPHILS # BLD AUTO: 0 K/UL — SIGNIFICANT CHANGE UP (ref 0–0.2)
BASOPHILS NFR BLD AUTO: 0 % — SIGNIFICANT CHANGE UP (ref 0–2)
BILIRUB SERPL-MCNC: 0.4 MG/DL — SIGNIFICANT CHANGE UP (ref 0.2–1.2)
BILIRUB UR-MCNC: NEGATIVE — SIGNIFICANT CHANGE UP
BUN SERPL-MCNC: 29 MG/DL — HIGH (ref 7–18)
CALCIUM SERPL-MCNC: 8.7 MG/DL — SIGNIFICANT CHANGE UP (ref 8.4–10.5)
CHLORIDE SERPL-SCNC: 106 MMOL/L — SIGNIFICANT CHANGE UP (ref 96–108)
CO2 SERPL-SCNC: 25 MMOL/L — SIGNIFICANT CHANGE UP (ref 22–31)
COLOR SPEC: YELLOW — SIGNIFICANT CHANGE UP
CREAT SERPL-MCNC: 0.85 MG/DL — SIGNIFICANT CHANGE UP (ref 0.5–1.3)
DIFF PNL FLD: ABNORMAL
EGFR: 71 ML/MIN/1.73M2 — SIGNIFICANT CHANGE UP
EOSINOPHIL # BLD AUTO: 0 K/UL — SIGNIFICANT CHANGE UP (ref 0–0.5)
EOSINOPHIL NFR BLD AUTO: 0 % — SIGNIFICANT CHANGE UP (ref 0–6)
EPI CELLS # UR: SIGNIFICANT CHANGE UP /HPF
GLUCOSE SERPL-MCNC: 117 MG/DL — HIGH (ref 70–99)
GLUCOSE UR QL: NEGATIVE — SIGNIFICANT CHANGE UP
HCT VFR BLD CALC: 20.6 % — CRITICAL LOW (ref 34.5–45)
HGB BLD-MCNC: 6.5 G/DL — CRITICAL LOW (ref 11.5–15.5)
KETONES UR-MCNC: NEGATIVE — SIGNIFICANT CHANGE UP
LEUKOCYTE ESTERASE UR-ACNC: ABNORMAL
LYMPHOCYTES # BLD AUTO: 1.01 K/UL — SIGNIFICANT CHANGE UP (ref 1–3.3)
LYMPHOCYTES # BLD AUTO: 21 % — SIGNIFICANT CHANGE UP (ref 13–44)
MCHC RBC-ENTMCNC: 29.5 PG — SIGNIFICANT CHANGE UP (ref 27–34)
MCHC RBC-ENTMCNC: 31.6 GM/DL — LOW (ref 32–36)
MCV RBC AUTO: 93.6 FL — SIGNIFICANT CHANGE UP (ref 80–100)
MONOCYTES # BLD AUTO: 0.29 K/UL — SIGNIFICANT CHANGE UP (ref 0–0.9)
MONOCYTES NFR BLD AUTO: 6 % — SIGNIFICANT CHANGE UP (ref 2–14)
NEUTROPHILS # BLD AUTO: 3.18 K/UL — SIGNIFICANT CHANGE UP (ref 1.8–7.4)
NEUTROPHILS NFR BLD AUTO: 64 % — SIGNIFICANT CHANGE UP (ref 43–77)
NITRITE UR-MCNC: POSITIVE
PH UR: 6 — SIGNIFICANT CHANGE UP (ref 5–8)
PLATELET # BLD AUTO: 16 K/UL — CRITICAL LOW (ref 150–400)
POTASSIUM SERPL-MCNC: 4.2 MMOL/L — SIGNIFICANT CHANGE UP (ref 3.5–5.3)
POTASSIUM SERPL-SCNC: 4.2 MMOL/L — SIGNIFICANT CHANGE UP (ref 3.5–5.3)
PROT SERPL-MCNC: 5.9 G/DL — LOW (ref 6–8.3)
PROT UR-MCNC: 15
RBC # BLD: 2.2 M/UL — LOW (ref 3.8–5.2)
RBC # FLD: 15.3 % — HIGH (ref 10.3–14.5)
RBC CASTS # UR COMP ASSIST: SIGNIFICANT CHANGE UP /HPF (ref 0–2)
SODIUM SERPL-SCNC: 138 MMOL/L — SIGNIFICANT CHANGE UP (ref 135–145)
SP GR SPEC: 1.01 — SIGNIFICANT CHANGE UP (ref 1.01–1.02)
UROBILINOGEN FLD QL: NEGATIVE — SIGNIFICANT CHANGE UP
WBC # BLD: 4.82 K/UL — SIGNIFICANT CHANGE UP (ref 3.8–10.5)
WBC # FLD AUTO: 4.82 K/UL — SIGNIFICANT CHANGE UP (ref 3.8–10.5)
WBC UR QL: ABNORMAL /HPF (ref 0–5)

## 2022-06-03 PROCEDURE — 99284 EMERGENCY DEPT VISIT MOD MDM: CPT

## 2022-06-03 RX ORDER — CARVEDILOL PHOSPHATE 80 MG/1
25 CAPSULE, EXTENDED RELEASE ORAL ONCE
Refills: 0 | Status: COMPLETED | OUTPATIENT
Start: 2022-06-03 | End: 2022-06-03

## 2022-06-03 RX ORDER — SODIUM CHLORIDE 9 MG/ML
3 INJECTION INTRAMUSCULAR; INTRAVENOUS; SUBCUTANEOUS EVERY 8 HOURS
Refills: 0 | Status: DISCONTINUED | OUTPATIENT
Start: 2022-06-03 | End: 2022-06-07

## 2022-06-03 RX ADMIN — CARVEDILOL PHOSPHATE 25 MILLIGRAM(S): 80 CAPSULE, EXTENDED RELEASE ORAL at 19:50

## 2022-06-03 NOTE — ED PROVIDER NOTE - PATIENT PORTAL LINK FT
You can access the FollowMyHealth Patient Portal offered by [facility] by registering at the following website: [email or web address]. By joining [employer]’s FollowMyHealth portal, you will also be able to view your health information using other applications (apps) compatible with our system.

## 2022-06-03 NOTE — ED ADULT NURSE NOTE - CAS ELECT INFOMATION PROVIDED
Follow-up with Primary Care Provider. Return to the ED for new or worsening symptoms./DC instructions

## 2022-06-03 NOTE — ED ADULT NURSE NOTE - OBJECTIVE STATEMENT
pt is here for blood transfusion.  pt stated that sending from PCP for blood transfusion, denied chest pain or sob, denied N/V/D

## 2022-06-03 NOTE — ED PROVIDER NOTE - PROGRESS NOTE DETAILS
labs - Hgb 6.5, plt 16  Transfusion in progress  Sign out to Dr An to fu reassessment and anticipate dc home after transfusion Patient completed transfusion. no reaction. noting nitrite in urine. well appearing. patient denies urinary symptoms. given return precaution, abx and instructed to f/u pmd and hematologist

## 2022-06-03 NOTE — ED PROVIDER NOTE - NS ED ROS FT
CONSTITUTIONAL: no fever  EYES: no eye pain   ENMT: no throat pain  CARDIOVASCULAR: no chest pain   RESPIRATORY: no shortness of breath   GASTROINTESTINAL: no abdominal pain   GENITOURINARY: no dysuria   SKIN: no rashes  NEUROLOGICAL: no headache

## 2022-06-04 VITALS
HEART RATE: 82 BPM | DIASTOLIC BLOOD PRESSURE: 78 MMHG | OXYGEN SATURATION: 98 % | SYSTOLIC BLOOD PRESSURE: 142 MMHG | TEMPERATURE: 98 F | RESPIRATION RATE: 18 BRPM

## 2022-06-04 PROCEDURE — 80053 COMPREHEN METABOLIC PANEL: CPT

## 2022-06-04 PROCEDURE — 99285 EMERGENCY DEPT VISIT HI MDM: CPT | Mod: 25

## 2022-06-04 PROCEDURE — 86900 BLOOD TYPING SEROLOGIC ABO: CPT

## 2022-06-04 PROCEDURE — 86870 RBC ANTIBODY IDENTIFICATION: CPT

## 2022-06-04 PROCEDURE — 86850 RBC ANTIBODY SCREEN: CPT

## 2022-06-04 PROCEDURE — P9040: CPT

## 2022-06-04 PROCEDURE — 85025 COMPLETE CBC W/AUTO DIFF WBC: CPT

## 2022-06-04 PROCEDURE — 36415 COLL VENOUS BLD VENIPUNCTURE: CPT

## 2022-06-04 PROCEDURE — 81001 URINALYSIS AUTO W/SCOPE: CPT

## 2022-06-04 PROCEDURE — P9100: CPT

## 2022-06-04 PROCEDURE — 86901 BLOOD TYPING SEROLOGIC RH(D): CPT

## 2022-06-04 PROCEDURE — 87186 SC STD MICRODIL/AGAR DIL: CPT

## 2022-06-04 PROCEDURE — 36430 TRANSFUSION BLD/BLD COMPNT: CPT

## 2022-06-04 PROCEDURE — P9037: CPT

## 2022-06-04 PROCEDURE — 86922 COMPATIBILITY TEST ANTIGLOB: CPT

## 2022-06-04 PROCEDURE — 87086 URINE CULTURE/COLONY COUNT: CPT

## 2022-06-04 RX ORDER — MOXIFLOXACIN HYDROCHLORIDE TABLETS, 400 MG 400 MG/1
1 TABLET, FILM COATED ORAL
Qty: 14 | Refills: 0
Start: 2022-06-04 | End: 2022-06-10

## 2022-06-04 RX ADMIN — SODIUM CHLORIDE 3 MILLILITER(S): 9 INJECTION INTRAMUSCULAR; INTRAVENOUS; SUBCUTANEOUS at 00:11

## 2022-06-06 LAB
-  AMIKACIN: SIGNIFICANT CHANGE UP
-  AMOXICILLIN/CLAVULANIC ACID: SIGNIFICANT CHANGE UP
-  AMPICILLIN/SULBACTAM: SIGNIFICANT CHANGE UP
-  AMPICILLIN: SIGNIFICANT CHANGE UP
-  AZTREONAM: SIGNIFICANT CHANGE UP
-  CEFAZOLIN: SIGNIFICANT CHANGE UP
-  CEFEPIME: SIGNIFICANT CHANGE UP
-  CEFTRIAXONE: SIGNIFICANT CHANGE UP
-  CIPROFLOXACIN: SIGNIFICANT CHANGE UP
-  ERTAPENEM: SIGNIFICANT CHANGE UP
-  GENTAMICIN: SIGNIFICANT CHANGE UP
-  IMIPENEM: SIGNIFICANT CHANGE UP
-  LEVOFLOXACIN: SIGNIFICANT CHANGE UP
-  MEROPENEM: SIGNIFICANT CHANGE UP
-  NITROFURANTOIN: SIGNIFICANT CHANGE UP
-  PIPERACILLIN/TAZOBACTAM: SIGNIFICANT CHANGE UP
-  TIGECYCLINE: SIGNIFICANT CHANGE UP
-  TOBRAMYCIN: SIGNIFICANT CHANGE UP
-  TRIMETHOPRIM/SULFAMETHOXAZOLE: SIGNIFICANT CHANGE UP
CULTURE RESULTS: SIGNIFICANT CHANGE UP
METHOD TYPE: SIGNIFICANT CHANGE UP
ORGANISM # SPEC MICROSCOPIC CNT: SIGNIFICANT CHANGE UP
ORGANISM # SPEC MICROSCOPIC CNT: SIGNIFICANT CHANGE UP
SPECIMEN SOURCE: SIGNIFICANT CHANGE UP

## 2022-06-14 ENCOUNTER — EMERGENCY (EMERGENCY)
Facility: HOSPITAL | Age: 77
LOS: 1 days | Discharge: HOME | End: 2022-06-14
Attending: EMERGENCY MEDICINE
Payer: MEDICARE

## 2022-06-14 VITALS
TEMPERATURE: 98 F | RESPIRATION RATE: 17 BRPM | HEIGHT: 64 IN | OXYGEN SATURATION: 98 % | SYSTOLIC BLOOD PRESSURE: 131 MMHG | DIASTOLIC BLOOD PRESSURE: 68 MMHG | WEIGHT: 132.06 LBS | HEART RATE: 88 BPM

## 2022-06-14 VITALS
SYSTOLIC BLOOD PRESSURE: 145 MMHG | DIASTOLIC BLOOD PRESSURE: 69 MMHG | OXYGEN SATURATION: 97 % | HEART RATE: 85 BPM | RESPIRATION RATE: 18 BRPM | TEMPERATURE: 99 F

## 2022-06-14 LAB
ALBUMIN SERPL ELPH-MCNC: 2.7 G/DL — LOW (ref 3.5–5)
ALP SERPL-CCNC: 63 U/L — SIGNIFICANT CHANGE UP (ref 40–120)
ALT FLD-CCNC: 14 U/L DA — SIGNIFICANT CHANGE UP (ref 10–60)
ANION GAP SERPL CALC-SCNC: 12 MMOL/L — SIGNIFICANT CHANGE UP (ref 5–17)
APTT BLD: 25.4 SEC — LOW (ref 27.5–35.5)
AST SERPL-CCNC: 9 U/L — LOW (ref 10–40)
BASOPHILS # BLD AUTO: 0 K/UL — SIGNIFICANT CHANGE UP (ref 0–0.2)
BASOPHILS NFR BLD AUTO: 0 % — SIGNIFICANT CHANGE UP (ref 0–2)
BILIRUB SERPL-MCNC: 0.4 MG/DL — SIGNIFICANT CHANGE UP (ref 0.2–1.2)
BUN SERPL-MCNC: 23 MG/DL — HIGH (ref 7–18)
CALCIUM SERPL-MCNC: 8.3 MG/DL — LOW (ref 8.4–10.5)
CHLORIDE SERPL-SCNC: 102 MMOL/L — SIGNIFICANT CHANGE UP (ref 96–108)
CO2 SERPL-SCNC: 22 MMOL/L — SIGNIFICANT CHANGE UP (ref 22–31)
CREAT SERPL-MCNC: 1.21 MG/DL — SIGNIFICANT CHANGE UP (ref 0.5–1.3)
EGFR: 47 ML/MIN/1.73M2 — LOW
EOSINOPHIL # BLD AUTO: 0 K/UL — SIGNIFICANT CHANGE UP (ref 0–0.5)
EOSINOPHIL NFR BLD AUTO: 0 % — SIGNIFICANT CHANGE UP (ref 0–6)
GLUCOSE SERPL-MCNC: 84 MG/DL — SIGNIFICANT CHANGE UP (ref 70–99)
HCT VFR BLD CALC: 17 % — CRITICAL LOW (ref 34.5–45)
HGB BLD-MCNC: 5.5 G/DL — CRITICAL LOW (ref 11.5–15.5)
INR BLD: 1.09 RATIO — SIGNIFICANT CHANGE UP (ref 0.88–1.16)
LYMPHOCYTES # BLD AUTO: 1.19 K/UL — SIGNIFICANT CHANGE UP (ref 1–3.3)
LYMPHOCYTES # BLD AUTO: 39 % — SIGNIFICANT CHANGE UP (ref 13–44)
MCHC RBC-ENTMCNC: 30.1 PG — SIGNIFICANT CHANGE UP (ref 27–34)
MCHC RBC-ENTMCNC: 32.4 GM/DL — SIGNIFICANT CHANGE UP (ref 32–36)
MCV RBC AUTO: 92.9 FL — SIGNIFICANT CHANGE UP (ref 80–100)
MONOCYTES # BLD AUTO: 0 K/UL — SIGNIFICANT CHANGE UP (ref 0–0.9)
MONOCYTES NFR BLD AUTO: 0 % — LOW (ref 2–14)
NEUTROPHILS # BLD AUTO: 1.87 K/UL — SIGNIFICANT CHANGE UP (ref 1.8–7.4)
NEUTROPHILS NFR BLD AUTO: 61 % — SIGNIFICANT CHANGE UP (ref 43–77)
PLATELET # BLD AUTO: 12 K/UL — CRITICAL LOW (ref 150–400)
POTASSIUM SERPL-MCNC: 4.1 MMOL/L — SIGNIFICANT CHANGE UP (ref 3.5–5.3)
POTASSIUM SERPL-SCNC: 4.1 MMOL/L — SIGNIFICANT CHANGE UP (ref 3.5–5.3)
PROT SERPL-MCNC: 5.4 G/DL — LOW (ref 6–8.3)
PROTHROM AB SERPL-ACNC: 13 SEC — SIGNIFICANT CHANGE UP (ref 10.5–13.4)
RBC # BLD: 1.83 M/UL — LOW (ref 3.8–5.2)
RBC # FLD: 15.2 % — HIGH (ref 10.3–14.5)
SODIUM SERPL-SCNC: 136 MMOL/L — SIGNIFICANT CHANGE UP (ref 135–145)
WBC # BLD: 3.06 K/UL — LOW (ref 3.8–10.5)
WBC # FLD AUTO: 3.06 K/UL — LOW (ref 3.8–10.5)

## 2022-06-14 PROCEDURE — 86901 BLOOD TYPING SEROLOGIC RH(D): CPT

## 2022-06-14 PROCEDURE — 85610 PROTHROMBIN TIME: CPT

## 2022-06-14 PROCEDURE — 99284 EMERGENCY DEPT VISIT MOD MDM: CPT

## 2022-06-14 PROCEDURE — 85730 THROMBOPLASTIN TIME PARTIAL: CPT

## 2022-06-14 PROCEDURE — 86922 COMPATIBILITY TEST ANTIGLOB: CPT

## 2022-06-14 PROCEDURE — 36430 TRANSFUSION BLD/BLD COMPNT: CPT

## 2022-06-14 PROCEDURE — 80053 COMPREHEN METABOLIC PANEL: CPT

## 2022-06-14 PROCEDURE — P9040: CPT

## 2022-06-14 PROCEDURE — 99285 EMERGENCY DEPT VISIT HI MDM: CPT | Mod: 25

## 2022-06-14 PROCEDURE — P9073: CPT

## 2022-06-14 PROCEDURE — 36415 COLL VENOUS BLD VENIPUNCTURE: CPT

## 2022-06-14 PROCEDURE — 86900 BLOOD TYPING SEROLOGIC ABO: CPT

## 2022-06-14 PROCEDURE — 85025 COMPLETE CBC W/AUTO DIFF WBC: CPT

## 2022-06-14 PROCEDURE — 86870 RBC ANTIBODY IDENTIFICATION: CPT

## 2022-06-14 PROCEDURE — 86850 RBC ANTIBODY SCREEN: CPT

## 2022-06-14 NOTE — ED PROVIDER NOTE - PHYSICAL EXAMINATION
I have reviewed the triage vital signs.  Const: AAOx3, in NAD  Eyes: no conjunctival injection, pale conjunctiva   HENT: NCAT, Neck supple, oral mucosa moist  CV: RRR, +S1, S2  Resp: CTAB, no respiratory distress  GI: Abdomen soft, NTND, no guarding, no CVA tenderness  Extremities: No peripheral edema  Skin: Warm, well perfused, no rash  MSK: No gross deformities appreciated  Neuro: No focal sensory or motor deficits  Psych: Appropriate mood and affect

## 2022-06-14 NOTE — ED PROVIDER NOTE - OBJECTIVE STATEMENT
76F PMH MDS, HTN sent in by her heme/onc for transfusion in setting of low Hb/Plt on outpatient labs yesterday. Pt reports Hb 6.1, Plt 9. A/w mild SOB, dizziness, BL leg pain, states is typical of when her Hb is low. No bleeding, denies blood in stools/epistaxis, gum bleeding. No new ecchymosis. Denies falls/trauma, hitting her head, headache. No CP or LOC. States she started new chemo (Azacitidine) yesterday, and is another infusion at 4:30pm today.

## 2022-06-14 NOTE — ED PROVIDER NOTE - PATIENT PORTAL LINK FT
You can access the FollowMyHealth Patient Portal offered by [facility] by registering at the following website: [email or web address]. By joining [employer]’s FollowMyHealth portal, you will also be able to view your health information using other applications (apps) compatible with our system.

## 2022-06-14 NOTE — ED PROVIDER NOTE - CLINICAL SUMMARY MEDICAL DECISION MAKING FREE TEXT BOX
Spoke with patient and she verbalizes understanding. Scheduling number provided. Tell the patient she is due for follow-up mammogram.  Have her do when she can. I ordered. [place], PGY3 - 76F PMH MDS on chemo, receives frequent transfusions sent in by her heme/onc for prbc/plt transfusions in setting of low Hb/Plt on outpatient labs yesterday, reports mild SOB and dizziness. VSS. Plan for labs, d/w heme/onc and transfuse well baby c/s

## 2022-06-14 NOTE — ED PROVIDER NOTE - ATTENDING CONTRIBUTION TO CARE
I conducted a face-to-face interaction with patient and I agree with resident documentation and plan.  Pt with h/o MDS, sent by Dr. Mosley, oncologist, for transfusion of platelets and PRBC's; Pt has no symptoms  Exam:  General:  NAD, well-appearing  Lungs:  Nonlabored breathing, speaks in full sentences  A/P:  Will transfuse Plt and PRBC's as per Dr. Mosley's recommendation

## 2022-06-14 NOTE — ED PROVIDER NOTE - NSFOLLOWUPINSTRUCTIONS_ED_ALL_ED_FT
Follow up with Dr. Mosley.    You received 2 units of packed red blood cells and 1 unit of platelets.    Return to the Emergency Department for worsening or persistent symptoms, and/or ANY NEW OR CONCERNING SYMPTOMS, including chest pain, difficulty breathing, passing out, bleeding. If you have issues obtaining follow up, please call: [phone] to obtain a doctor or specialist who takes your insurance in your area.

## 2022-06-14 NOTE — ED ADULT NURSE NOTE - OBJECTIVE STATEMENT
Patient sent by MD for transfusion states hemoglobin was 6, usually gets transfusion and goes home, c/o left leg pain

## 2022-07-19 ENCOUNTER — EMERGENCY (EMERGENCY)
Facility: HOSPITAL | Age: 77
LOS: 1 days | Discharge: HOME | End: 2022-07-19
Attending: STUDENT IN AN ORGANIZED HEALTH CARE EDUCATION/TRAINING PROGRAM
Payer: MEDICARE

## 2022-07-19 VITALS
TEMPERATURE: 98 F | OXYGEN SATURATION: 100 % | DIASTOLIC BLOOD PRESSURE: 65 MMHG | HEART RATE: 90 BPM | WEIGHT: 126.1 LBS | RESPIRATION RATE: 18 BRPM | SYSTOLIC BLOOD PRESSURE: 158 MMHG | HEIGHT: 64 IN

## 2022-07-19 LAB
ALBUMIN SERPL ELPH-MCNC: 2.4 G/DL — LOW (ref 3.5–5)
ALLERGY+IMMUNOLOGY DIAG STUDY NOTE: SIGNIFICANT CHANGE UP
ALP SERPL-CCNC: 49 U/L — SIGNIFICANT CHANGE UP (ref 40–120)
ALT FLD-CCNC: 16 U/L DA — SIGNIFICANT CHANGE UP (ref 10–60)
ANION GAP SERPL CALC-SCNC: 16 MMOL/L — SIGNIFICANT CHANGE UP (ref 5–17)
ANISOCYTOSIS BLD QL: SLIGHT — SIGNIFICANT CHANGE UP
APTT BLD: 24.4 SEC — LOW (ref 27.5–35.5)
AST SERPL-CCNC: 15 U/L — SIGNIFICANT CHANGE UP (ref 10–40)
BASOPHILS # BLD AUTO: 0 K/UL — SIGNIFICANT CHANGE UP (ref 0–0.2)
BASOPHILS NFR BLD AUTO: 0 % — SIGNIFICANT CHANGE UP (ref 0–2)
BILIRUB SERPL-MCNC: 0.5 MG/DL — SIGNIFICANT CHANGE UP (ref 0.2–1.2)
BLD GP AB SCN SERPL QL: SIGNIFICANT CHANGE UP
BUN SERPL-MCNC: 30 MG/DL — HIGH (ref 7–18)
CALCIUM SERPL-MCNC: 8.6 MG/DL — SIGNIFICANT CHANGE UP (ref 8.4–10.5)
CHLORIDE SERPL-SCNC: 99 MMOL/L — SIGNIFICANT CHANGE UP (ref 96–108)
CO2 SERPL-SCNC: 22 MMOL/L — SIGNIFICANT CHANGE UP (ref 22–31)
CREAT SERPL-MCNC: 1.03 MG/DL — SIGNIFICANT CHANGE UP (ref 0.5–1.3)
EGFR: 56 ML/MIN/1.73M2 — LOW
EOSINOPHIL # BLD AUTO: 0 K/UL — SIGNIFICANT CHANGE UP (ref 0–0.5)
EOSINOPHIL NFR BLD AUTO: 0 % — SIGNIFICANT CHANGE UP (ref 0–6)
GLUCOSE SERPL-MCNC: 217 MG/DL — HIGH (ref 70–99)
HCT VFR BLD CALC: 14.7 % — CRITICAL LOW (ref 34.5–45)
HGB BLD-MCNC: 4.3 G/DL — CRITICAL LOW (ref 11.5–15.5)
HYPOCHROMIA BLD QL: SIGNIFICANT CHANGE UP
HYPOGRANULAR PLT: PRESENT
INR BLD: 1.07 RATIO — SIGNIFICANT CHANGE UP (ref 0.88–1.16)
LG PLATELETS BLD QL AUTO: SLIGHT — SIGNIFICANT CHANGE UP
LYMPHOCYTES # BLD AUTO: 0.92 K/UL — LOW (ref 1–3.3)
LYMPHOCYTES # BLD AUTO: 14 % — SIGNIFICANT CHANGE UP (ref 13–44)
MACROCYTES BLD QL: SLIGHT — SIGNIFICANT CHANGE UP
MANUAL SMEAR VERIFICATION: SIGNIFICANT CHANGE UP
MCHC RBC-ENTMCNC: 29.3 GM/DL — LOW (ref 32–36)
MCHC RBC-ENTMCNC: 29.7 PG — SIGNIFICANT CHANGE UP (ref 27–34)
MCV RBC AUTO: 101.4 FL — HIGH (ref 80–100)
MICROCYTES BLD QL: SLIGHT — SIGNIFICANT CHANGE UP
MONOCYTES # BLD AUTO: 0.33 K/UL — SIGNIFICANT CHANGE UP (ref 0–0.9)
MONOCYTES NFR BLD AUTO: 5 % — SIGNIFICANT CHANGE UP (ref 2–14)
NEUTROPHILS # BLD AUTO: 5.26 K/UL — SIGNIFICANT CHANGE UP (ref 1.8–7.4)
NEUTROPHILS NFR BLD AUTO: 80 % — HIGH (ref 43–77)
NRBC # BLD: 1 /100 — HIGH (ref 0–0)
PLAT MORPH BLD: ABNORMAL
PLATELET # BLD AUTO: 15 K/UL — CRITICAL LOW (ref 150–400)
PLATELET CLUMP BLD QL SMEAR: SLIGHT
POIKILOCYTOSIS BLD QL AUTO: SLIGHT — SIGNIFICANT CHANGE UP
POLYCHROMASIA BLD QL SMEAR: SLIGHT — SIGNIFICANT CHANGE UP
POTASSIUM SERPL-MCNC: 3 MMOL/L — LOW (ref 3.5–5.3)
POTASSIUM SERPL-SCNC: 3 MMOL/L — LOW (ref 3.5–5.3)
PROT SERPL-MCNC: 5 G/DL — LOW (ref 6–8.3)
PROTHROM AB SERPL-ACNC: 12.8 SEC — SIGNIFICANT CHANGE UP (ref 10.5–13.4)
RBC # BLD: 1.45 M/UL — LOW (ref 3.8–5.2)
RBC # FLD: 18.6 % — HIGH (ref 10.3–14.5)
RBC BLD AUTO: ABNORMAL
SMUDGE CELLS # BLD: PRESENT — SIGNIFICANT CHANGE UP
SODIUM SERPL-SCNC: 137 MMOL/L — SIGNIFICANT CHANGE UP (ref 135–145)
VARIANT LYMPHS # BLD: 1 % — SIGNIFICANT CHANGE UP (ref 0–6)
WBC # BLD: 6.57 K/UL — SIGNIFICANT CHANGE UP (ref 3.8–10.5)
WBC # FLD AUTO: 6.57 K/UL — SIGNIFICANT CHANGE UP (ref 3.8–10.5)

## 2022-07-19 PROCEDURE — 93010 ELECTROCARDIOGRAM REPORT: CPT

## 2022-07-19 PROCEDURE — 99284 EMERGENCY DEPT VISIT MOD MDM: CPT

## 2022-07-19 NOTE — ED ADULT NURSE NOTE - NSIMPLEMENTINTERV_GEN_ALL_ED
Implemented All Fall with Harm Risk Interventions:  [place] to call system. Call bell, personal items and telephone within reach. Instruct patient to call for assistance. Room bathroom lighting operational. Non-slip footwear when patient is off stretcher. Physically safe environment: no spills, clutter or unnecessary equipment. Stretcher in lowest position, wheels locked, appropriate side rails in place. Provide visual cue, wrist band, yellow gown, etc. Monitor gait and stability. Monitor for mental status changes and reorient to person, place, and time. Review medications for side effects contributing to fall risk. Reinforce activity limits and safety measures with patient and family. Provide visual clues: red socks.

## 2022-07-19 NOTE — ED PROVIDER NOTE - CLINICAL SUMMARY MEDICAL DECISION MAKING FREE TEXT BOX
77 year-old female presenting with low hemoglobin.  Spoke with Dr. Mosley, low concern for GI bleed as patient has a history of hemorrhoids.  Blood pressure slightly low on arrival, patient placed on the monitor and large-bore IVs placed.  Plan for transfusion and will likely discharge.

## 2022-07-19 NOTE — ED ADULT NURSE NOTE - OBJECTIVE STATEMENT
patient BIBA c/o weakness since this morning, patient reports seeing blood on toilet paper after BM.

## 2022-07-19 NOTE — ED PROVIDER NOTE - OBJECTIVE STATEMENT
77-year-old female, past medical history of hypertension, MDS, presenting with low hemoglobin. 77-year-old female, past medical history of hypertension, MDS, presenting with low hemoglobin. Patient reports she had some bleeding after she wiped when using the bathroom today but denies any bloody bowel movements.  Reports she has been feeling increasingly weak and fatigued and her daughter reports she looks pale which typically means she needs a blood transfusion she had a colonoscopy and endoscopy done within the last 6 months without any emergent findings.

## 2022-07-19 NOTE — ED PROVIDER NOTE - NSFOLLOWUPCLINICS_GEN_ALL_ED_FT
[facility]  Internal Medicine  [address] [place] [address]  Phone: [phone]  Fax: [phone]

## 2022-07-19 NOTE — ED PROVIDER NOTE - PROGRESS NOTE DETAILS
Aneesh YATES: Received sign out from Dr. Escobar.  Endorsed to discharge pt after blood transfusion.  Pt completed transfusions, no complaints, feels well and called her daughter to accompany her home.  Pt is well appearing, has no new complaints and able to walk with normal gait. Pt is stable for discharge and follow up with medical doctor. Pt educated on care and need for follow up. Discussed anticipatory guidance and return precautions. Questions answered. I had a detailed discussion with the patient and/or guardian regarding the historical points, exam findings, and any diagnostic results supporting the discharge diagnosis.

## 2022-07-19 NOTE — ED PROVIDER NOTE - NSFOLLOWUPINSTRUCTIONS_ED_ALL_ED_FT
You were seen in the emergency department for anemia requiring a blood transfusion.    Please follow-up with your hematologist in the next 24 to 48 hours.    If you have any worsening symptoms, chest pain, shortness of breath, headache, or dizziness, please return to the emergency department.

## 2022-07-19 NOTE — ED PROVIDER NOTE - PATIENT PORTAL LINK FT
You can access the FollowMyHealth Patient Portal offered by [facility] by registering at the following website: [email or web address]. By joining [employer]’s FollowMyHealth portal, you will also be able to view your health information using other applications (apps) compatible with our system.

## 2022-07-19 NOTE — ED PROVIDER NOTE - PHYSICAL EXAMINATION
General: well appearing female, no acute distress   HEENT: normocephalic, atraumatic   Respiratory: normal work of breathing  MSK: no swelling or tenderness of lower extremities, moving all extremities spontaneously   Skin: warm, dry, pale  Neuro: A&Ox3  Psych: appropriate affect

## 2022-07-20 ENCOUNTER — INPATIENT (INPATIENT)
Facility: HOSPITAL | Age: 77
LOS: 4 days | Discharge: HOME | DRG: 377 | End: 2022-07-25
Attending: INTERNAL MEDICINE | Admitting: INTERNAL MEDICINE
Payer: MEDICARE

## 2022-07-20 VITALS
RESPIRATION RATE: 18 BRPM | DIASTOLIC BLOOD PRESSURE: 87 MMHG | HEART RATE: 64 BPM | OXYGEN SATURATION: 100 % | TEMPERATURE: 98 F | SYSTOLIC BLOOD PRESSURE: 124 MMHG

## 2022-07-20 VITALS
WEIGHT: 126.1 LBS | DIASTOLIC BLOOD PRESSURE: 50 MMHG | HEART RATE: 144 BPM | RESPIRATION RATE: 18 BRPM | SYSTOLIC BLOOD PRESSURE: 80 MMHG | HEIGHT: 64 IN | OXYGEN SATURATION: 98 % | TEMPERATURE: 99 F

## 2022-07-20 DIAGNOSIS — K92.2 GASTROINTESTINAL HEMORRHAGE, UNSPECIFIED: ICD-10-CM

## 2022-07-20 DIAGNOSIS — D46.9 MYELODYSPLASTIC SYNDROME, UNSPECIFIED: ICD-10-CM

## 2022-07-20 DIAGNOSIS — I48.91 UNSPECIFIED ATRIAL FIBRILLATION: ICD-10-CM

## 2022-07-20 DIAGNOSIS — I10 ESSENTIAL (PRIMARY) HYPERTENSION: ICD-10-CM

## 2022-07-20 DIAGNOSIS — Z29.9 ENCOUNTER FOR PROPHYLACTIC MEASURES, UNSPECIFIED: ICD-10-CM

## 2022-07-20 DIAGNOSIS — I95.9 HYPOTENSION, UNSPECIFIED: ICD-10-CM

## 2022-07-20 DIAGNOSIS — D64.9 ANEMIA, UNSPECIFIED: ICD-10-CM

## 2022-07-20 LAB
ALBUMIN SERPL ELPH-MCNC: 2.3 G/DL — LOW (ref 3.5–5)
ALP SERPL-CCNC: 44 U/L — SIGNIFICANT CHANGE UP (ref 40–120)
ALT FLD-CCNC: 16 U/L DA — SIGNIFICANT CHANGE UP (ref 10–60)
ANION GAP SERPL CALC-SCNC: 13 MMOL/L — SIGNIFICANT CHANGE UP (ref 5–17)
ANISOCYTOSIS BLD QL: SLIGHT — SIGNIFICANT CHANGE UP
APTT BLD: 23.5 SEC — LOW (ref 27.5–35.5)
AST SERPL-CCNC: 18 U/L — SIGNIFICANT CHANGE UP (ref 10–40)
BASOPHILS # BLD AUTO: 0 K/UL — SIGNIFICANT CHANGE UP (ref 0–0.2)
BASOPHILS NFR BLD AUTO: 0 % — SIGNIFICANT CHANGE UP (ref 0–2)
BILIRUB SERPL-MCNC: 0.9 MG/DL — SIGNIFICANT CHANGE UP (ref 0.2–1.2)
BLD GP AB SCN SERPL QL: SIGNIFICANT CHANGE UP
BUN SERPL-MCNC: 26 MG/DL — HIGH (ref 7–18)
CALCIUM SERPL-MCNC: 8.3 MG/DL — LOW (ref 8.4–10.5)
CHLORIDE SERPL-SCNC: 102 MMOL/L — SIGNIFICANT CHANGE UP (ref 96–108)
CO2 SERPL-SCNC: 22 MMOL/L — SIGNIFICANT CHANGE UP (ref 22–31)
CREAT SERPL-MCNC: 0.96 MG/DL — SIGNIFICANT CHANGE UP (ref 0.5–1.3)
EGFR: 61 ML/MIN/1.73M2 — SIGNIFICANT CHANGE UP
EOSINOPHIL # BLD AUTO: 0 K/UL — SIGNIFICANT CHANGE UP (ref 0–0.5)
EOSINOPHIL NFR BLD AUTO: 0 % — SIGNIFICANT CHANGE UP (ref 0–6)
GIANT PLATELETS BLD QL SMEAR: PRESENT — SIGNIFICANT CHANGE UP
GLUCOSE SERPL-MCNC: 145 MG/DL — HIGH (ref 70–99)
HCT VFR BLD CALC: 20.2 % — CRITICAL LOW (ref 34.5–45)
HGB BLD-MCNC: 6.5 G/DL — CRITICAL LOW (ref 11.5–15.5)
HYPOCHROMIA BLD QL: SLIGHT — SIGNIFICANT CHANGE UP
INR BLD: 1.08 RATIO — SIGNIFICANT CHANGE UP (ref 0.88–1.16)
LG PLATELETS BLD QL AUTO: SLIGHT — SIGNIFICANT CHANGE UP
LYMPHOCYTES # BLD AUTO: 1.9 K/UL — SIGNIFICANT CHANGE UP (ref 1–3.3)
LYMPHOCYTES # BLD AUTO: 22 % — SIGNIFICANT CHANGE UP (ref 13–44)
MACROCYTES BLD QL: SLIGHT — SIGNIFICANT CHANGE UP
MAGNESIUM SERPL-MCNC: 2.1 MG/DL — SIGNIFICANT CHANGE UP (ref 1.6–2.6)
MANUAL SMEAR VERIFICATION: SIGNIFICANT CHANGE UP
MCHC RBC-ENTMCNC: 29.1 PG — SIGNIFICANT CHANGE UP (ref 27–34)
MCHC RBC-ENTMCNC: 32.2 GM/DL — SIGNIFICANT CHANGE UP (ref 32–36)
MCV RBC AUTO: 90.6 FL — SIGNIFICANT CHANGE UP (ref 80–100)
MICROCYTES BLD QL: SLIGHT — SIGNIFICANT CHANGE UP
MONOCYTES # BLD AUTO: 0.26 K/UL — SIGNIFICANT CHANGE UP (ref 0–0.9)
MONOCYTES NFR BLD AUTO: 3 % — SIGNIFICANT CHANGE UP (ref 2–14)
NEUTROPHILS # BLD AUTO: 6.49 K/UL — SIGNIFICANT CHANGE UP (ref 1.8–7.4)
NEUTROPHILS NFR BLD AUTO: 75 % — SIGNIFICANT CHANGE UP (ref 43–77)
NEUTS HYPERSEG # BLD: PRESENT — SIGNIFICANT CHANGE UP
NRBC # BLD: 1 /100 — HIGH (ref 0–0)
NT-PROBNP SERPL-SCNC: 5170 PG/ML — HIGH (ref 0–450)
OB PNL STL: NEGATIVE — SIGNIFICANT CHANGE UP
OB PNL STL: POSITIVE
PLAT MORPH BLD: NORMAL — SIGNIFICANT CHANGE UP
PLATELET # BLD AUTO: 12 K/UL — CRITICAL LOW (ref 150–400)
PLATELET COUNT - ESTIMATE: ABNORMAL
POIKILOCYTOSIS BLD QL AUTO: SLIGHT — SIGNIFICANT CHANGE UP
POLYCHROMASIA BLD QL SMEAR: SLIGHT — SIGNIFICANT CHANGE UP
POTASSIUM SERPL-MCNC: 3 MMOL/L — LOW (ref 3.5–5.3)
POTASSIUM SERPL-SCNC: 3 MMOL/L — LOW (ref 3.5–5.3)
PROT SERPL-MCNC: 4.8 G/DL — LOW (ref 6–8.3)
PROTHROM AB SERPL-ACNC: 12.9 SEC — SIGNIFICANT CHANGE UP (ref 10.5–13.4)
RBC # BLD: 2.23 M/UL — LOW (ref 3.8–5.2)
RBC # FLD: 17.9 % — HIGH (ref 10.3–14.5)
RBC BLD AUTO: ABNORMAL
SARS-COV-2 RNA SPEC QL NAA+PROBE: DETECTED
SCHISTOCYTES BLD QL AUTO: SLIGHT — SIGNIFICANT CHANGE UP
SODIUM SERPL-SCNC: 137 MMOL/L — SIGNIFICANT CHANGE UP (ref 135–145)
T3 SERPL-MCNC: 84 NG/DL — SIGNIFICANT CHANGE UP (ref 80–200)
T4 AB SER-ACNC: 8.9 UG/DL — SIGNIFICANT CHANGE UP (ref 4.6–12)
TROPONIN I, HIGH SENSITIVITY RESULT: 48 NG/L — SIGNIFICANT CHANGE UP
TSH SERPL-MCNC: 1.48 UU/ML — SIGNIFICANT CHANGE UP (ref 0.34–4.82)
WBC # BLD: 8.65 K/UL — SIGNIFICANT CHANGE UP (ref 3.8–10.5)
WBC # FLD AUTO: 8.65 K/UL — SIGNIFICANT CHANGE UP (ref 3.8–10.5)

## 2022-07-20 PROCEDURE — 86901 BLOOD TYPING SEROLOGIC RH(D): CPT

## 2022-07-20 PROCEDURE — P9040: CPT

## 2022-07-20 PROCEDURE — 85610 PROTHROMBIN TIME: CPT

## 2022-07-20 PROCEDURE — 99284 EMERGENCY DEPT VISIT MOD MDM: CPT | Mod: 25

## 2022-07-20 PROCEDURE — 85730 THROMBOPLASTIN TIME PARTIAL: CPT

## 2022-07-20 PROCEDURE — 99291 CRITICAL CARE FIRST HOUR: CPT

## 2022-07-20 PROCEDURE — 93005 ELECTROCARDIOGRAM TRACING: CPT

## 2022-07-20 PROCEDURE — 74174 CTA ABD&PLVS W/CONTRAST: CPT | Mod: 26

## 2022-07-20 PROCEDURE — 85025 COMPLETE CBC W/AUTO DIFF WBC: CPT

## 2022-07-20 PROCEDURE — 86870 RBC ANTIBODY IDENTIFICATION: CPT

## 2022-07-20 PROCEDURE — 36415 COLL VENOUS BLD VENIPUNCTURE: CPT

## 2022-07-20 PROCEDURE — 86850 RBC ANTIBODY SCREEN: CPT

## 2022-07-20 PROCEDURE — 71045 X-RAY EXAM CHEST 1 VIEW: CPT | Mod: 26

## 2022-07-20 PROCEDURE — 80053 COMPREHEN METABOLIC PANEL: CPT

## 2022-07-20 PROCEDURE — 36430 TRANSFUSION BLD/BLD COMPNT: CPT

## 2022-07-20 PROCEDURE — 86900 BLOOD TYPING SEROLOGIC ABO: CPT

## 2022-07-20 PROCEDURE — 86922 COMPATIBILITY TEST ANTIGLOB: CPT

## 2022-07-20 RX ORDER — DILTIAZEM HCL 120 MG
5 CAPSULE, EXT RELEASE 24 HR ORAL ONCE
Refills: 0 | Status: DISCONTINUED | OUTPATIENT
Start: 2022-07-20 | End: 2022-07-20

## 2022-07-20 RX ORDER — MYCOPHENOLATE MOFETIL 250 MG/1
1 CAPSULE ORAL
Qty: 0 | Refills: 0 | DISCHARGE

## 2022-07-20 RX ORDER — PANTOPRAZOLE SODIUM 20 MG/1
40 TABLET, DELAYED RELEASE ORAL EVERY 12 HOURS
Refills: 0 | Status: DISCONTINUED | OUTPATIENT
Start: 2022-07-20 | End: 2022-07-25

## 2022-07-20 RX ORDER — FUROSEMIDE 40 MG
40 TABLET ORAL ONCE
Refills: 0 | Status: COMPLETED | OUTPATIENT
Start: 2022-07-20 | End: 2022-07-20

## 2022-07-20 RX ORDER — AZACITIDINE FOR 100 MG/1
2 INJECTION, POWDER, LYOPHILIZED, FOR SOLUTION INTRAVENOUS; SUBCUTANEOUS
Qty: 0 | Refills: 0 | DISCHARGE

## 2022-07-20 RX ORDER — DILTIAZEM HCL 120 MG
5 CAPSULE, EXT RELEASE 24 HR ORAL ONCE
Refills: 0 | Status: DISCONTINUED | OUTPATIENT
Start: 2022-07-20 | End: 2022-07-21

## 2022-07-20 RX ORDER — SODIUM CHLORIDE 9 MG/ML
1000 INJECTION INTRAMUSCULAR; INTRAVENOUS; SUBCUTANEOUS ONCE
Refills: 0 | Status: COMPLETED | OUTPATIENT
Start: 2022-07-20 | End: 2022-07-20

## 2022-07-20 RX ORDER — POTASSIUM CHLORIDE 20 MEQ
10 PACKET (EA) ORAL ONCE
Refills: 0 | Status: COMPLETED | OUTPATIENT
Start: 2022-07-20 | End: 2022-07-20

## 2022-07-20 RX ORDER — PANTOPRAZOLE SODIUM 20 MG/1
80 TABLET, DELAYED RELEASE ORAL ONCE
Refills: 0 | Status: COMPLETED | OUTPATIENT
Start: 2022-07-20 | End: 2022-07-20

## 2022-07-20 RX ADMIN — SODIUM CHLORIDE 1000 MILLILITER(S): 9 INJECTION INTRAMUSCULAR; INTRAVENOUS; SUBCUTANEOUS at 14:33

## 2022-07-20 RX ADMIN — Medication 40 MILLIGRAM(S): at 17:18

## 2022-07-20 RX ADMIN — PANTOPRAZOLE SODIUM 40 MILLIGRAM(S): 20 TABLET, DELAYED RELEASE ORAL at 22:43

## 2022-07-20 RX ADMIN — Medication 100 MILLIEQUIVALENT(S): at 16:06

## 2022-07-20 RX ADMIN — PANTOPRAZOLE SODIUM 80 MILLIGRAM(S): 20 TABLET, DELAYED RELEASE ORAL at 14:28

## 2022-07-20 NOTE — ED PROVIDER NOTE - PROGRESS NOTE DETAILS
As per daughter, pt was admitted to [facility] an episode of ?transient A. fib 2 weeks ago for blood transfusion/ UTI Pt with rapid A. fib, hypotension, now B/P 124/87, GIB, pt's currently receiving blood transfusion, , will give Cardizem.  Case d/w Dr. Dimitri boswell, advised to admit to Dr. Jacobo.  Pt has no repeat episodes of melena since in ED pt with positive COVID on 7/1, it's been 20 days, no isolation needed pt with positive COVID on 7/1 while Admitted @ [facility], it's been 20 days, no isolation needed

## 2022-07-20 NOTE — H&P ADULT - HISTORY OF PRESENT ILLNESS
Patient is a 78 yo female who lives at home with bedbound , and has 2 HHA, PMH of MDS initially in remission , now mutated, and complicated by severe anemia and thrombocytopenia requiring blood transfusions, last of which was yesterday, 2 U, for Hgb of 4, dc home stable,  who comes in complaining of Melena, described as dark diarrhea, following hard stools the day prior.    OF note patient also had transfusion 2 weeks ago, at [facility] where she was found with hgb of 3. Patient was also found with one episode of A-fib RVR which resolved, as well as asymptomatic bacteriuria which was treated.    Now presents with dark stools, lightheadedness, low appetite, not eaten since 4 dyas ago, nausea with dry heaves, no vomiting, denies fever, chills, changes in urine output.    In the ED patient was AF, and sats 98% on RA, however  BP 80/50 EKG showing A-fib and   Exam: notable for cachectic pleasant female, with no distress, pale skin, but benign exam overall  Labs: showed Hgb of 6.5, K 3, BNP 5K, COVID +Ve (not new) occult blood was +Ve    CODE fusion was called and 2U of blood was given and a liter bolus  For the A fib RVR diltiazem was given at 5mg   K was replaced  lasix and CXR ordered  due to s/o volume overload  and patient was admitted for GI bleed

## 2022-07-20 NOTE — ED PROVIDER NOTE - CARE PLAN
Principal Discharge DX:	Hypotension  Secondary Diagnosis:	Rapid atrial fibrillation  Secondary Diagnosis:	GIB (gastrointestinal bleeding)   1

## 2022-07-20 NOTE — H&P ADULT - ATTENDING COMMENTS
Patient is a 78 yo female who lives at home with bedbound , and has 2 HHA, PMH of MDS initially in remission , now mutated, and complicated by severe anemia and thrombocytopenia requiring blood transfusions, last of which was yesterday, 2 U, for Hgb of 4, dc home stable,  who comes in complaining of Melena, described as dark diarrhea, following hard stools the day prior.    OF note patient also had transfusion 2 weeks ago, at [facility] where she was found with hgb of 3. Patient was also found with one episode of A-fib RVR which resolved, as well as asymptomatic bacteriuria which was treated.    Now presents with dark stools, lightheadedness, low appetite, not eaten since 4 days ago, nausea with dry heaves, no vomiting, denies fever, chills, changes in urine output.    In the ED patient was AF, and sats 98% on RA, however  BP 80/50 EKG showing A-fib and   Exam: notable for cachectic pleasant female, with no distress, pale skin, but benign exam overall  Labs: showed Hgb of 6.5, K 3, BNP 5K, COVID +Ve (not new) occult blood was +Ve    CODE fusion was called and 2U of blood was given and a liter bolus  For the A fib RVR diltiazem was given at 5mg   K was replaced  lasix and CXR ordered  due to s/o volume overload  and patient was admitted for GI bleed    # CASE D/W DAUGHTER AT BEDSIDE; CONVEYED THAT PROGNOSIS IS GUARDED, PATIENT AND FAMILY VERBALIZED UNDERSTANDING    # R/O GI BLEED  # NORMOCYTIC ANEMIA, MDS  # THROMBOCYTOPENIA   - GIVEN 2 UNITS PRBC; PATIENT WAS ALSO TRANSFUSED 7/19/22 , THUS PLANNED FOR 1 UNIT OF FFP AND PLATELETS  - F/U POST TRANSFUSION CBC, MONITOR SYMPTOMS CLOSELY DURING TRANSFUSION  - TREND HGB, PPI IV BID  - F/U CT A/P  - TRANSFUSION THRESHOLD HGB < 7  - TRANSFUSION THRESHOLD FOR PLT < 10 [IF NO BLEEDING], < 50 [IF BLEEDING]  - GASTROENTEROLOGY CONSULT  - HEME/ONC CONSULT    - LOW THRESHOLD FOR CRITICAL CARE CONSULT, D/W RESIDENT TEAM    # AFIB W/ RVR - IMPROVED AFTER TRANSFUSION  - PRN RATE CONTROL  - MONITOR ON TELEMETRY  - F/U ECHOCARDIOGRAM  - HOLD A/C GIVEN CONCERN FOR BLEEDING; DISCUSSED WITH PATIENT AND WITH DAUGHTER REGARDING BLEEDING RISK VS. STROKE RISK. BOTH VERBALIZED UNDERSTANDING   - CARDIOLOGY CONSULT    # COVID19 INFECTION  - PATIENT IS S/P ? MEDICATION [ SUSPECT PAXLOVID PER DAUGHTER ]  - MONITOR PO2, MONITOR INFLAMMATORY MARKERS  - CONTACT AND DROPLET ISOLATION PRECAUTIONS    # SUSPECT MODERATE PROTEIN CALORIE MALNUTRITION  - NUTRITIONAL SUPPLEMENTATION    # HYPOKALEMIA  - REPLETE WITH SUPPLEMENT    # GI PPX; AT BOOTS

## 2022-07-20 NOTE — ED PROVIDER NOTE - OBJECTIVE STATEMENT
77 y.o. female with h/o myelodysplastic syndrome, s/p transfusion yest., BIBA as per daughter, pt with black stool x1 this am, weakness, paleness, lightheadedness, dec. appetite, nausea, no vomiting, no LOC, CP, mild lower abd pain, nonradiating,

## 2022-07-20 NOTE — H&P ADULT - PROBLEM SELECTOR PLAN 3
Patient has MDS initally on remission, now recurring  f/u dr Mosley, who is referring them for evaluation at  [facility]   However now it is complicated by anemia requiring frequent transfusions  will be dc once hgb is stable to c/w follow up  Per [facility] open to hospice in the future- if prognosis is poor
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
## 2022-07-20 NOTE — H&P ADULT - NSHPREVIEWOFSYSTEMS_GEN_ALL_CORE
After Visit Summary   7/17/2017    Sha Vidal    MRN: [id number]           Patient Information     Date Of Birth          1969        Visit Information        Provider Department      7/17/2017 8:50 AM Anita Quiros PA-C [facility]        Today's Diagnoses     Routine general medical examination at a health care facility    -  1    Screening for breast cancer        Hypothyroidism, unspecified type        Adjustment disorder with mixed anxiety and depressed mood        Overweight        Other migraine without status migrainosus, not intractable        Supraumbilical hernia        Uterine leiomyoma, unspecified location          Care Instructions      Preventive Health Recommendations  Female Ages 40 to 49    Yearly exam:     See your health care provider every year in order to  1. Review health changes.   2. Discuss preventive care.    3. Review your medicines if your doctor prescribed any.      Get a Pap test every three years (unless you have an abnormal result and your provider advises testing more often).      If you get Pap tests with HPV test, you only need to test every 5 years, unless you have an abnormal result. You do not need a Pap test if your uterus was removed (hysterectomy) and you have not had cancer.      You should be tested each year for STDs (sexually transmitted diseases), if you're at risk.       Ask your doctor if you should have a mammogram.      Have a colonoscopy (test for colon cancer) if someone in your family has had colon cancer or polyps before age 50.       Have a cholesterol test every 5 years.       Have a diabetes test (fasting glucose) after age 45. If you are at risk for diabetes, you should have this test every 3 years.    Shots: Get a flu shot each year. Get a tetanus shot every 10 years.     Nutrition:     Eat at least 5 servings of fruits and vegetables each day.    Eat whole-grain bread, whole-wheat pasta and brown rice  instead of white grains and rice.    Talk to your provider about Calcium and Vitamin D.     Lifestyle    Exercise at least 150 minutes a week (an average of 30 minutes a day, 5 days a week). This will help you control your weight and prevent disease.    Limit alcohol to one drink per day.    No smoking.     Wear sunscreen to prevent skin cancer.    See your dentist every six months for an exam and cleaning.          Follow-ups after your visit        Additional Services     OB/GYN REFERRAL       Your provider has referred you to:  [facility] [facility] [phone]   [email or web address]      Please be aware that coverage of these services is subject to the terms and limitations of your health insurance plan.  Call member services at your health plan with any benefit or coverage questions.      Please bring the following with you to your appointment:    (1) Any X-Rays, CTs or MRIs which have been performed.  Contact the facility where they were done to arrange for  prior to your scheduled appointment.   (2) List of current medications   (3) This referral request   (4) Any documents/labs given to you for this referral                  Your next 10 appointments already scheduled     Jul 24, 2017 10:00 AM CDT   CONSULT with Damon Hall MD   [facility]    [address]  [facility] [address] [phone]              Future tests that were ordered for you today     Open Future Orders        Priority Expected Expires Ordered    *MA Screening Digital Bilateral Routine  7/17/2018 7/17/2017            Who to contact     If you have questions or need follow up information about today's clinic visit or your schedule please contact [facility] directly at [phone].  Normal or non-critical lab and imaging results will be communicated to you by MyChart, letter or phone  "within 4 business days after the clinic has received the results. If you do not hear from us within 7 days, please contact the clinic through [employer] or phone. If you have a critical or abnormal lab result, we will notify you by phone as soon as possible.  Submit refill requests through [employer] or call your pharmacy and they will forward the refill request to us. Please allow 3 business days for your refill to be completed.          Additional Information About Your Visit        [employer] Information     [employer] gives you secure access to your electronic health record. If you see a primary care provider, you can also send messages to your care team and make appointments. If you have questions, please call your primary care clinic.  If you do not have a primary care provider, please call [phone] and they will assist you.        Care EveryWhere ID     This is your Care EveryWhere ID. This could be used by other organizations to access your [facility] medical records  [id number]        Your Vitals Were     Pulse Temperature Height Last Period BMI (Body Mass Index)       66 97.8  F (36.6  C) (Tympanic) 5' 2\" (1.575 m) 07/08/2017 29.08 kg/m2        Blood Pressure from Last 3 Encounters:   07/17/17 104/74   07/09/17 120/82   05/01/17 120/80    Weight from Last 3 Encounters:   07/17/17 159 lb (72.1 kg)   07/09/17 155 lb (70.3 kg)   05/01/17 162 lb (73.5 kg)              We Performed the Following     CBC with platelets     Comprehensive metabolic panel     Lipid panel reflex to direct LDL     OB/GYN REFERRAL     TSH with free T4 reflex     Vitamin D Deficiency          Today's Medication Changes          These changes are accurate as of: 7/17/17  9:06 AM.  If you have any questions, ask your nurse or doctor.               These medicines have changed or have updated prescriptions.        Dose/Directions    FLUoxetine 10 MG capsule   Commonly known as:  PROzac   This may have changed:  See the new instructions.   Used " for:  Adjustment disorder with mixed anxiety and depressed mood   Changed by:  Anita Quiros PA-C        TAKE 1 CAPSULE BY MOUTH  DAILY DUE FOR AN ANNUAL  VISIT IN JULY. PLEASE CALL  CLINIC TO SCHEDULE.   Quantity:  90 capsule   Refills:  3            Where to get your medicines      These medications were sent to [facility]/pharmacy #[id number] - [place], [place] - [address]  [address] [facility] [address]     Phone:  [phone]     FLUoxetine 10 MG capsule         Some of these will need a paper prescription and others can be bought over the counter.  Ask your nurse if you have questions.     Bring a paper prescription for each of these medications     HYDROcodone-acetaminophen 5-325 MG per tablet                Primary Care Provider Office Phone # Fax #    Anita Quiros PA-C [phone]       [facility] [address] [facility] [address]        Equal Access to Services     ROBSON CONLEY : Hadii aad ku hadasho Soomaali, waaxda luqadaha, qaybta kaalmada adeegyada, waxay keeshain hayaan alejandrina bland . So [facility] [phone].    ATENCIÓN: Si habla español, tiene a collins disposición servicios gratuitos de asistencia lingüística. Llame al [phone].    We comply with applicable federal civil rights laws and [place] laws. We do not discriminate on the basis of race, color, national origin, age, disability sex, sexual orientation or gender identity.            Thank you!     Thank you for choosing [facility]  for your care. Our goal is always to provide you with excellent care. Hearing back from our patients is one way we can continue to improve our services. Please take a few minutes to complete the written survey that you may receive in the mail after your visit with us. Thank you!             Your Updated Medication List - Protect others around you: Learn how to safely use, store and throw away your medicines at www.disposemymeds.org.           This list is accurate as of: 7/17/17  9:06 AM.  Always use your most recent med list.                   Brand Name Dispense Instructions for use Diagnosis    cetirizine 10 MG tablet    zyrTEC     Take 10 mg by mouth daily        D 2000 2000 UNITS tablet   Generic drug:  cholecalciferol      Take 2,000 Units by mouth        FLUoxetine 10 MG capsule    PROzac    90 capsule    TAKE 1 CAPSULE BY MOUTH  DAILY DUE FOR AN ANNUAL  VISIT IN JULY. PLEASE CALL  CLINIC TO SCHEDULE.    Adjustment disorder with mixed anxiety and depressed mood       HYDROcodone-acetaminophen 5-325 MG per tablet    NORCO    20 tablet    Take 1-2 tablets by mouth every 6 hours as needed for moderate to severe pain    Supraumbilical hernia       levothyroxine 25 MCG tablet    SYNTHROID/LEVOTHROID    90 tablet    Take 1 tablet (25 mcg) by mouth daily    Hypothyroidism, unspecified type       MULTI-VITAMINS Tabs      Take 1 tablet by mouth        phentermine 37.5 MG tablet    ADIPEX-P    31 tablet    Take 1 tablet (37.5 mg) by mouth every morning (before breakfast)    Hypothyroidism due to Hashimoto's thyroiditis, CARDIOVASCULAR SCREENING; LDL GOAL LESS THAN 160, Family history of diabetes mellitus       SUMAtriptan 100 MG tablet    IMITREX    9 tablet    Take 1 tablet (100 mg) by mouth at onset of headache for migraine May repeat in 2 hours if needed: max 2/day; average number of headaches  2 yearly    Migraine without aura and without status migrainosus, not intractable          REVIEW OF SYSTEMS:    CONSTITUTIONAL: No weakness, fevers or chills  EYES/ENT: No visual changes;  No vertigo or throat pain   NECK: No pain or stiffness  RESPIRATORY: No cough, wheezing, hemoptysis; No shortness of breath  CARDIOVASCULAR: No chest pain + palpitations  GASTROINTESTINAL: no abdominal pain, dark stools.  GENITOURINARY: No dysuria, frequency or hematuria  NEUROLOGICAL: No numbness or weakness  SKIN: No itching, rashes

## 2022-07-20 NOTE — H&P ADULT - NSHPLABSRESULTS_GEN_ALL_CORE
6.5    8.65  )-----------( 12       ( 20 Jul 2022 13:55 )             20.2     07-20    137  |  102  |  26<H>  ----------------------------<  145<H>  3.0<L>   |  22  |  0.96    Ca    8.3<L>      20 Jul 2022 13:55  Mg     2.1     07-20    TPro  4.8<L>  /  Alb  2.3<L>  /  TBili  0.9  /  DBili  x   /  AST  18  /  ALT  16  /  AlkPhos  44  07-20        LIVER FUNCTIONS - ( 20 Jul 2022 13:55 )  Alb: 2.3 g/dL / Pro: 4.8 g/dL / ALK PHOS: 44 U/L / ALT: 16 U/L DA / AST: 18 U/L / GGT: x           PT/INR - ( 20 Jul 2022 13:55 )   PT: 12.9 sec;   INR: 1.08 ratio         PTT - ( 20 Jul 2022 13:55 )  PTT:23.5 sec                EKG: A fib RVR    RADIOLOGY STUDIES:

## 2022-07-20 NOTE — H&P ADULT - PROBLEM SELECTOR PLAN 2
New onset 2 weeks ago at [facility]  In the setting of profound anemia might be reactive  Patient HR has become controlled after bolus and blood transfusion  -PRN cardizem if bp allows  -May need small bolus with the cardizem  -Low threshold for ICU  -open to pressor New onset 2 weeks ago at [facility]  In the setting of profound anemia might be reactive  Patient HR has become controlled after bolus and blood transfusion  -PRN cardizem if bp allows  -May need small bolus with the cardizem  -Low threshold for ICU  -open to pressor  -Dr. Duval consulted

## 2022-07-20 NOTE — H&P ADULT - NSHPPHYSICALEXAM_GEN_ALL_CORE
T(C): 37.4 (07-20-22 @ 19:13), Max: 37.4 (07-20-22 @ 19:13)  T(F): 99.4 (07-20-22 @ 19:13), Max: 99.4 (07-20-22 @ 19:13)  HR: 104 (07-20-22 @ 19:13) (64 - 144)  BP: 179/76 (07-20-22 @ 19:13) (80/50 - 185/67)  RR: 18 (07-20-22 @ 19:13) (16 - 21)  SpO2: 98% (07-20-22 @ 19:13) (98% - 100%)    GENERAL: NAD, lying in bed comfortably  HEAD:  Atraumatic, Normocephalic  EYES: EOMI, PERRLA, conjunctiva and sclera clear  ENT: Moist mucous membranes  NECK: Supple, No JVD  CHEST/LUNG: Clear to auscultation bilaterally; No rales, rhonchi, wheezing, or rubs. Unlabored respirations  HEART: Regular rate and rhythm; No murmurs, rubs, or gallops  ABDOMEN: Bowel sounds present; Soft, Nontender, Nondistended. No hepatomegally  EXTREMITIES:  2+ Peripheral Pulses, brisk capillary refill. No clubbing, cyanosis, or edema  NERVOUS SYSTEM:  Alert & Oriented X3, speech clear. No deficits   MSK: FROM all 4 extremities, full and equal strength  SKIN: No rashes or lesions

## 2022-07-20 NOTE — H&P ADULT - PROBLEM SELECTOR PLAN 1
Patient comes in with dark stools in the setting of Myelodysplastic syndrome  has a postive fobt   has had hypotension  HR 140s  -follow up CT angio to evaulate for bleed  -IV PPI BID  -Type and screen, transfuse as needed  -will transfuse platelents and ffp due to massive transfusion  -cbc q6-8  -Dr. Gerard consulted

## 2022-07-20 NOTE — ED ADULT NURSE NOTE - ED STAT RN HANDOFF DETAILS
cover for lunch break, PRBC 1st unit infusing well, no adverse reaction noted, endorsed to SALENA roman.

## 2022-07-20 NOTE — H&P ADULT - ASSESSMENT
Patient is a 78 yo female who lives at home with bedbound , and has 2 HHA, PMH of MDS initially in remission , now mutated, and complicated by severe anemia and thrombocytopenia requiring blood transfusions, last of which was yesterday, 2 U, for Hgb of 4, dc home stable,  who comes in complaining of Melena, described as dark diarrhea, following hard stools the day prior found to have BP 80/50 EKG showing A-fib and , Hgb of 6.5, K 3, BNP 5K,  occult blood was +Ve s/p CODE fusion was called and 2U of blood was given and a liter bolus, K repalced, HR self convered, lasix and CXR ordered  due to s/o volume overload  and patient was admitted for GI bleed and monitoring for A fib RVR

## 2022-07-21 DIAGNOSIS — U07.1 COVID-19: ICD-10-CM

## 2022-07-21 LAB
ALBUMIN SERPL ELPH-MCNC: 2.7 G/DL — LOW (ref 3.5–5)
ALP SERPL-CCNC: 52 U/L — SIGNIFICANT CHANGE UP (ref 40–120)
ALT FLD-CCNC: 17 U/L DA — SIGNIFICANT CHANGE UP (ref 10–60)
ANION GAP SERPL CALC-SCNC: 12 MMOL/L — SIGNIFICANT CHANGE UP (ref 5–17)
ANION GAP SERPL CALC-SCNC: 14 MMOL/L — SIGNIFICANT CHANGE UP (ref 5–17)
ANION GAP SERPL CALC-SCNC: 9 MMOL/L — SIGNIFICANT CHANGE UP (ref 5–17)
ANISOCYTOSIS BLD QL: SLIGHT — SIGNIFICANT CHANGE UP
AST SERPL-CCNC: 17 U/L — SIGNIFICANT CHANGE UP (ref 10–40)
BASOPHILS # BLD AUTO: 0 K/UL — SIGNIFICANT CHANGE UP (ref 0–0.2)
BASOPHILS NFR BLD AUTO: 0 % — SIGNIFICANT CHANGE UP (ref 0–2)
BILIRUB SERPL-MCNC: 1.4 MG/DL — HIGH (ref 0.2–1.2)
BUN SERPL-MCNC: 20 MG/DL — HIGH (ref 7–18)
BUN SERPL-MCNC: 21 MG/DL — HIGH (ref 7–18)
BUN SERPL-MCNC: 21 MG/DL — HIGH (ref 7–18)
CALCIUM SERPL-MCNC: 7.9 MG/DL — LOW (ref 8.4–10.5)
CALCIUM SERPL-MCNC: 8 MG/DL — LOW (ref 8.4–10.5)
CALCIUM SERPL-MCNC: 8.1 MG/DL — LOW (ref 8.4–10.5)
CHLORIDE SERPL-SCNC: 100 MMOL/L — SIGNIFICANT CHANGE UP (ref 96–108)
CHLORIDE SERPL-SCNC: 100 MMOL/L — SIGNIFICANT CHANGE UP (ref 96–108)
CHLORIDE SERPL-SCNC: 102 MMOL/L — SIGNIFICANT CHANGE UP (ref 96–108)
CO2 SERPL-SCNC: 26 MMOL/L — SIGNIFICANT CHANGE UP (ref 22–31)
CO2 SERPL-SCNC: 28 MMOL/L — SIGNIFICANT CHANGE UP (ref 22–31)
CO2 SERPL-SCNC: 28 MMOL/L — SIGNIFICANT CHANGE UP (ref 22–31)
CREAT SERPL-MCNC: 0.69 MG/DL — SIGNIFICANT CHANGE UP (ref 0.5–1.3)
CREAT SERPL-MCNC: 0.73 MG/DL — SIGNIFICANT CHANGE UP (ref 0.5–1.3)
CREAT SERPL-MCNC: 0.85 MG/DL — SIGNIFICANT CHANGE UP (ref 0.5–1.3)
EGFR: 71 ML/MIN/1.73M2 — SIGNIFICANT CHANGE UP
EGFR: 85 ML/MIN/1.73M2 — SIGNIFICANT CHANGE UP
EGFR: 90 ML/MIN/1.73M2 — SIGNIFICANT CHANGE UP
EOSINOPHIL # BLD AUTO: 0 K/UL — SIGNIFICANT CHANGE UP (ref 0–0.5)
EOSINOPHIL NFR BLD AUTO: 0 % — SIGNIFICANT CHANGE UP (ref 0–6)
GLUCOSE SERPL-MCNC: 108 MG/DL — HIGH (ref 70–99)
GLUCOSE SERPL-MCNC: 142 MG/DL — HIGH (ref 70–99)
GLUCOSE SERPL-MCNC: 154 MG/DL — HIGH (ref 70–99)
HCT VFR BLD CALC: 25.6 % — LOW (ref 34.5–45)
HGB BLD-MCNC: 8.8 G/DL — LOW (ref 11.5–15.5)
HYPOCHROMIA BLD QL: SLIGHT — SIGNIFICANT CHANGE UP
LYMPHOCYTES # BLD AUTO: 27 % — SIGNIFICANT CHANGE UP (ref 13–44)
LYMPHOCYTES # BLD AUTO: 3.28 K/UL — SIGNIFICANT CHANGE UP (ref 1–3.3)
MACROCYTES BLD QL: SLIGHT — SIGNIFICANT CHANGE UP
MAGNESIUM SERPL-MCNC: 1.9 MG/DL — SIGNIFICANT CHANGE UP (ref 1.6–2.6)
MANUAL SMEAR VERIFICATION: SIGNIFICANT CHANGE UP
MCHC RBC-ENTMCNC: 30.7 PG — SIGNIFICANT CHANGE UP (ref 27–34)
MCHC RBC-ENTMCNC: 34.4 GM/DL — SIGNIFICANT CHANGE UP (ref 32–36)
MCV RBC AUTO: 89.2 FL — SIGNIFICANT CHANGE UP (ref 80–100)
MICROCYTES BLD QL: SLIGHT — SIGNIFICANT CHANGE UP
MONOCYTES # BLD AUTO: 0.49 K/UL — SIGNIFICANT CHANGE UP (ref 0–0.9)
MONOCYTES NFR BLD AUTO: 4 % — SIGNIFICANT CHANGE UP (ref 2–14)
NEUTROPHILS # BLD AUTO: 8.39 K/UL — HIGH (ref 1.8–7.4)
NEUTROPHILS NFR BLD AUTO: 69 % — SIGNIFICANT CHANGE UP (ref 43–77)
NRBC # BLD: 1 /100 — HIGH (ref 0–0)
PHOSPHATE SERPL-MCNC: 3.2 MG/DL — SIGNIFICANT CHANGE UP (ref 2.5–4.5)
PLAT MORPH BLD: NORMAL — SIGNIFICANT CHANGE UP
PLATELET # BLD AUTO: 92 K/UL — LOW (ref 150–400)
POTASSIUM SERPL-MCNC: 2.4 MMOL/L — CRITICAL LOW (ref 3.5–5.3)
POTASSIUM SERPL-MCNC: 2.8 MMOL/L — CRITICAL LOW (ref 3.5–5.3)
POTASSIUM SERPL-MCNC: 3 MMOL/L — LOW (ref 3.5–5.3)
POTASSIUM SERPL-SCNC: 2.4 MMOL/L — CRITICAL LOW (ref 3.5–5.3)
POTASSIUM SERPL-SCNC: 2.8 MMOL/L — CRITICAL LOW (ref 3.5–5.3)
POTASSIUM SERPL-SCNC: 3 MMOL/L — LOW (ref 3.5–5.3)
PROT SERPL-MCNC: 5.6 G/DL — LOW (ref 6–8.3)
RBC # BLD: 2.87 M/UL — LOW (ref 3.8–5.2)
RBC # FLD: 15.4 % — HIGH (ref 10.3–14.5)
RBC BLD AUTO: ABNORMAL
SODIUM SERPL-SCNC: 139 MMOL/L — SIGNIFICANT CHANGE UP (ref 135–145)
SODIUM SERPL-SCNC: 140 MMOL/L — SIGNIFICANT CHANGE UP (ref 135–145)
SODIUM SERPL-SCNC: 140 MMOL/L — SIGNIFICANT CHANGE UP (ref 135–145)
TSH SERPL-MCNC: 1.36 UU/ML — SIGNIFICANT CHANGE UP (ref 0.34–4.82)
WBC # BLD: 12.16 K/UL — HIGH (ref 3.8–10.5)
WBC # FLD AUTO: 12.16 K/UL — HIGH (ref 3.8–10.5)

## 2022-07-21 PROCEDURE — 99222 1ST HOSP IP/OBS MODERATE 55: CPT

## 2022-07-21 PROCEDURE — 93306 TTE W/DOPPLER COMPLETE: CPT | Mod: 26

## 2022-07-21 RX ORDER — ONDANSETRON 8 MG/1
4 TABLET, FILM COATED ORAL ONCE
Refills: 0 | Status: DISCONTINUED | OUTPATIENT
Start: 2022-07-21 | End: 2022-07-21

## 2022-07-21 RX ORDER — POTASSIUM CHLORIDE 20 MEQ
10 PACKET (EA) ORAL
Refills: 0 | Status: COMPLETED | OUTPATIENT
Start: 2022-07-21 | End: 2022-07-21

## 2022-07-21 RX ORDER — POTASSIUM CHLORIDE 20 MEQ
40 PACKET (EA) ORAL ONCE
Refills: 0 | Status: COMPLETED | OUTPATIENT
Start: 2022-07-21 | End: 2022-07-21

## 2022-07-21 RX ORDER — ONDANSETRON 8 MG/1
4 TABLET, FILM COATED ORAL ONCE
Refills: 0 | Status: COMPLETED | OUTPATIENT
Start: 2022-07-21 | End: 2022-07-21

## 2022-07-21 RX ORDER — METOPROLOL TARTRATE 50 MG
25 TABLET ORAL
Refills: 0 | Status: DISCONTINUED | OUTPATIENT
Start: 2022-07-21 | End: 2022-07-25

## 2022-07-21 RX ADMIN — Medication 100 MILLIEQUIVALENT(S): at 21:59

## 2022-07-21 RX ADMIN — Medication 40 MILLIEQUIVALENT(S): at 20:34

## 2022-07-21 RX ADMIN — PANTOPRAZOLE SODIUM 40 MILLIGRAM(S): 20 TABLET, DELAYED RELEASE ORAL at 18:35

## 2022-07-21 RX ADMIN — Medication 40 MILLIEQUIVALENT(S): at 18:34

## 2022-07-21 RX ADMIN — Medication 100 MILLIEQUIVALENT(S): at 09:31

## 2022-07-21 RX ADMIN — Medication 100 MILLIEQUIVALENT(S): at 12:03

## 2022-07-21 RX ADMIN — Medication 100 MILLIEQUIVALENT(S): at 07:07

## 2022-07-21 RX ADMIN — PANTOPRAZOLE SODIUM 40 MILLIGRAM(S): 20 TABLET, DELAYED RELEASE ORAL at 06:02

## 2022-07-21 RX ADMIN — ONDANSETRON 4 MILLIGRAM(S): 8 TABLET, FILM COATED ORAL at 12:25

## 2022-07-21 RX ADMIN — Medication 100 MILLIEQUIVALENT(S): at 23:04

## 2022-07-21 RX ADMIN — Medication 25 MILLIGRAM(S): at 12:25

## 2022-07-21 RX ADMIN — Medication 100 MILLIEQUIVALENT(S): at 20:33

## 2022-07-21 RX ADMIN — Medication 40 MILLIEQUIVALENT(S): at 12:08

## 2022-07-21 NOTE — PROGRESS NOTE ADULT - PROBLEM SELECTOR PLAN 1
Patient comes in with dark stools in the setting of Myelodysplastic syndrome  has a positive fobt   has had hypotension  HR 140s initially, now heart rate between 107-97  -CT angio 7/19/22 = no signs of acute GI bleed  -IV PPI BID  -Type and screen, transfuse as needed  -will transfuse platelets and ffp due to massive transfusion  -cbc q6-8, monitor H/H and platelets   -Had a colonoscopy/endo April, 2022 at [facility] (last admission to the hospital)   -Dr. Gerard onboard, will follow on their plan for a scope

## 2022-07-21 NOTE — CONSULT NOTE ADULT - NS ATTEND AMEND GEN_ALL_CORE FT
- Anemia.  - FOBT+.  - Thrombocytopenia.  - Dark stools.    Patient seen and examined. GI consulted for anemia. Patient reports recent extensive GI w/u for anemia, FOBT+ including EGD/colonoscopy and capsule endoscopy few months ago which per pt report were unremarkable. Presenting again with anemia, dark stools. No overt GI bleeding here and Hb with appropriate response to PRBCs suggesting against active GI bleed. Of note also, initially with significant thrombocytopenia plts 12, now improved after transfusion which likely contributed. PPI BID. Obtain outside endoscopic reports. Repeat endoscopic eval with reportedly unremarkable extensive prior recent w/u likely low yield. Monitor CBC.

## 2022-07-21 NOTE — PATIENT PROFILE ADULT - FALL HARM RISK - HARM RISK INTERVENTIONS

## 2022-07-21 NOTE — CONSULT NOTE ADULT - SUBJECTIVE AND OBJECTIVE BOX
C A R D I O L O G Y  *********************    DATE OF SERVICE: 07-21-22    HISTORY OF PRESENT ILLNESS: HPI:  Patient is a 78 yo female who lives at home with bedbound , and has 2 HHA, PMH of MDS initially in remission , now mutated, and complicated by severe anemia and thrombocytopenia requiring blood transfusions, last of which was yesterday, 2 U, for Hgb of 4, dc home stable,  who comes in complaining of Melena, described as dark diarrhea, following hard stools the day prior.    OF note patient also had transfusion 2 weeks ago, at [facility] where she was found with hgb of 3. Patient was also found with one episode of A-fib RVR which resolved, as well as asymptomatic bacteriuria which was treated.  She does not appear to be on anticoagulation     Now presents with dark stools, lightheadedness, low appetite, not eaten since 4 dyas ago, nausea with dry heaves, no vomiting, denies fever, chills, changes in urine output.    In the ED patient was AF, and sats 98% on RA, however  BP 80/50 EKG showing A-fib and   Exam: notable for cachectic pleasant female, with no distress, pale skin, but benign exam overall  Labs: showed Hgb of 6.5, K 3, BNP 5K, COVID +Ve (not new) occult blood was +Ve    CODE fusion was called and 2U of blood was given and a liter bolus  For the A fib RVR diltiazem was given at 5mg   K was replaced  lasix and CXR ordered  due to s/o volume overload  and patient was admitted for GI bleed       (20 Jul 2022 17:32)      PAST MEDICAL & SURGICAL HISTORY:  Hypertension      Anemia      Myelodysplastic syndrome      Pancytopenia      No significant past surgical history              MEDICATIONS:  MEDICATIONS  (STANDING):  ondansetron    Tablet 4 milliGRAM(s) Oral once  pantoprazole  Injectable 40 milliGRAM(s) IV Push every 12 hours  potassium chloride   Powder 40 milliEquivalent(s) Oral once  potassium chloride  10 mEq/100 mL IVPB 10 milliEquivalent(s) IV Intermittent every 1 hour      Allergies    penicillin (Hives)    Intolerances        FAMILY HISTORY:    Non-contributary for premature coronary disease or sudden cardiac death    SOCIAL HISTORY:    [ X] Non-smoker  [ ] Smoker  [ ] Alcohol    FLU VACCINE THIS YEAR STARTS IN AUGUST:  [ ] Yes    [ ] No    IF OVER 65 HAVE YOU EVER HAD A PNA VACCINE:  [ ] Yes    [ ] No       [ ] N/A      REVIEW OF SYSTEMS:  [ ]chest pain  [  ]shortness of breath  [  ]palpitations  [  ]syncope  [ ]near syncope [ ]upper extremity weakness   [ ] lower extremity weakness  [  ]diplopia  [  ]altered mental status   [  ]fevers  [ ]chills [ ]nausea  [ ]vomitting  [  ]dysphagia    [ ]abdominal pain  [ ]melena  [ ]BRBPR    [  ]epistaxis  [  ]rash    [ ]lower extremity edema        [X] All others negative	  [ ] Unable to obtain      LABS:	 	    CARDIAC MARKERS:        Troponin I, High Sensitivity Result: 48.0 ng/L (07-20-22 @ 13:55)                            8.8    12.16 )-----------( 92       ( 21 Jul 2022 05:00 )             25.6     Hb Trend: 8.8<--, 6.5<--    07-21    x   |  x   |  x   ----------------------------<  x   x    |  x   |  0.73    Ca    7.9<L>      21 Jul 2022 05:00  Phos  3.2     07-21  Mg     1.9     07-21    TPro  5.6<L>  /  Alb  2.7<L>  /  TBili  1.4<H>  /  DBili  x   /  AST  17  /  ALT  17  /  AlkPhos  52  07-21    Creatinine Trend: 0.73<--, 0.85<--, 0.96<--, 1.03<--    Coags:      proBNP: Serum Pro-Brain Natriuretic Peptide: 5170 pg/mL (07-20 @ 13:55)    Lipid Profile:   HgA1c:   TSH: Thyroid Stimulating Hormone, Serum: 1.36 uU/mL (07-21 @ 05:00)  Thyroid Stimulating Hormone, Serum: 1.48 uU/mL (07-20 @ 13:55)          PHYSICAL EXAM:  T(C): 37.7 (07-21-22 @ 11:05), Max: 37.7 (07-21-22 @ 11:05)  HR: 97 (07-21-22 @ 11:05) (64 - 144)  BP: 138/76 (07-21-22 @ 11:05) (80/50 - 185/67)  RR: 18 (07-21-22 @ 11:05) (16 - 20)  SpO2: 98% (07-21-22 @ 11:05) (97% - 100%)  Wt(kg): --   BMI (kg/m2): 21.6 (07-20-22 @ 12:48)  I&O's Summary      HEENT:  (-)icterus (-)pallor  CV: N S1 S2 1/6 KECIA (+)2 Pulses B/l  Resp:  Clear to ausculatation B/L, normal effort  GI: (+) BS Soft, NT, ND  Lymph:  (-)Edema, (-)obvious lymphadenopathy  Skin: Warm to touch, Normal turgor  Psych: Appropriate mood and affect        ECG:  	afib 119 BPM, nonspecific Y wave abnormality     RADIOLOGY:         CXR:   < from: Xray Chest 1 View-PORTABLE IMMEDIATE (Xray Chest 1 View-PORTABLE IMMEDIATE .) (07.20.22 @ 13:57) >  No radiographic evidence of active chest disease.    < end of copied text >      ASSESSMENT/PLAN: 	77y Female  female who lives at home with bedbound , and has 2 HHA, PMH of MDS initially in remission , now mutated, and complicated by severe anemia and thrombocytopenia requiring blood transfusions, last of which was yesterday, 2 U, for Hgb of 4, dc home stable,  who comes in complaining of Melena, noted with rapid afib.    - Appears her MDS precluded use of anticoagulation in the past, now with a suspected GI bleed as well she is definitely not a candidate for anticoagulation at present  - Echo  - Start Lopressor 25 mg PO BID  - Heme and GI f/u  - Monitor H/H and platelets     I once again thank you for allowing me to participate in the care of your patient.  If you have any questions or concerns please do not hesitate to contact me.    Siddhartha Duval MD, [facility]  BEEPER [phone]      
[facility]    Patient is a 77y old Female who presents with a chief complaint of Melena (21 Jul 2022 11:43)    HPI:  Patient is a 78 yo female who lives at home with bedbound , and has 2 HHA, PMH of MDS initially in remission , now mutated, and complicated by severe anemia and thrombocytopenia requiring blood transfusions, last of which was yesterday, 2 U, for Hgb of 4, dc home stable,  who comes in complaining of Melena, described as dark diarrhea, following hard stools the day prior. OF note patient also had transfusion 2 weeks ago, at [facility] where she was found with hgb of 3. Patient was also found with one episode of A-fib RVR which resolved, as well as asymptomatic bacteriuria which was treated. Now presents with dark stools, lightheadedness, low appetite, not eaten since 4 dyas ago, nausea with dry heaves, no vomiting, denies fever, chills, changes in urine output.    In the ED patient was AF, and sats 98% on RA, however  BP 80/50 EKG showing A-fib and   Exam: notable for cachectic pleasant female, with no distress, pale skin, but benign exam overall  Labs: showed Hgb of 6.5, K 3, BNP 5K, COVID +Ve (not new) occult blood was +Ve    CODE fusion was called and 2U of blood was given and a liter bolus  For the A fib RVR diltiazem was given at 5mg   K was replaced  lasix and CXR ordered  due to s/o volume overload  and patient was admitted for GI bleed    GI HPI: Patient assessed at bedside. Patient stated that since Tuesday of this week, she has had 3-4 black stools per day, associated with weakness and dry heaving. The patient states that she has had a decrease in appetite for some time, but now reports having an appetite today AM. She also reports weight loss, but the change and duration is unknown. She denies taking anticoagulation medications, dysphagia, fever and vomiting. Patient denies previous abdominal surgeries. She reports smoking 3-4 cigarettes per day for several years and stopped 4 years ago and recreationally drinks on special occasions. Patient reports having an endoscopy and colonoscopy in April, with normal results.     PMH/PSH:  PAST MEDICAL & SURGICAL HISTORY:  Hypertension      Anemia      Myelodysplastic syndrome      Pancytopenia      No significant past surgical history        FH:  FAMILY HISTORY:      MEDS:  MEDICATIONS  (STANDING):  metoprolol tartrate 25 milliGRAM(s) Oral two times a day  pantoprazole  Injectable 40 milliGRAM(s) IV Push every 12 hours    MEDICATIONS  (PRN):  diltiazem Injectable 5 milliGRAM(s) IV Push Once PRN >125    Allergies    penicillin (Hives)    Intolerances      CONSTITUTIONAL: (+) unintentional weight loss, unknown amount, over several months. (+) generalized weakness. No chills or fatigue.  HEENT:  Eyes:  No visual loss, blurred vision, double vision or yellow sclerae. Ears, Nose, Throat:  No hearing loss, sneezing, congestion, runny nose or sore throat.  SKIN:  No rash or itching.  CARDIOVASCULAR:  No chest pain, chest pressure or chest discomfort. No palpitations or edema.  RESPIRATORY:  No shortness of breath, cough or sputum.  GASTROINTESTINAL:  SEE HPI  GENITOURINARY:  No dysuria, hematuria, urinary frequency  NEUROLOGICAL:  No headache, dizziness, syncope, paralysis, ataxia, numbness or tingling in the extremities. No change in bowel or bladder control.  MUSCULOSKELETAL:  No muscle, back pain, joint pain or stiffness.    ______________________________________________________________________  PHYSICAL EXAM:  T(C): 37.7 (07-21-22 @ 11:05), Max: 37.7 (07-21-22 @ 11:05)  HR: 97 (07-21-22 @ 11:05)  BP: 138/76 (07-21-22 @ 11:05)  RR: 18 (07-21-22 @ 11:05)  SpO2: 98% (07-21-22 @ 11:05)  Wt(kg): --      GEN: NAD, normocephalic  CVS: S1S2+  CHEST: clear to auscultation  ABD: soft , nontender, nondistended, bowel sounds present  EXTR: no cyanosis, no clubbing, no edema, (+) bilateral ecchymosis on hands  NEURO: Awake and alert; oriented x4  SKIN:  warm; non icteric    ______________________________________________________________________  LABS:                        8.8    12.16 )-----------( 92       ( 21 Jul 2022 05:00 )             25.6     07-21    140  |  100  |  21<H>  ----------------------------<  142<H>  2.8<LL>   |  28  |  0.73    Ca    8.0<L>      21 Jul 2022 10:46  Phos  3.2     07-21  Mg     1.9     07-21    TPro  5.6<L>  /  Alb  2.7<L>  /  TBili  1.4<H>  /  DBili  x   /  AST  17  /  ALT  17  /  AlkPhos  52  07-21    LIVER FUNCTIONS - ( 21 Jul 2022 05:00 )  Alb: 2.7 g/dL / Pro: 5.6 g/dL / ALK PHOS: 52 U/L / ALT: 17 U/L DA / AST: 17 U/L / GGT: x           PT/INR - ( 20 Jul 2022 13:55 )   PT: 12.9 sec;   INR: 1.08 ratio         PTT - ( 20 Jul 2022 13:55 )  PTT:23.5 sec  ____________________________________________    IMAGING:    < from: CT Angio Abdomen and Pelvis w/ IV Cont (07.20.22 @ 21:16) >    CT ANGIO ABD PELV (W)AW IC                          PROCEDURE DATE:  07/20/2022          INTERPRETATION:  CLINICAL INFORMATION:    COMPARISON: None.    CONTRAST/COMPLICATIONS:  IV Contrast: Omnipaque 350  90 cc administered   10 cc discarded  Oral Contrast: NONE  Complications: None reported at time of study completion    PROCEDURE:  CT of the Abdomen and Pelvis was performed.  Precontrast, Arterial and Delayed phases were performed.  Sagittal and coronal reformats were performed.    FINDINGS:  LOWER CHEST: Mitral annular calcification.    LIVER: Within normal limits.  BILE DUCTS: Normal caliber.  GALLBLADDER: Within normal limits.  SPLEEN: Within normal limits.  PANCREAS: 4 mm pancreatic body cyst.  ADRENALS: Within normal limits.  KIDNEYS/URETERS: Atrophic and poorly enhancing left kidney, progressed   from prior. 1.5 cm left midpole cyst. No left hydroureteronephrosis.    Right upper and midpole parapelvic cysts. Cortical cysts and cortical   hypodensities too small to characterize. No hydroureteronephrosis.    BLADDER: Moderately distended measuring 14.6 cm sagittal.  REPRODUCTIVE ORGANS: Unremarkable uterus.    BOWEL: No bowel obstruction. Appendix is not visualized. No evidence of   inflammation in the pericecal region.. Moderate sliding hiatus hernia. No   intraluminal contrast extravasation. Mild colonic fecal burden.  PERITONEUM: No ascites.  VESSELS: Atherosclerotic changes. Patent celiac artery, SMA and MARIANNA. The   left renal artery is not visualized. The right is patent.  RETROPERITONEUM/LYMPH NODES: No lymphadenopathy.  ABDOMINAL WALL: Within normal limits.  BONES: Within normal limits.    IMPRESSION:  No CT evidence of active GI bleed.    Constipated colon.    Atrophic minimally enhancing left kidney, progressed from prior, possibly   related to left renal artery stenosis.        A preliminary report was provided by Dr. Maryan Pearce. I agree with   the interpretation.    --- End of Report ---      < end of copied text >      
Patient is a 77y old  Female who presents with a chief complaint of Melena (21 Jul 2022 16:25)      HPI:  Patient is a 78 yo female who lives at home with bedbound , and has 2 HHA, PMH of MDS initially in remission , now mutated, and complicated by severe anemia and thrombocytopenia requiring blood transfusions, last of which was yesterday, 2 U, for Hgb of 4, dc home stable,  who comes in complaining of Melena, described as dark diarrhea, following hard stools the day prior.    OF note patient also had transfusion 2 weeks ago, at [facility] where she was found with hgb of 3. Patient was also found with one episode of A-fib RVR which resolved, as well as asymptomatic bacteriuria which was treated.    Now presents with dark stools, lightheadedness, low appetite, not eaten since 4 dyas ago, nausea with dry heaves, no vomiting, denies fever, chills, changes in urine output.    In the ED patient was AF, and sats 98% on RA, however  BP 80/50 EKG showing A-fib and   Exam: notable for cachectic pleasant female, with no distress, pale skin, but benign exam overall  Labs: showed Hgb of 6.5, K 3, BNP 5K, COVID +Ve (not new) occult blood was +Ve    CODE fusion was called and 2U of blood was given and a liter bolus  For the A fib RVR diltiazem was given at 5mg   K was replaced  lasix and CXR ordered  due to s/o volume overload  and patient was admitted for GI bleed       (20 Jul 2022 17:32)       ROS:  Negative except for:    PAST MEDICAL & SURGICAL HISTORY:  Hypertension      Anemia      Myelodysplastic syndrome      Pancytopenia      No significant past surgical history          SOCIAL HISTORY:    FAMILY HISTORY:      MEDICATIONS  (STANDING):  metoprolol tartrate 25 milliGRAM(s) Oral two times a day  pantoprazole  Injectable 40 milliGRAM(s) IV Push every 12 hours  potassium chloride   Powder 40 milliEquivalent(s) Oral once  potassium chloride  10 mEq/100 mL IVPB 10 milliEquivalent(s) IV Intermittent every 1 hour    MEDICATIONS  (PRN):      Allergies    penicillin (Hives)    Intolerances        Vital Signs Last 24 Hrs  T(C): 37.2 (21 Jul 2022 17:07), Max: 37.7 (21 Jul 2022 11:05)  T(F): 98.9 (21 Jul 2022 17:07), Max: 99.9 (21 Jul 2022 11:05)  HR: 92 (21 Jul 2022 17:07) (92 - 113)  BP: 102/83 (21 Jul 2022 17:07) (102/83 - 182/75)  BP(mean): 88 (21 Jul 2022 17:07) (88 - 88)  RR: 18 (21 Jul 2022 17:07) (18 - 20)  SpO2: 99% (21 Jul 2022 17:07) (97% - 100%)    Parameters below as of 21 Jul 2022 17:07  Patient On (Oxygen Delivery Method): room air        PHYSICAL EXAM  General: adult in NAD  HEENT: clear oropharynx, anicteric sclera, pink conjunctiva  Neck: supple  CV: normal S1/S2 with no murmur rubs or gallops  Lungs: positive air movement b/l ant lungs,clear to auscultation, no wheezes, no rales  Abdomen: soft non-tender non-distended, no hepatosplenomegaly  Ext: no clubbing cyanosis or edema  Skin: no rashes and no petechiae  Neuro: alert and oriented X 4, no focal deficits      LABS:                          8.8    12.16 )-----------( 92       ( 21 Jul 2022 05:00 )             25.6         Mean Cell Volume : 89.2 fl  Mean Cell Hemoglobin : 30.7 pg  Mean Cell Hemoglobin Concentration : 34.4 gm/dL  Auto Neutrophil # : 8.39 K/uL  Auto Lymphocyte # : 3.28 K/uL  Auto Monocyte # : 0.49 K/uL  Auto Eosinophil # : 0.00 K/uL  Auto Basophil # : 0.00 K/uL  Auto Neutrophil % : 69.0 %  Auto Lymphocyte % : 27.0 %  Auto Monocyte % : 4.0 %  Auto Eosinophil % : 0.0 %  Auto Basophil % : 0.0 %      Serial CBC's  07-21 @ 05:00  Hct-25.6 / Hgb-8.8 / Plat-92 / RBC-2.87 / WBC-12.16  Serial CBC's  07-20 @ 13:55  Hct-20.2 / Hgb-6.5 / Plat-12 / RBC-2.23 / WBC-8.65  Serial CBC's  07-19 @ 17:50  Hct-14.7 / Hgb-4.3 / Plat-15 / RBC-1.45 / WBC-6.57      07-21    139  |  102  |  20<H>  ----------------------------<  108<H>  3.0<L>   |  28  |  0.69    Ca    8.1<L>      21 Jul 2022 17:45  Phos  3.2     07-21  Mg     1.9     07-21    TPro  5.6<L>  /  Alb  2.7<L>  /  TBili  1.4<H>  /  DBili  x   /  AST  17  /  ALT  17  /  AlkPhos  52  07-21      PT/INR - ( 20 Jul 2022 13:55 )   PT: 12.9 sec;   INR: 1.08 ratio         PTT - ( 20 Jul 2022 13:55 )  PTT:23.5 sec                BLOOD SMEAR INTERPRETATION:       RADIOLOGY & ADDITIONAL STUDIES:

## 2022-07-21 NOTE — GOALS OF CARE CONVERSATION - ADVANCED CARE PLANNING - CONVERSATION DETAILS
Prognosis of A fib and stroke risk was discussed. Prognosis of MDS progression was addressed and deferred to hemonc eval in [facility] and with Dr. Mosley for further clarification. The MDS has gotten worse in the last 2 months, therefore the only idea is we have of the prognosis is the hgb trends.    We agreed to treat any reversible cause of mortality. GI bleed, A flutter.     We discussed the risks and benefits of CPR in great detail, emphasizing the potential of prolonging suffering if it were successful, however more time was spent on explaining how CPR is performed. Patient decided on DNR DNI    We spoke about long term plans, about palliative care and hospice, asked the patient if she would be interested in end of life care if her progression got worse, she expressed that she would be interested. Was advised to continue MDS management and to inquire about palliative care whenever she has a clearer time line.

## 2022-07-21 NOTE — PATIENT PROFILE ADULT - NSPROHMSYMPCOND_GEN_A_NUR
Janae Nyhan  [id number]   Chief Complaint   Patient presents with    Shoulder Pain     Left        HISTORY OF PRESENT ILLNESS  Janae Nyhan is a 77 y.o. female who presents today for evaluation of left shoulder pain. Patient fell in kitchen on 6/2/19. Today she states her pain is a 5/10. Pain is an aching, dull type pain.night pain is her biggest complaint. Patient denies any fever, chills, chest pain, shortness of breath or calf pain. The remainder of the review of systems is negative. There are no new illness or injuries to report since last seen in the office. No changes in medications, allergies, social or family history. PHYSICAL EXAM:   Visit Vitals  /67 (BP 1 Location: Left arm, BP Patient Position: Sitting)   Pulse 77   Temp 95.2 °F (35.1 °C) (Oral)   Resp 17   Ht 5' 6.25\" (1.683 m)   Wt 242 lb 9.6 oz (110 kg)   SpO2 98% Comment: RA   BMI 38.86 kg/m²     The patient is a well-developed, well-nourished female   in no acute distress. The patient is alert and oriented times three. The patient is alert and oriented times three. Mood and affect are normal.  LYMPHATIC: lymph nodes are not enlarged and are within normal limits  SKIN: normal in color and non tender to palpation. There are no bruises or abrasions noted. NEUROLOGICAL: Motor sensory exam is within normal limits. Reflexes are equal bilaterally.  There is normal sensation to pinprick and light touch  MUSCULOSKELETAL:  Examination Left shoulder   Skin Intact   AC joint tenderness -   Biceps tenderness -   Forward flexion/Elevation ROM 70   Active abduction ROM 45   Glenohumeral abduction 55   External rotation ROM 15   Internal rotation ROM 15   Apprehension -   Tomass Relocation -   Jerk -   Load and Shift -   Obriens -   Speeds -   Impingement sign -   Supraspinatus/Empty Can 4/5   External Rotation Strength -, 5/5   Lift Off/Belly Press -, 5/5   Neurovascular Intact     No ttp   able to reach to side of head    IMAGING: 3 view xray images of left shoulder  on 9/3/2019 read and reviewed by myself reveal minimal proximal displacement of greater tuberosity displacement. Callus formation seen     IMPRESSION:      ICD-10-CM ICD-9-CM    1. Closed displaced fracture of greater tuberosity of left humerus with routine healing, subsequent encounter S42.252D V54.11 AMB POC XRAY, SHOULDER; COMPLETE, 2+        PLAN:   1. Patient with minimally proximal displaced greater tuberosity fracture s/p fall. Will cont with PT    Risk factors include: age, BMI>35  2. No cortisone injection indicated today   3. YES Physical/Occupational Therapy indicated today: AROM left shoulder. 4. No diagnostic test indicated today:   5. NO durable medical equipment indicated today   6. No referral indicated today   7. No medications indicated today:   8.  No Narcotic indicated today     RTC 4 weeks     Patient seen and evaluated by Dr. Milagro Bishop today who agrees with treatment plan     GEOVANY [phone]ist none

## 2022-07-21 NOTE — PROGRESS NOTE ADULT - SUBJECTIVE AND OBJECTIVE BOX
PGY-1 Progress Note discussed with attending    CHIEF COMPLAINT & BRIEF HOSPITAL COURSE:    Patient is a 78 yo female who lives at home with bedbound , and has 2 HHA, PMH of MDS initially in remission , now mutated, and complicated by severe anemia and thrombocytopenia requiring blood transfusions, last of which was yesterday, 2 U, for Hgb of 4, dc home stable,  who comes in complaining of Melena, described as dark diarrhea, following hard stools the day prior found to have BP 80/50 EKG showing A-fib and , Hgb of 6.5, K 3, BNP 5K,  occult blood was +Ve s/p CODE fusion was called and 2U of blood was given and a liter bolus, K replaced HR self converted lasix and CXR ordered  due to s/o volume overload and patient was admitted for GI bleed and monitoring for A fib RVR    INTERVAL HPI/OVERNIGHT EVENTS:     Pt.'s potassium dropped to 2.4, and she was repleted IV/oral early this morning. Otherwise, no acute events overnight. Pt. says that she feels weak, had two episodes of diarrhea this morning, passing foul gas, nausea, mild abdominal pain. Pt. has no chest pain, SOB, palpitations, vomiting.     MEDICATIONS  (STANDING):  metoprolol tartrate 25 milliGRAM(s) Oral two times a day  pantoprazole  Injectable 40 milliGRAM(s) IV Push every 12 hours  potassium chloride    Tablet ER 40 milliEquivalent(s) Oral once    MEDICATIONS  (PRN)      REVIEW OF SYSTEMS:  CONSTITUTIONAL: No fever, weight loss, or fatigue  RESPIRATORY: No cough, wheezing, chills or hemoptysis; No shortness of breath  CARDIOVASCULAR: No chest pain, palpitations, dizziness, or leg swelling  GASTROINTESTINAL: No abdominal pain. No nausea, vomiting, or hematemesis; No diarrhea or constipation. No melena or hematochezia.  GENITOURINARY: No dysuria or hematuria, urinary frequency  NEUROLOGICAL: No headaches, memory loss, loss of strength, numbness, or tremors  SKIN: No itching, burning, rashes, or lesions     Vital Signs Last 24 Hrs  T(C): 37.7 (21 Jul 2022 11:05), Max: 37.7 (21 Jul 2022 11:05)  T(F): 99.9 (21 Jul 2022 11:05), Max: 99.9 (21 Jul 2022 11:05)  HR: 97 (21 Jul 2022 11:05) (97 - 113)  BP: 138/76 (21 Jul 2022 11:05) (138/76 - 185/67)  BP(mean): --  RR: 18 (21 Jul 2022 11:05) (18 - 20)  SpO2: 98% (21 Jul 2022 11:05) (97% - 100%)    Parameters below as of 21 Jul 2022 11:05  Patient On (Oxygen Delivery Method): nasal cannula        PHYSICAL EXAMINATION:  GENERAL: NAD, well built  HEAD:  Atraumatic, Normocephalic  EYES:  conjunctiva and sclera clear  NECK: Supple, No JVD, Normal thyroid  CHEST/LUNG: Clear to auscultation. Clear to percussion bilaterally; No rales, rhonchi, wheezing, or rubs  HEART: Regular rate and rhythm; No murmurs, rubs, or gallops  ABDOMEN: Soft, Nontender, Nondistended; Bowel sounds present  NERVOUS SYSTEM:  Alert & Oriented X3,    EXTREMITIES:  2+ Peripheral Pulses, No clubbing, cyanosis, or edema  SKIN: warm dry                          8.8    12.16 )-----------( 92       ( 21 Jul 2022 05:00 )             25.6     07-21    140  |  100  |  21<H>  ----------------------------<  142<H>  2.8<LL>   |  28  |  0.73    Ca    8.0<L>      21 Jul 2022 10:46  Phos  3.2     07-21  Mg     1.9     07-21    TPro  5.6<L>  /  Alb  2.7<L>  /  TBili  1.4<H>  /  DBili  x   /  AST  17  /  ALT  17  /  AlkPhos  52  07-21    LIVER FUNCTIONS - ( 21 Jul 2022 05:00 )  Alb: 2.7 g/dL / Pro: 5.6 g/dL / ALK PHOS: 52 U/L / ALT: 17 U/L DA / AST: 17 U/L / GGT: x               PT/INR - ( 20 Jul 2022 13:55 )   PT: 12.9 sec;   INR: 1.08 ratio         PTT - ( 20 Jul 2022 13:55 )  PTT:23.5 sec    CAPILLARY BLOOD GLUCOSE      RADIOLOGY & ADDITIONAL TESTS:

## 2022-07-21 NOTE — PROGRESS NOTE ADULT - PROBLEM SELECTOR PLAN 2
New onset 2 weeks ago at [facility]  In the setting of profound anemia might be reactive  Patient HR has become controlled after bolus and blood transfusion  -PRN cardizem if bp allows  -Dr. Duval = Start Lopressor 25 mg PO BID  -f/u TTE results

## 2022-07-21 NOTE — CONSULT NOTE ADULT - ASSESSMENT
Patient is a 78 yo female who lives at home with bedbound , and has 2 HHA, PMH of MDS initially in remission , now mutated, and complicated by severe anemia and thrombocytopenia requiring blood transfusions, last of which was yesterday, 2 U, for Hgb of 4, dc home stable,  who comes in complaining of Melena, described as dark diarrhea, following hard stools the day prior.Of note patient also had transfusion 2 weeks ago, at [facility] where she was found with hgb of 3. Patient was also found with one episode of A-fib RVR which resolved, as well as asymptomatic bacteriuria which was treated. Now presents with dark stools, lightheadedness, low appetite, not eaten since 4 dyas ago, nausea with dry heaves, no vomiting, denies fever, chills, changes in urine output.    In the ED patient was AF, and sats 98% on RA, however  BP 80/50 EKG showing A-fib and   Exam: notable for cachectic pleasant female, with no distress, pale skin, but benign exam overall  Labs: showed Hgb of 6.5, K 3, BNP 5K, COVID +Ve (not new) occult blood was +Ve  CODE fusion was called and 2U of blood was given and a liter bolus  For the A fib RVR diltiazem was given at 5mg   K was replaced  lasix and CXR ordered  due to s/o volume overload    Patient was admitted for GI Bleed. GI was consulted for further evaluation.        (20 Jul 2022 17:32)  
77 year old lady with MDS, severe anemia and thrombocytopenia had treansfusion 3 days ago and presented with tarry stool and HB 4.  No abd pain but she has afib with RVR.    1. MDS  off treatment.  has severe anemia and thrombocytopenia  she had PRBC and platelet     2 GIB  it happened recently  she had EGD and colonoscopy without significant finding  hopefully the bleeding can stop once platelet is up    3 Afib  it happened once recently also  not candidate for AC

## 2022-07-21 NOTE — PROGRESS NOTE ADULT - ATTENDING COMMENTS
Patient is a 78 yo female who lives at home with bedbound , and has 2 HHA, PMH of MDS initially in remission , now mutated, and complicated by severe anemia and thrombocytopenia requiring blood transfusions, last of which was yesterday, 2 U, for Hgb of 4, dc home stable,  who comes in complaining of Melena, described as dark diarrhea, following hard stools the day prior.    OF note patient also had transfusion 2 weeks ago, at [facility] where she was found with hgb of 3. Patient was also found with one episode of A-fib RVR which resolved, as well as asymptomatic bacteriuria which was treated.    Now presents with dark stools, lightheadedness, low appetite, not eaten since 4 days ago, nausea with dry heaves, no vomiting, denies fever, chills, changes in urine output.    In the ED patient was AF, and sats 98% on RA, however  BP 80/50 EKG showing A-fib and   Exam: notable for cachectic pleasant female, with no distress, pale skin, but benign exam overall  Labs: showed Hgb of 6.5, K 3, BNP 5K, COVID +Ve (not new) occult blood was +Ve    CODE fusion was called and 2U of blood was given and a liter bolus  For the A fib RVR diltiazem was given at 5mg   K was replaced  lasix and CXR ordered  due to s/o volume overload  and patient was admitted for GI bleed    # CASE D/W DAUGHTER AT BEDSIDE; CONVEYED THAT PROGNOSIS IS GUARDED, PATIENT AND FAMILY VERBALIZED UNDERSTANDING    # R/O GI BLEED  # NORMOCYTIC ANEMIA, MDS  # THROMBOCYTOPENIA   # CHRONIC CONSTIPATION, HX OF HEMORRHOID  - GIVEN 2 UNITS PRBC, 1 UNIT PLATELETS AND 1 UNIT FFP; PATIENT WAS ALSO TRANSFUSED 7/19/22   - F/U POST TRANSFUSION CBC, MONITOR SYMPTOMS CLOSELY DURING TRANSFUSION  - TREND HGB, PPI IV BID  - NOTED CT A/P  - TRANSFUSION THRESHOLD HGB < 7  - TRANSFUSION THRESHOLD FOR PLT < 10 [IF NO BLEEDING], < 50 [IF BLEEDING]  - GASTROENTEROLOGY CONSULT  - HEME/ONC CONSULT    - EGD/COLONOSCOPY IN 2022 - WILL OBTAIN RECORDS    - LOW THRESHOLD FOR CRITICAL CARE CONSULT, D/W RESIDENT TEAM    # AFIB W/ RVR - IMPROVED  - PRN RATE CONTROL  - MONITOR ON TELEMETRY  - F/U ECHOCARDIOGRAM  - HOLD A/C GIVEN CONCERN FOR BLEEDING; DISCUSSED WITH PATIENT AND WITH DAUGHTER REGARDING BLEEDING RISK VS. STROKE RISK. BOTH VERBALIZED UNDERSTANDING   - STARTED ON METOPROLOL PER CARDIOLOGY  - CARDIOLOGY CONSULT    # COVID19 INFECTION  - PATIENT IS S/P ? MEDICATION [ SUSPECT PAXLOVID PER DAUGHTER ]  - MONITOR PO2, MONITOR INFLAMMATORY MARKERS  - MAY DEFER CONTACT AND DROPLET ISOLATION PRECAUTIONS  -- PER FAMILY PATIENT TESTED POSITIVE ON APPROXIMATELY 07/02/2022 AT [facility] -- AWAITING PAPERWORK FROM FAMILY. DISCUSSED WITH INFECTION CONTROL TEAM AT [place].     # SUSPECT MODERATE PROTEIN CALORIE MALNUTRITION  - NUTRITIONAL SUPPLEMENTATION    # HYPOKALEMIA  - REPLETE WITH SUPPLEMENT  - MONITORING CLOSELY    # GI PPX; AT [facility] Patient is a 78 yo female who lives at home with bedbound , and has 2 HHA, PMH of MDS initially in remission , now mutated, and complicated by severe anemia and thrombocytopenia requiring blood transfusions, last of which was yesterday, 2 U, for Hgb of 4, dc home stable,  who comes in complaining of Melena, described as dark diarrhea, following hard stools the day prior.    OF note patient also had transfusion 2 weeks ago, at [facility] where she was found with hgb of 3. Patient was also found with one episode of A-fib RVR which resolved, as well as asymptomatic bacteriuria which was treated.    Now presents with dark stools, lightheadedness, low appetite, not eaten since 4 days ago, nausea with dry heaves, no vomiting, denies fever, chills, changes in urine output.    In the ED patient was AF, and sats 98% on RA, however  BP 80/50 EKG showing A-fib and   Exam: notable for cachectic pleasant female, with no distress, pale skin, but benign exam overall  Labs: showed Hgb of 6.5, K 3, BNP 5K, COVID +Ve (not new) occult blood was +Ve    CODE fusion was called and 2U of blood was given and a liter bolus  For the A fib RVR diltiazem was given at 5mg   K was replaced  lasix and CXR ordered  due to s/o volume overload  and patient was admitted for GI bleed    # CASE D/W DAUGHTER HOLLY CARNEY VIA PHONE @ [phone] [7/21]; CONVEYED THAT PROGNOSIS IS GUARDED, PATIENT AND FAMILY VERBALIZED UNDERSTANDING    # R/O GI BLEED  # NORMOCYTIC ANEMIA, MDS  # THROMBOCYTOPENIA   # CHRONIC CONSTIPATION, HX OF HEMORRHOID  - GIVEN 2 UNITS PRBC, 1 UNIT PLATELETS AND 1 UNIT FFP; PATIENT WAS ALSO TRANSFUSED 7/19/22   - F/U POST TRANSFUSION CBC, MONITOR SYMPTOMS CLOSELY DURING TRANSFUSION  - TREND HGB, PPI IV BID  - NOTED CT A/P  - TRANSFUSION THRESHOLD HGB < 7  - TRANSFUSION THRESHOLD FOR PLT < 10 [IF NO BLEEDING], < 50 [IF BLEEDING]  - GASTROENTEROLOGY CONSULT  - HEME/ONC CONSULT    - EGD/COLONOSCOPY IN 2022 - WILL OBTAIN RECORDS    - LOW THRESHOLD FOR CRITICAL CARE CONSULT, D/W RESIDENT TEAM    # AFIB W/ RVR - IMPROVED  - PRN RATE CONTROL  - MONITOR ON TELEMETRY  - F/U ECHOCARDIOGRAM  - HOLD A/C GIVEN CONCERN FOR BLEEDING; DISCUSSED WITH PATIENT AND WITH DAUGHTER REGARDING BLEEDING RISK VS. STROKE RISK. BOTH VERBALIZED UNDERSTANDING   - STARTED ON METOPROLOL PER CARDIOLOGY  - CARDIOLOGY CONSULT    # COVID19 INFECTION  - PATIENT IS S/P ? MEDICATION [ SUSPECT PAXLOVID PER DAUGHTER ]  - MONITOR PO2, MONITOR INFLAMMATORY MARKERS  - MAY DEFER CONTACT AND DROPLET ISOLATION PRECAUTIONS  -- PER FAMILY PATIENT TESTED POSITIVE ON APPROXIMATELY 07/02/2022 AT [facility] -- AWAITING PAPERWORK FROM FAMILY. DISCUSSED WITH INFECTION CONTROL TEAM AT [place].     # SUSPECT MODERATE PROTEIN CALORIE MALNUTRITION  - NUTRITIONAL SUPPLEMENTATION    # HYPOKALEMIA  - REPLETE WITH SUPPLEMENT  - MONITORING CLOSELY    # GI PPX; AT [facility]

## 2022-07-21 NOTE — CONSULT NOTE ADULT - PROBLEM SELECTOR RECOMMENDATION 9
- P/w low hemoglobin of 4 and hypokalemia and dark stools in the setting of Myelodysplastic syndrome  - Patient has received 2 units pRBC, 2 units platelets, and 1 unit plasma since admission  - Hbg/Hct: 8.8/25.6  - Platelet: 92  - (+) FOBT  - CT angio: No bowel obstruction. Appendix is not visualized. No evidence of inflammation in the pericecal region. Moderate sliding hiatus hernia. No intraluminal contrast extravasation. Mild colonic fecal burden. No CT evidence of active GI bleed. Constipated colon.  - PPI BID  - Trend CBC  - If Hg < 7, transfuse - P/w low hemoglobin of 4 and hypokalemia and dark stools in the setting of Myelodysplastic syndrome  - Patient has received 2 units pRBC, 2 units platelets, and 1 unit plasma since admission  - Hbg/Hct: 8.8/25.6  - Platelet: 92  - (+) FOBT  - CT angio: No bowel obstruction. Appendix is not visualized. No evidence of inflammation in the pericecal region. Moderate sliding hiatus hernia. No intraluminal contrast extravasation. Mild colonic fecal burden. No CT evidence of active GI bleed. Constipated colon.  - PPI BID  - Can advance diet to clear liquid diet as tolerated  - Trend CBC  - If Hg < 7, transfuse

## 2022-07-22 DIAGNOSIS — E87.6 HYPOKALEMIA: ICD-10-CM

## 2022-07-22 LAB
ALBUMIN SERPL ELPH-MCNC: 2.4 G/DL — LOW (ref 3.5–5)
ALBUMIN SERPL ELPH-MCNC: 2.7 G/DL — LOW (ref 3.5–5)
ALP SERPL-CCNC: 45 U/L — SIGNIFICANT CHANGE UP (ref 40–120)
ALP SERPL-CCNC: 54 U/L — SIGNIFICANT CHANGE UP (ref 40–120)
ALT FLD-CCNC: 14 U/L DA — SIGNIFICANT CHANGE UP (ref 10–60)
ALT FLD-CCNC: 17 U/L DA — SIGNIFICANT CHANGE UP (ref 10–60)
ANION GAP SERPL CALC-SCNC: 5 MMOL/L — SIGNIFICANT CHANGE UP (ref 5–17)
ANION GAP SERPL CALC-SCNC: 6 MMOL/L — SIGNIFICANT CHANGE UP (ref 5–17)
ANISOCYTOSIS BLD QL: SLIGHT — SIGNIFICANT CHANGE UP
AST SERPL-CCNC: 17 U/L — SIGNIFICANT CHANGE UP (ref 10–40)
AST SERPL-CCNC: 32 U/L — SIGNIFICANT CHANGE UP (ref 10–40)
BASOPHILS # BLD AUTO: 0.01 K/UL — SIGNIFICANT CHANGE UP (ref 0–0.2)
BASOPHILS NFR BLD AUTO: 0.2 % — SIGNIFICANT CHANGE UP (ref 0–2)
BILIRUB SERPL-MCNC: 0.9 MG/DL — SIGNIFICANT CHANGE UP (ref 0.2–1.2)
BILIRUB SERPL-MCNC: 1.1 MG/DL — SIGNIFICANT CHANGE UP (ref 0.2–1.2)
BUN SERPL-MCNC: 20 MG/DL — HIGH (ref 7–18)
BUN SERPL-MCNC: 23 MG/DL — HIGH (ref 7–18)
CALCIUM SERPL-MCNC: 8.2 MG/DL — LOW (ref 8.4–10.5)
CALCIUM SERPL-MCNC: 8.4 MG/DL — SIGNIFICANT CHANGE UP (ref 8.4–10.5)
CHLORIDE SERPL-SCNC: 105 MMOL/L — SIGNIFICANT CHANGE UP (ref 96–108)
CHLORIDE SERPL-SCNC: 105 MMOL/L — SIGNIFICANT CHANGE UP (ref 96–108)
CO2 SERPL-SCNC: 24 MMOL/L — SIGNIFICANT CHANGE UP (ref 22–31)
CO2 SERPL-SCNC: 26 MMOL/L — SIGNIFICANT CHANGE UP (ref 22–31)
CREAT SERPL-MCNC: 0.78 MG/DL — SIGNIFICANT CHANGE UP (ref 0.5–1.3)
CREAT SERPL-MCNC: 1.01 MG/DL — SIGNIFICANT CHANGE UP (ref 0.5–1.3)
EGFR: 58 ML/MIN/1.73M2 — LOW
EGFR: 79 ML/MIN/1.73M2 — SIGNIFICANT CHANGE UP
EOSINOPHIL # BLD AUTO: 0.03 K/UL — SIGNIFICANT CHANGE UP (ref 0–0.5)
EOSINOPHIL NFR BLD AUTO: 0.6 % — SIGNIFICANT CHANGE UP (ref 0–6)
GLUCOSE SERPL-MCNC: 85 MG/DL — SIGNIFICANT CHANGE UP (ref 70–99)
GLUCOSE SERPL-MCNC: 87 MG/DL — SIGNIFICANT CHANGE UP (ref 70–99)
HCT VFR BLD CALC: 18.1 % — CRITICAL LOW (ref 34.5–45)
HCT VFR BLD CALC: 22.5 % — LOW (ref 34.5–45)
HCT VFR BLD CALC: 25.1 % — LOW (ref 34.5–45)
HGB BLD-MCNC: 6 G/DL — CRITICAL LOW (ref 11.5–15.5)
HGB BLD-MCNC: 7.5 G/DL — LOW (ref 11.5–15.5)
HGB BLD-MCNC: 8.3 G/DL — LOW (ref 11.5–15.5)
HYPOCHROMIA BLD QL: SLIGHT — SIGNIFICANT CHANGE UP
IMM GRANULOCYTES NFR BLD AUTO: 2.6 % — HIGH (ref 0–1.5)
LG PLATELETS BLD QL AUTO: SLIGHT — SIGNIFICANT CHANGE UP
LG PLATELETS BLD QL AUTO: SLIGHT — SIGNIFICANT CHANGE UP
LYMPHOCYTES # BLD AUTO: 1.17 K/UL — SIGNIFICANT CHANGE UP (ref 1–3.3)
LYMPHOCYTES # BLD AUTO: 25.1 % — SIGNIFICANT CHANGE UP (ref 13–44)
MAGNESIUM SERPL-MCNC: 2.2 MG/DL — SIGNIFICANT CHANGE UP (ref 1.6–2.6)
MANUAL SMEAR VERIFICATION: SIGNIFICANT CHANGE UP
MANUAL SMEAR VERIFICATION: SIGNIFICANT CHANGE UP
MCHC RBC-ENTMCNC: 30.6 PG — SIGNIFICANT CHANGE UP (ref 27–34)
MCHC RBC-ENTMCNC: 30.9 PG — SIGNIFICANT CHANGE UP (ref 27–34)
MCHC RBC-ENTMCNC: 31 PG — SIGNIFICANT CHANGE UP (ref 27–34)
MCHC RBC-ENTMCNC: 33.1 GM/DL — SIGNIFICANT CHANGE UP (ref 32–36)
MCHC RBC-ENTMCNC: 33.1 GM/DL — SIGNIFICANT CHANGE UP (ref 32–36)
MCHC RBC-ENTMCNC: 33.3 GM/DL — SIGNIFICANT CHANGE UP (ref 32–36)
MCV RBC AUTO: 92.3 FL — SIGNIFICANT CHANGE UP (ref 80–100)
MCV RBC AUTO: 92.6 FL — SIGNIFICANT CHANGE UP (ref 80–100)
MCV RBC AUTO: 93.7 FL — SIGNIFICANT CHANGE UP (ref 80–100)
MICROCYTES BLD QL: SLIGHT — SIGNIFICANT CHANGE UP
MONOCYTES # BLD AUTO: 0.5 K/UL — SIGNIFICANT CHANGE UP (ref 0–0.9)
MONOCYTES NFR BLD AUTO: 10.7 % — SIGNIFICANT CHANGE UP (ref 2–14)
NEUTROPHILS # BLD AUTO: 2.83 K/UL — SIGNIFICANT CHANGE UP (ref 1.8–7.4)
NEUTROPHILS NFR BLD AUTO: 60.8 % — SIGNIFICANT CHANGE UP (ref 43–77)
NRBC # BLD: 1 /100 WBCS — HIGH (ref 0–0)
NRBC # BLD: 1 /100 WBCS — HIGH (ref 0–0)
NRBC # BLD: 2 /100 WBCS — HIGH (ref 0–0)
PHOSPHATE SERPL-MCNC: 1.9 MG/DL — LOW (ref 2.5–4.5)
PLAT MORPH BLD: NORMAL — SIGNIFICANT CHANGE UP
PLAT MORPH BLD: NORMAL — SIGNIFICANT CHANGE UP
PLATELET # BLD AUTO: 102 K/UL — LOW (ref 150–400)
PLATELET # BLD AUTO: 105 K/UL — LOW (ref 150–400)
PLATELET # BLD AUTO: 84 K/UL — LOW (ref 150–400)
PLATELET COUNT - ESTIMATE: ABNORMAL
POIKILOCYTOSIS BLD QL AUTO: SLIGHT — SIGNIFICANT CHANGE UP
POTASSIUM SERPL-MCNC: 3.9 MMOL/L — SIGNIFICANT CHANGE UP (ref 3.5–5.3)
POTASSIUM SERPL-MCNC: 4.4 MMOL/L — SIGNIFICANT CHANGE UP (ref 3.5–5.3)
POTASSIUM SERPL-SCNC: 3.9 MMOL/L — SIGNIFICANT CHANGE UP (ref 3.5–5.3)
POTASSIUM SERPL-SCNC: 4.4 MMOL/L — SIGNIFICANT CHANGE UP (ref 3.5–5.3)
PROT SERPL-MCNC: 4.8 G/DL — LOW (ref 6–8.3)
PROT SERPL-MCNC: 5.6 G/DL — LOW (ref 6–8.3)
RBC # BLD: 1.96 M/UL — LOW (ref 3.8–5.2)
RBC # BLD: 2.43 M/UL — LOW (ref 3.8–5.2)
RBC # BLD: 2.68 M/UL — LOW (ref 3.8–5.2)
RBC # FLD: 15.9 % — HIGH (ref 10.3–14.5)
RBC # FLD: 16.3 % — HIGH (ref 10.3–14.5)
RBC # FLD: 16.4 % — HIGH (ref 10.3–14.5)
RBC BLD AUTO: ABNORMAL
RBC BLD AUTO: ABNORMAL
SODIUM SERPL-SCNC: 135 MMOL/L — SIGNIFICANT CHANGE UP (ref 135–145)
SODIUM SERPL-SCNC: 136 MMOL/L — SIGNIFICANT CHANGE UP (ref 135–145)
SPHEROCYTES BLD QL SMEAR: SLIGHT — SIGNIFICANT CHANGE UP
WBC # BLD: 4.65 K/UL — SIGNIFICANT CHANGE UP (ref 3.8–10.5)
WBC # BLD: 4.67 K/UL — SIGNIFICANT CHANGE UP (ref 3.8–10.5)
WBC # BLD: 6.45 K/UL — SIGNIFICANT CHANGE UP (ref 3.8–10.5)
WBC # FLD AUTO: 4.65 K/UL — SIGNIFICANT CHANGE UP (ref 3.8–10.5)
WBC # FLD AUTO: 4.67 K/UL — SIGNIFICANT CHANGE UP (ref 3.8–10.5)
WBC # FLD AUTO: 6.45 K/UL — SIGNIFICANT CHANGE UP (ref 3.8–10.5)

## 2022-07-22 PROCEDURE — 99232 SBSQ HOSP IP/OBS MODERATE 35: CPT

## 2022-07-22 RX ORDER — LABETALOL HCL 100 MG
5 TABLET ORAL ONCE
Refills: 0 | Status: COMPLETED | OUTPATIENT
Start: 2022-07-22 | End: 2022-07-22

## 2022-07-22 RX ORDER — POTASSIUM PHOSPHATE, MONOBASIC POTASSIUM PHOSPHATE, DIBASIC 236; 224 MG/ML; MG/ML
15 INJECTION, SOLUTION INTRAVENOUS ONCE
Refills: 0 | Status: COMPLETED | OUTPATIENT
Start: 2022-07-22 | End: 2022-07-22

## 2022-07-22 RX ORDER — MULTIVIT WITH MIN/MFOLATE/K2 340-15/3 G
1 POWDER (GRAM) ORAL ONCE
Refills: 0 | Status: COMPLETED | OUTPATIENT
Start: 2022-07-22 | End: 2022-07-22

## 2022-07-22 RX ADMIN — Medication 25 MILLIGRAM(S): at 17:50

## 2022-07-22 RX ADMIN — Medication 5 MILLIGRAM(S): at 01:51

## 2022-07-22 RX ADMIN — Medication 25 MILLIGRAM(S): at 05:49

## 2022-07-22 RX ADMIN — PANTOPRAZOLE SODIUM 40 MILLIGRAM(S): 20 TABLET, DELAYED RELEASE ORAL at 17:53

## 2022-07-22 RX ADMIN — PANTOPRAZOLE SODIUM 40 MILLIGRAM(S): 20 TABLET, DELAYED RELEASE ORAL at 05:49

## 2022-07-22 RX ADMIN — Medication 1 BOTTLE: at 17:53

## 2022-07-22 RX ADMIN — POTASSIUM PHOSPHATE, MONOBASIC POTASSIUM PHOSPHATE, DIBASIC 62.5 MILLIMOLE(S): 236; 224 INJECTION, SOLUTION INTRAVENOUS at 17:53

## 2022-07-22 NOTE — ADVANCED PRACTICE NURSE CONSULT - ASSESSMENT
This is a 77yr old female patient admitted for Hypotension, presenting with the following:  -There is a Stage 1 Pressure Injury to the Bilateral Heels, as evident by non-blanchable erythema  -There is a Stage 2 Pressure Injury to the L. Gluteus with red tissue and scant drainage

## 2022-07-22 NOTE — PROGRESS NOTE ADULT - SUBJECTIVE AND OBJECTIVE BOX
GI Progress Note    Patient is a 77y old  Female who presents with a chief complaint of Melena (22 Jul 2022 09:41)      GI INTERVAL HPI/OVERNIGHT EVENTS: Pt seen at bedside. NAD, no overnight complaints. Pt reports no BM today. (dark brown in color yesterday). Hb 6 this morning, 2U pRBCs ordered. No overt signs of bleeding.    MEDICATIONS  (STANDING):  metoprolol tartrate 25 milliGRAM(s) Oral two times a day  pantoprazole  Injectable 40 milliGRAM(s) IV Push every 12 hours  potassium phosphate IVPB 15 milliMole(s) IV Intermittent once    MEDICATIONS  (PRN):      __________________________________________________  REVIEW OF SYSTEMS:            ________________________________________________  PHYSICAL EXAM    Vital Signs Last 24 Hrs  T(C): 36.7 (22 Jul 2022 10:44), Max: 37.7 (21 Jul 2022 23:35)  T(F): 98 (22 Jul 2022 10:44), Max: 99.8 (21 Jul 2022 23:35)  HR: 79 (22 Jul 2022 10:44) (73 - 97)  BP: 128/54 (22 Jul 2022 10:44) (102/83 - 170/66)  BP(mean): 79 (22 Jul 2022 10:44) (70 - 88)  RR: 18 (22 Jul 2022 10:44) (18 - 18)  SpO2: 96% (22 Jul 2022 10:44) (96% - 99%)    Parameters below as of 22 Jul 2022 10:44  Patient On (Oxygen Delivery Method): room air        _________________________________________________  LABS:                        6.0    4.65  )-----------( 105      ( 22 Jul 2022 05:47 )             18.1     07-22    136  |  105  |  23<H>  ----------------------------<  85  3.9   |  26  |  0.78    Ca    8.2<L>      22 Jul 2022 05:47  Phos  1.9     07-22  Mg     2.2     07-22    TPro  4.8<L>  /  Alb  2.4<L>  /  TBili  0.9  /  DBili  x   /  AST  17  /  ALT  14  /  AlkPhos  45  07-22    PT/INR - ( 20 Jul 2022 13:55 )   PT: 12.9 sec;   INR: 1.08 ratio         PTT - ( 20 Jul 2022 13:55 )  PTT:23.5 sec    CAPILLARY BLOOD GLUCOSE        COVID-19 PCR: Detected (20 Jul 2022 13:55)  COVID-19 PCR: NotDetec (12 May 2022 16:05)              RADIOLOGY & ADDITIONAL TESTS:    Imaging Personally Reviewed:  YES/NO        Consultant(s) Notes Reviewed:   YES/ No         GI Progress Note    Patient is a 77y old  Female who presents with a chief complaint of Melena (22 Jul 2022 09:41)      GI INTERVAL HPI/OVERNIGHT EVENTS: Pt seen at bedside. NAD, no overnight complaints. Pt reports no BM today. (dark brown in color yesterday). Hb 6 this morning, 2U pRBCs ordered. No overt signs of bleeding.    MEDICATIONS  (STANDING):  metoprolol tartrate 25 milliGRAM(s) Oral two times a day  pantoprazole  Injectable 40 milliGRAM(s) IV Push every 12 hours  potassium phosphate IVPB 15 milliMole(s) IV Intermittent once    MEDICATIONS  (PRN):      __________________________________________________  REVIEW OF SYSTEMS:  CONSTITUTIONAL:  No weight loss, fever, chills, weakness or fatigue.  HEENT:  Eyes:  No visual loss, blurred vision, double vision or yellow sclerae. Ears, Nose, Throat:  No hearing loss, sneezing, congestion, runny nose or sore throat.  SKIN:  No rash or itching.  CARDIOVASCULAR:  No chest pain, chest pressure or chest discomfort. No palpitations or edema.  RESPIRATORY: No SOB. No  cough or sputum.  GASTROINTESTINAL:  SEE HPI  GENITOURINARY:  No dysuria, hematuria, urinary frequency  NEUROLOGICAL:  No headache, dizziness, syncope, paralysis, ataxia, numbness or tingling in the extremities. No change in bowel or bladder control.  MUSCULOSKELETAL:  No muscle, back pain, joint pain or stiffness.  HEMATOLOGIC:  No anemia, bleeding or bruising.  LYMPHATICS:  No enlarged nodes. No history of splenectomy.  PSYCHIATRIC:  No history of depression or anxiety.  ENDOCRINOLOGIC:  No reports of sweating, cold or heat intolerance. No polyuria or polydipsia.              ________________________________________________  PHYSICAL EXAM  Gen: NAD  HEENT: PERRL, anicteric, no oral mucositis  Resp: Clear breath sounds on auscultation. No rales, no wheezing  CVS: S1S2 present, regular  GI: BS(+), Soft, ND, NT  Extremities : BLE No edema or calf tenderness. PPP  Neuro: Awake/alert.  no new neuro deficits  Skin: warm,dry,no rash      Vital Signs Last 24 Hrs  T(C): 36.7 (22 Jul 2022 10:44), Max: 37.7 (21 Jul 2022 23:35)  T(F): 98 (22 Jul 2022 10:44), Max: 99.8 (21 Jul 2022 23:35)  HR: 79 (22 Jul 2022 10:44) (73 - 97)  BP: 128/54 (22 Jul 2022 10:44) (102/83 - 170/66)  BP(mean): 79 (22 Jul 2022 10:44) (70 - 88)  RR: 18 (22 Jul 2022 10:44) (18 - 18)  SpO2: 96% (22 Jul 2022 10:44) (96% - 99%)    Parameters below as of 22 Jul 2022 10:44  Patient On (Oxygen Delivery Method): room air        _________________________________________________  LABS:                        6.0    4.65  )-----------( 105      ( 22 Jul 2022 05:47 )             18.1     07-22    136  |  105  |  23<H>  ----------------------------<  85  3.9   |  26  |  0.78    Ca    8.2<L>      22 Jul 2022 05:47  Phos  1.9     07-22  Mg     2.2     07-22    TPro  4.8<L>  /  Alb  2.4<L>  /  TBili  0.9  /  DBili  x   /  AST  17  /  ALT  14  /  AlkPhos  45  07-22    PT/INR - ( 20 Jul 2022 13:55 )   PT: 12.9 sec;   INR: 1.08 ratio         PTT - ( 20 Jul 2022 13:55 )  PTT:23.5 sec    CAPILLARY BLOOD GLUCOSE        COVID-19 PCR: Detected (20 Jul 2022 13:55)  COVID-19 PCR: NotDetec (12 May 2022 16:05)              RADIOLOGY & ADDITIONAL TESTS:    Imaging Personally Reviewed:  YES/NO        Consultant(s) Notes Reviewed:   YES/ No

## 2022-07-22 NOTE — PROGRESS NOTE ADULT - PROBLEM SELECTOR PLAN 1
Patient comes in with dark stools in the setting of Myelodysplastic syndrome  has a positive fobt   has had hypotension  HR 140s initially, now heart rate between 107-97  -CT angio 7/19/22 = no signs of acute GI bleed  -IV PPI BID  -Type and screen, transfuse as needed  -will transfuse platelets and ffp due to massive transfusion  -cbc q6-8, monitor H/H and platelets   -Had a colonoscopy/endo April, 2022 at [facility] (last admission to the hospital)   -Dr. Gerard onboard, will follow on their plan for a scope Patient comes in with dark stools in the setting of Myelodysplastic syndrome  -CT angio 7/19/22 = no signs of acute GI bleed  - Hb of 6.0 on 7/22, pt. was transfused 1 unit of blood  - platelets 150, Heme recs. platelet >50, otherwise transfuse  -IV PPI BID  -cbc q6-8, monitor H/H and platelets   -f/u post transfusion cbc  -Had a colonoscopy/endo April, 2022 at [facility] (last admission to the hospital)   -GI = no indication to scope, will trend H/H and see if Hb goes up after the 1 unit today.

## 2022-07-22 NOTE — PROGRESS NOTE ADULT - SUBJECTIVE AND OBJECTIVE BOX
C A R D I O L O G Y  **********************************     DATE OF SERVICE: 07-22-22    Patient denies chest pain or shortness of breath.   Review of systems otherwise (-)  	  MEDICATIONS:  MEDICATIONS  (STANDING):  metoprolol tartrate 25 milliGRAM(s) Oral two times a day  pantoprazole  Injectable 40 milliGRAM(s) IV Push every 12 hours      LABS:	 	    CARDIAC MARKERS:        Troponin I, High Sensitivity Result: 48.0 ng/L (07-20-22 @ 13:55)                              6.0    4.65  )-----------( 105      ( 22 Jul 2022 05:47 )             18.1     Hemoglobin: 6.0 g/dL (07-22 @ 05:47)  Hemoglobin: 8.8 g/dL (07-21 @ 05:00)  Hemoglobin: 6.5 g/dL (07-20 @ 13:55)  Hemoglobin: 4.3 g/dL (07-19 @ 17:50)      07-22    136  |  105  |  23<H>  ----------------------------<  85  3.9   |  26  |  0.78    Ca    8.2<L>      22 Jul 2022 05:47  Phos  1.9     07-22  Mg     2.2     07-22    TPro  4.8<L>  /  Alb  2.4<L>  /  TBili  0.9  /  DBili  x   /  AST  17  /  ALT  14  /  AlkPhos  45  07-22    Creatinine Trend: 0.78<--, 0.69<--, 0.73<--, 0.85<--, 0.96<--, 1.03<--        PHYSICAL EXAM:  T(C): 37.1 (07-22-22 @ 08:03), Max: 37.7 (07-21-22 @ 11:05)  HR: 73 (07-22-22 @ 08:03) (73 - 97)  BP: 103/54 (07-22-22 @ 08:03) (102/83 - 170/66)  RR: 18 (07-22-22 @ 08:03) (18 - 18)  SpO2: 98% (07-22-22 @ 08:03) (97% - 99%)  Wt(kg): --  I&O's Summary        Gen: Appears well in NAD  HEENT:  (-)icterus (-)pallor  CV: N S1 S2 1/6 KECIA (+)2 Pulses B/l  Resp:  Clear to ausculatation B/L, normal effort  GI: (+) BS Soft, NT, ND  Lymph:  (-)Edema, (-)obvious lymphadenopathy  Skin: Warm to touch, Normal turgor  Psych: Appropriate mood and affect      TELEMETRY: 	  sinus        ASSESSMENT/PLAN: 	77y  Female  who lives at home with bedbound , and has 2 HHA, PMH of MDS initially in remission , now mutated, and complicated by severe anemia and thrombocytopenia requiring blood transfusions, last of which was yesterday, 2 U, for Hgb of 4, dc home stable,  who comes in complaining of Melena, noted with rapid afib.    - Appears her MDS precluded use of anticoagulation in the past, now with a suspected GI bleed as well she is definitely not a candidate for anticoagulation at present  - Echo  - Cont Lopressor 25 mg PO BID  - Heme and GI f/u  - Monitor H/H and platelets     Siddhartha Duval MD, [facility]  BEEPER [phone]

## 2022-07-22 NOTE — CHART NOTE - NSCHARTNOTEFT_GEN_A_CORE
HPI:  Patient is a 76 yo female who lives at home with bedbound , and has 2 HHA, PMH of MDS initially in remission , now mutated, and complicated by severe anemia and thrombocytopenia requiring blood transfusions, last of which was yesterday, 2 U, for Hgb of 4, dc home stable,  who comes in complaining of Melena, described as dark diarrhea, following hard stools the day prior.    OF note patient also had transfusion 2 weeks ago, at [facility] where she was found with hgb of 3. Patient was also found with one episode of A-fib RVR which resolved, as well as asymptomatic bacteriuria which was treated.    Now presents with dark stools, lightheadedness, low appetite, not eaten since 4 dyas ago, nausea with dry heaves, no vomiting, denies fever, chills, changes in urine output.    In the ED patient was AF, and sats 98% on RA, however  BP 80/50 EKG showing A-fib and   Exam: notable for cachectic pleasant female, with no distress, pale skin, but benign exam overall  Labs: showed Hgb of 6.5, K 3, BNP 5K, COVID +Ve (not new) occult blood was +Ve    CODE fusion was called and 2U of blood was given and a liter bolus  For the A fib RVR diltiazem was given at 5mg   K was replaced  lasix and CXR ordered  due to s/o volume overload  and patient was admitted for GI bleed       (20 Jul 2022 17:32)        EVENT: GI BLEED    SUBJECTIVE:  Patient seen and examined at bedside. Nurse called because of Bleeding per rectum, Noted with red blood and clots in toilet bowl, and saturated gown. Patient states that this is the largest BM that she's had so far with blood since admission. Currenlty taking Mag citrate for unclear reasons. Last h/h 8.3 after 1 PRBC. Patient denies any pain. Hemodynamically stable at this time.     OBJECTIVE:  Vital Signs Last 24 Hrs  T(C): 36.7 (22 Jul 2022 19:27), Max: 37.7 (21 Jul 2022 23:35)  T(F): 98.1 (22 Jul 2022 19:27), Max: 99.8 (21 Jul 2022 23:35)  HR: 67 (22 Jul 2022 19:27) (67 - 97)  BP: 118/63 (22 Jul 2022 19:27) (103/54 - 170/66)  BP(mean): 80 (22 Jul 2022 11:35) (70 - 80)  RR: 19 (22 Jul 2022 19:27) (18 - 19)  SpO2: 97% (22 Jul 2022 19:27) (96% - 100%)    Parameters below as of 22 Jul 2022 19:27  Patient On (Oxygen Delivery Method): room air      LABS:                        8.3    6.45  )-----------( 102      ( 22 Jul 2022 21:05 )             25.1     07-22    135  |  105  |  20<H>  ----------------------------<  87  4.4   |  24  |  1.01    Ca    8.4      22 Jul 2022 21:05  Phos  1.9     07-22  Mg     2.2     07-22    TPro  5.6<L>  /  Alb  2.7<L>  /  TBili  1.1  /  DBili  x   /  AST  32  /  ALT  17  /  AlkPhos  54  07-22      EKG:   IMGAGING:    ASSESSMENT:    # GI Bleed      PLAN:   Will continue to monitor Hgb. f/u h/h at 1am, if < 7 will give 1u PRBC  Pt already on protonix  GI team following.

## 2022-07-22 NOTE — PROGRESS NOTE ADULT - ATTENDING COMMENTS
Patient is a 78 yo female who lives at home with bedbound , and has 2 HHA, PMH of MDS initially in remission , now mutated, and complicated by severe anemia and thrombocytopenia requiring blood transfusions, last of which was yesterday, 2 U, for Hgb of 4, dc home stable,  who comes in complaining of Melena, described as dark diarrhea, following hard stools the day prior.    OF note patient also had transfusion 2 weeks ago, at [facility] where she was found with hgb of 3. Patient was also found with one episode of A-fib RVR which resolved, as well as asymptomatic bacteriuria which was treated.    Now presents with dark stools, lightheadedness, low appetite, not eaten since 4 days ago, nausea with dry heaves, no vomiting, denies fever, chills, changes in urine output.    In the ED patient was AF, and sats 98% on RA, however  BP 80/50 EKG showing A-fib and   Exam: notable for cachectic pleasant female, with no distress, pale skin, but benign exam overall  Labs: showed Hgb of 6.5, K 3, BNP 5K, COVID +Ve (not new) occult blood was +Ve    CODE fusion was called and 2U of blood was given and a liter bolus  For the A fib RVR diltiazem was given at 5mg   K was replaced  lasix and CXR ordered  due to s/o volume overload  and patient was admitted for GI bleed    # CASE D/W DAUGHTER HOLLY CARNEY VIA PHONE @ [phone] [7/21]; CONVEYED THAT PROGNOSIS IS GUARDED, PATIENT AND FAMILY VERBALIZED UNDERSTANDING    # R/O GI BLEED  # NORMOCYTIC ANEMIA, MDS  # THROMBOCYTOPENIA   # CHRONIC CONSTIPATION, HX OF HEMORRHOID  - GIVEN 2 UNITS PRBC, 1 UNIT PLATELETS AND 1 UNIT FFP; PATIENT WAS ALSO TRANSFUSED 7/19/22   - F/U POST TRANSFUSION CBC, MONITOR SYMPTOMS CLOSELY DURING TRANSFUSION  - TREND HGB, PPI IV BID  - NOTED CT A/P  - TRANSFUSION THRESHOLD HGB < 7  - TRANSFUSION THRESHOLD FOR PLT < 10 [IF NO BLEEDING], < 50 [IF BLEEDING]  - GASTROENTEROLOGY CONSULT  - HEME/ONC CONSULT    - EGD/COLONOSCOPY IN 2022 - WILL OBTAIN RECORDS    - LOW THRESHOLD FOR CRITICAL CARE CONSULT, D/W RESIDENT TEAM    # AFIB W/ RVR - IMPROVED  - PRN RATE CONTROL  - MONITOR ON TELEMETRY  - F/U ECHOCARDIOGRAM  - HOLD A/C GIVEN CONCERN FOR BLEEDING; DISCUSSED WITH PATIENT AND WITH DAUGHTER REGARDING BLEEDING RISK VS. STROKE RISK. BOTH VERBALIZED UNDERSTANDING   - STARTED ON METOPROLOL PER CARDIOLOGY  - CARDIOLOGY CONSULT    # COVID19 INFECTION  - PATIENT IS S/P ? MEDICATION [ SUSPECT PAXLOVID PER DAUGHTER ]  - MONITOR PO2, MONITOR INFLAMMATORY MARKERS  - MAY DEFER CONTACT AND DROPLET ISOLATION PRECAUTIONS  -- PER FAMILY PATIENT TESTED POSITIVE ON APPROXIMATELY 07/02/2022 AT [facility] -- AWAITING PAPERWORK FROM FAMILY. DISCUSSED WITH INFECTION CONTROL TEAM AT [place].     # SUSPECT MODERATE PROTEIN CALORIE MALNUTRITION  - NUTRITIONAL SUPPLEMENTATION    # HYPOKALEMIA  - REPLETE WITH SUPPLEMENT  - MONITORING CLOSELY    # GI PPX; AT [facility] Patient is a 78 yo female who lives at home with bedbound , and has 2 HHA, PMH of MDS initially in remission , now mutated, and complicated by severe anemia and thrombocytopenia requiring blood transfusions, last of which was yesterday, 2 U, for Hgb of 4, dc home stable,  who comes in complaining of Melena, described as dark diarrhea, following hard stools the day prior.    OF note patient also had transfusion 2 weeks ago, at [facility] where she was found with hgb of 3. Patient was also found with one episode of A-fib RVR which resolved, as well as asymptomatic bacteriuria which was treated.    Now presents with dark stools, lightheadedness, low appetite, not eaten since 4 days ago, nausea with dry heaves, no vomiting, denies fever, chills, changes in urine output.    In the ED patient was AF, and sats 98% on RA, however  BP 80/50 EKG showing A-fib and   Exam: notable for cachectic pleasant female, with no distress, pale skin, but benign exam overall  Labs: showed Hgb of 6.5, K 3, BNP 5K, COVID +Ve (not new) occult blood was +Ve    CODE fusion was called and 2U of blood was given and a liter bolus  For the A fib RVR diltiazem was given at 5mg   K was replaced  lasix and CXR ordered  due to s/o volume overload  and patient was admitted for GI bleed    # CASE D/W DAUGHTER HOLLY CARNEY VIA PHONE @ [phone] [7/21]; CONVEYED THAT PROGNOSIS IS GUARDED, PATIENT AND FAMILY VERBALIZED UNDERSTANDING    # R/O GI BLEED  # NORMOCYTIC ANEMIA, MDS  # THROMBOCYTOPENIA   # CHRONIC CONSTIPATION, HX OF HEMORRHOID  - GIVEN 2 UNITS PRBC, 2 UNITS PLATELETS AND 1 UNIT FFP; PATIENT WAS ALSO TRANSFUSED 7/19/22   - F/U POST TRANSFUSION CBC, MONITOR SYMPTOMS CLOSELY DURING TRANSFUSION  - TREND HGB, PPI IV BID  - NOTED CT A/P  - TRANSFUSION THRESHOLD HGB < 7  - TRANSFUSION THRESHOLD FOR PLT < 10 [IF NO BLEEDING], < 50 [IF BLEEDING]  - GASTROENTEROLOGY CONSULT  - HEME/ONC CONSULT    - EGD/COLONOSCOPY IN 2022 AND ?CAPSULE ENDOSCOPY - WILL OBTAIN RECORDS    - PLANNED FOR PRBC TRANSFUSION 7/22 ; D/W GI AND HEME/ONC  - NONTENDER EXAM OF ABDOMEN  - WILL MONITOR CLOSELY FOR MELENA/HEMATOCHEZIA/HEMATEMESIS    - LOW THRESHOLD FOR CRITICAL CARE CONSULT, D/W RESIDENT TEAM    # AFIB W/ RVR - IMPROVED  - PRN RATE CONTROL  - MONITOR ON TELEMETRY  - F/U ECHOCARDIOGRAM  - HOLD A/C GIVEN CONCERN FOR BLEEDING; DISCUSSED WITH PATIENT AND WITH DAUGHTER REGARDING BLEEDING RISK VS. STROKE RISK. BOTH VERBALIZED UNDERSTANDING   - STARTED ON METOPROLOL PER CARDIOLOGY  - CARDIOLOGY CONSULT    # COVID19 INFECTION  - PATIENT IS S/P ? MEDICATION [ SUSPECT PAXLOVID PER DAUGHTER ]  - MONITOR PO2, MONITOR INFLAMMATORY MARKERS  - MAY DEFER CONTACT AND DROPLET ISOLATION PRECAUTIONS  -- PER FAMILY PATIENT TESTED POSITIVE ON APPROXIMATELY 07/02/2022 AT [facility] -- AWAITING PAPERWORK FROM FAMILY. DISCUSSED WITH INFECTION CONTROL TEAM AT [facility].     # SUSPECT MODERATE PROTEIN CALORIE MALNUTRITION  - NUTRITIONAL SUPPLEMENTATION    # HYPOKALEMIA  - REPLETE WITH SUPPLEMENT  - MONITORING CLOSELY    # GI PPX; AT [facility]

## 2022-07-22 NOTE — PROGRESS NOTE ADULT - PROBLEM SELECTOR PLAN 1
- P/w low hemoglobin of 4 and hypokalemia and dark stools in the setting of Myelodysplastic syndrome  - Patient has received 3 units pRBC, 2 units platelets, and 1 unit plasma since admission  - Hbg/Hct: 8.8/25.6 ->6/18  - Platelet: 92 -> 105  - (+) FOBT  - CT angio: No bowel obstruction. Appendix is not visualized. No evidence of inflammation in the pericecal region. Moderate sliding hiatus hernia. No intraluminal contrast extravasation. Mild colonic fecal burden. No CT evidence of active GI bleed. Constipated colon.  - PPI BID  - Keep NPO  - Trend CBC  - If Hg < 7, transfuse. - P/w low hemoglobin of 4 and hypokalemia and dark stools in the setting of Myelodysplastic syndrome  - Patient has received 3 units pRBC, 2 units platelets, and 1 unit plasma since admission  - Hbg/Hct: 8.8/25.6 ->6/18  - Platelet: 92 -> 105  - (+) FOBT  - CT angio: No bowel obstruction. Appendix is not visualized. No evidence of inflammation in the pericecal region. Moderate sliding hiatus hernia. No intraluminal contrast extravasation. Mild colonic fecal burden. No CT evidence of active GI bleed. Constipated colon.  - PPI BID  - Repeat H/H, if stable, restart CLD  - Trend CBC  - If Hg < 7, transfuse.

## 2022-07-22 NOTE — ADVANCED PRACTICE NURSE CONSULT - RECOMMEDATIONS
-Clean all wounds with normal saline and apply skin prep to the surrounding skin  -Apply a Foam dressing to the L. Gluteal wound Q 72hrs PRN  -Elevate/float the patients heels using heel protectors and reposition the patient Q 2hrs using wedges or pillows

## 2022-07-22 NOTE — PROGRESS NOTE ADULT - SUBJECTIVE AND OBJECTIVE BOX
HPI:  Patient is a 76 yo female who lives at home with bedbound , and has 2 HHA, PMH of MDS initially in remission , now mutated, and complicated by severe anemia and thrombocytopenia requiring blood transfusions, last of which was yesterday, 2 U, for Hgb of 4, dc home stable,  who comes in complaining of Melena, described as dark diarrhea, following hard stools the day prior.    OF note patient also had transfusion 2 weeks ago, at [facility] where she was found with hgb of 3. Patient was also found with one episode of A-fib RVR which resolved, as well as asymptomatic bacteriuria which was treated.    Now presents with dark stools, lightheadedness, low appetite, not eaten since 4 dyas ago, nausea with dry heaves, no vomiting, denies fever, chills, changes in urine output.    In the ED patient was AF, and sats 98% on RA, however  BP 80/50 EKG showing A-fib and   Exam: notable for cachectic pleasant female, with no distress, pale skin, but benign exam overall  Labs: showed Hgb of 6.5, K 3, BNP 5K, COVID +Ve (not new) occult blood was +Ve    CODE fusion was called and 2U of blood was given and a liter bolus  For the A fib RVR diltiazem was given at 5mg   K was replaced  lasix and CXR ordered  due to s/o volume overload  and patient was admitted for GI bleed       (20 Jul 2022 17:32)     Pt is seen and examined  pt is awake and lying in bed/out of bed to chair  pt seems comfortable and denies any complaints at this time    ROS:  Negative except for:    MEDICATIONS  (STANDING):  metoprolol tartrate 25 milliGRAM(s) Oral two times a day  pantoprazole  Injectable 40 milliGRAM(s) IV Push every 12 hours    MEDICATIONS  (PRN):      Allergies    penicillin (Hives)    Intolerances        Vital Signs Last 24 Hrs  T(C): 37.1 (22 Jul 2022 08:03), Max: 37.7 (21 Jul 2022 11:05)  T(F): 98.8 (22 Jul 2022 08:03), Max: 99.9 (21 Jul 2022 11:05)  HR: 73 (22 Jul 2022 08:03) (73 - 97)  BP: 103/54 (22 Jul 2022 08:03) (102/83 - 170/66)  BP(mean): 70 (22 Jul 2022 08:03) (70 - 88)  RR: 18 (22 Jul 2022 08:03) (18 - 18)  SpO2: 98% (22 Jul 2022 08:03) (97% - 99%)    Parameters below as of 22 Jul 2022 08:03  Patient On (Oxygen Delivery Method): room air        PHYSICAL EXAM  General: adult in NAD  HEENT: clear oropharynx, anicteric sclera, pink conjunctiva  Neck: supple  CV: normal S1/S2 with no murmur rubs or gallops  Lungs: positive air movement b/l ant lungs,clear to auscultation, no wheezes, no rales  Abdomen: soft non-tender non-distended, no hepatosplenomegaly  Ext: no clubbing cyanosis or edema  Skin: no rashes and no petechiae  Neuro: alert and oriented X 4, no focal deficits  LABS:                          6.0    4.65  )-----------( 105      ( 22 Jul 2022 05:47 )             18.1         Mean Cell Volume : 92.3 fl  Mean Cell Hemoglobin : 30.6 pg  Mean Cell Hemoglobin Concentration : 33.1 gm/dL  Auto Neutrophil # : x  Auto Lymphocyte # : x  Auto Monocyte # : x  Auto Eosinophil # : x  Auto Basophil # : x  Auto Neutrophil % : x  Auto Lymphocyte % : x  Auto Monocyte % : x  Auto Eosinophil % : x  Auto Basophil % : x    Serial CBC  Hematocrit 18.1  Hemoglobin 6.0  Plat 105  RBC 1.96  WBC 4.65  Serial CBC  Hematocrit 25.6  Hemoglobin 8.8  Plat 92  RBC 2.87  WBC 12.16  Serial CBC  Hematocrit 20.2  Hemoglobin 6.5  Plat 12  RBC 2.23  WBC 8.65  Serial CBC  Hematocrit 14.7  Hemoglobin 4.3  Plat 15  RBC 1.45  WBC 6.57    07-22    136  |  105  |  23<H>  ----------------------------<  85  3.9   |  26  |  0.78    Ca    8.2<L>      22 Jul 2022 05:47  Phos  1.9     07-22  Mg     2.2     07-22    TPro  4.8<L>  /  Alb  2.4<L>  /  TBili  0.9  /  DBili  x   /  AST  17  /  ALT  14  /  AlkPhos  45  07-22      PT/INR - ( 20 Jul 2022 13:55 )   PT: 12.9 sec;   INR: 1.08 ratio         PTT - ( 20 Jul 2022 13:55 )  PTT:23.5 sec              BLOOD SMEAR INTERPRETATION:       RADIOLOGY & ADDITIONAL STUDIES:

## 2022-07-22 NOTE — PROGRESS NOTE ADULT - PROBLEM SELECTOR PLAN 2
New onset 2 weeks ago at [facility]  In the setting of profound anemia might be reactive  Patient HR has become controlled after bolus and blood transfusion  -PRN cardizem if bp allows  -Dr. Duval = Start Lopressor 25 mg PO BID  -f/u TTE results New onset 2 weeks ago at [facility]  In the setting of profound anemia might be reactive  Patient HR has become controlled after bolus and blood transfusion  -TTE 7/21: Marked posterior mitral annular calcification. Moderate to severe mitral regurgitation. Dilated HF.  -PRN cardizem if bp allows  -Dr. Duval = Start Lopressor 25 mg PO BID

## 2022-07-22 NOTE — PROGRESS NOTE ADULT - ASSESSMENT
Patient is a 76 yo female who lives at home with bedbound , and has 2 HHA, PMH of MDS initially in remission , now mutated, and complicated by severe anemia and thrombocytopenia requiring blood transfusions, last of which was yesterday, 2 U, for Hgb of 4, dc home stable,  who comes in complaining of Melena, described as dark diarrhea, following hard stools the day prior.Of note patient also had transfusion 2 weeks ago, at [facility] where she was found with hgb of 3. Patient was also found with one episode of A-fib RVR which resolved, as well as asymptomatic bacteriuria which was treated. Now presents with dark stools, lightheadedness, low appetite, not eaten since 4 dyas ago, nausea with dry heaves, no vomiting, denies fever, chills, changes in urine output.    Patient was admitted for GI Bleed. GI was consulted for further evaluation.

## 2022-07-22 NOTE — PROGRESS NOTE ADULT - SUBJECTIVE AND OBJECTIVE BOX
PGY-1 Progress Note discussed with attending    INTERVAL HPI/OVERNIGHT EVENTS:     Pt.'s hemoglobin dropped to 6.0, she was depleted with 1 u this morning over 3 hours. Pt. states that she feels fine, has not been having any diarrhea, only slight notice of blood in her stools. No abdominal pain, she just feels gassy. Pt. had an episode of high blood pressure overnight, labetolol given.     MEDICATIONS  (STANDING):  metoprolol tartrate 25 milliGRAM(s) Oral two times a day  pantoprazole  Injectable 40 milliGRAM(s) IV Push every 12 hours  potassium phosphate IVPB 15 milliMole(s) IV Intermittent once    MEDICATIONS  (PRN):      REVIEW OF SYSTEMS:  CONSTITUTIONAL: No fever, weight loss, or fatigue  RESPIRATORY: No cough, wheezing, chills or hemoptysis; No shortness of breath  CARDIOVASCULAR: No chest pain, palpitations, dizziness, or leg swelling  GASTROINTESTINAL: No abdominal pain. No nausea, vomiting, or hematemesis; No diarrhea or constipation. No melena or hematochezia.  GENITOURINARY: No dysuria or hematuria, urinary frequency  NEUROLOGICAL: No headaches, memory loss, loss of strength, numbness, or tremors  SKIN: No itching, burning, rashes, or lesions     Vital Signs Last 24 Hrs  T(C): 36.8 (22 Jul 2022 11:35), Max: 37.7 (21 Jul 2022 23:35)  T(F): 98.2 (22 Jul 2022 11:35), Max: 99.8 (21 Jul 2022 23:35)  HR: 80 (22 Jul 2022 11:35) (72 - 97)  BP: 139/46 (22 Jul 2022 11:35) (102/83 - 170/66)  BP(mean): 80 (22 Jul 2022 11:35) (70 - 88)  RR: 18 (22 Jul 2022 11:35) (18 - 18)  SpO2: 98% (22 Jul 2022 11:35) (96% - 99%)    Parameters below as of 22 Jul 2022 10:44  Patient On (Oxygen Delivery Method): room air        PHYSICAL EXAMINATION:  GENERAL: NAD, well built  HEAD:  Atraumatic, Normocephalic  EYES:  conjunctiva and sclera clear  NECK: Supple, No JVD, Normal thyroid  CHEST/LUNG: Clear to auscultation. Clear to percussion bilaterally; No rales, rhonchi, wheezing, or rubs  HEART: Regular rate and rhythm; No murmurs, rubs, or gallops  ABDOMEN: Soft, Nontender, Nondistended; Bowel sounds present  NERVOUS SYSTEM:  Alert & Oriented X3,    EXTREMITIES:  2+ Peripheral Pulses, No clubbing, cyanosis, or edema  SKIN: warm dry                          6.0    4.65  )-----------( 105      ( 22 Jul 2022 05:47 )             18.1     07-22    136  |  105  |  23<H>  ----------------------------<  85  3.9   |  26  |  0.78    Ca    8.2<L>      22 Jul 2022 05:47  Phos  1.9     07-22  Mg     2.2     07-22    TPro  4.8<L>  /  Alb  2.4<L>  /  TBili  0.9  /  DBili  x   /  AST  17  /  ALT  14  /  AlkPhos  45  07-22    LIVER FUNCTIONS - ( 22 Jul 2022 05:47 )  Alb: 2.4 g/dL / Pro: 4.8 g/dL / ALK PHOS: 45 U/L / ALT: 14 U/L DA / AST: 17 U/L / GGT: x                   CAPILLARY BLOOD GLUCOSE      RADIOLOGY & ADDITIONAL TESTS:                   PGY-1 Progress Note discussed with attending    INTERVAL HPI/OVERNIGHT EVENTS:     Pt.'s hemoglobin dropped to 6.0, she was depleted with 1 u this morning over 3 hours. Pt. states that she feels fine, has not been having any diarrhea, only slight notice of blood in her stools. No abdominal pain, she just feels gassy. Pt. had an episode of high blood pressure overnight, labetolol given.     MEDICATIONS  (STANDING):  metoprolol tartrate 25 milliGRAM(s) Oral two times a day  pantoprazole  Injectable 40 milliGRAM(s) IV Push every 12 hours  potassium phosphate IVPB 15 milliMole(s) IV Intermittent once    MEDICATIONS  (PRN):      REVIEW OF SYSTEMS:  CONSTITUTIONAL: No fever, weight loss, or fatigue  RESPIRATORY: No cough, wheezing, chills or hemoptysis; No shortness of breath  CARDIOVASCULAR: No chest pain, palpitations, dizziness, or leg swelling  GASTROINTESTINAL: No abdominal pain. No nausea, vomiting, or hematemesis; No diarrhea or constipation. No melena or hematochezia.  GENITOURINARY: No dysuria or hematuria, urinary frequency  NEUROLOGICAL: No headaches, memory loss, loss of strength, numbness, or tremors  SKIN: No itching, burning, rashes, or lesions     Vital Signs Last 24 Hrs  T(C): 36.8 (22 Jul 2022 11:35), Max: 37.7 (21 Jul 2022 23:35)  T(F): 98.2 (22 Jul 2022 11:35), Max: 99.8 (21 Jul 2022 23:35)  HR: 80 (22 Jul 2022 11:35) (72 - 97)  BP: 139/46 (22 Jul 2022 11:35) (102/83 - 170/66)  BP(mean): 80 (22 Jul 2022 11:35) (70 - 88)  RR: 18 (22 Jul 2022 11:35) (18 - 18)  SpO2: 98% (22 Jul 2022 11:35) (96% - 99%)    Parameters below as of 22 Jul 2022 10:44  Patient On (Oxygen Delivery Method): room air        PHYSICAL EXAMINATION:  GENERAL: NAD, well built  HEAD:  Atraumatic, Normocephalic  EYES:  conjunctiva and sclera clear  NECK: Supple, No JVD, Normal thyroid  CHEST/LUNG: Clear to auscultation. Clear to percussion bilaterally; No rales, rhonchi, wheezing, or rubs  HEART: Regular rate and rhythm; No murmurs, rubs, or gallops  ABDOMEN: Soft, Nontender, Nondistended; Bowel sounds present  NERVOUS SYSTEM:  Alert & Oriented X3,    EXTREMITIES:  2+ Peripheral Pulses, No clubbing, cyanosis, or edema  SKIN: warm dry                          6.0    4.65  )-----------( 105      ( 22 Jul 2022 05:47 )             18.1     07-22    136  |  105  |  23<H>  ----------------------------<  85  3.9   |  26  |  0.78    Ca    8.2<L>      22 Jul 2022 05:47  Phos  1.9     07-22  Mg     2.2     07-22    TPro  4.8<L>  /  Alb  2.4<L>  /  TBili  0.9  /  DBili  x   /  AST  17  /  ALT  14  /  AlkPhos  45  07-22    LIVER FUNCTIONS - ( 22 Jul 2022 05:47 )  Alb: 2.4 g/dL / Pro: 4.8 g/dL / ALK PHOS: 45 U/L / ALT: 14 U/L DA / AST: 17 U/L / GGT: x                   CAPILLARY BLOOD GLUCOSE      RADIOLOGY & ADDITIONAL TESTS:      TTE 7/21:  CONCLUSIONS:  1. Marked posterior mitral annular calcification. Moderate  to severe mitral regurgitation.  2. Calcified aortic valve not well visualized. Mild aortic  stenosis. No aortic valve regurgitation seen.  3. Moderately dilated left atrium.  4. Normal left ventricular internal dimensions and wall  thicknesses.  5. Hyperdynamic left ventricular systolic function (EF  >70%).  6. Grade I diastolic dysfunction (Impaired relaxation,  mild).  7. Normal right ventricular size and systolic function  (TAPSE 1.9 cm).  8. No pericardial effusion.

## 2022-07-22 NOTE — PROGRESS NOTE ADULT - PROBLEM SELECTOR PLAN 4
Hold BP meds  BP is soft but controlled  Maintain rate control Episode of high BP over night (systolic 180s), labetolol given

## 2022-07-23 LAB
ALBUMIN SERPL ELPH-MCNC: 2.5 G/DL — LOW (ref 3.5–5)
ALP SERPL-CCNC: 50 U/L — SIGNIFICANT CHANGE UP (ref 40–120)
ALT FLD-CCNC: 15 U/L DA — SIGNIFICANT CHANGE UP (ref 10–60)
ANION GAP SERPL CALC-SCNC: 8 MMOL/L — SIGNIFICANT CHANGE UP (ref 5–17)
AST SERPL-CCNC: 17 U/L — SIGNIFICANT CHANGE UP (ref 10–40)
BASOPHILS # BLD AUTO: 0.02 K/UL — SIGNIFICANT CHANGE UP (ref 0–0.2)
BASOPHILS NFR BLD AUTO: 0.5 % — SIGNIFICANT CHANGE UP (ref 0–2)
BILIRUB SERPL-MCNC: 0.9 MG/DL — SIGNIFICANT CHANGE UP (ref 0.2–1.2)
BUN SERPL-MCNC: 19 MG/DL — HIGH (ref 7–18)
CALCIUM SERPL-MCNC: 8.2 MG/DL — LOW (ref 8.4–10.5)
CHLORIDE SERPL-SCNC: 105 MMOL/L — SIGNIFICANT CHANGE UP (ref 96–108)
CO2 SERPL-SCNC: 25 MMOL/L — SIGNIFICANT CHANGE UP (ref 22–31)
CREAT SERPL-MCNC: 0.73 MG/DL — SIGNIFICANT CHANGE UP (ref 0.5–1.3)
EGFR: 85 ML/MIN/1.73M2 — SIGNIFICANT CHANGE UP
EOSINOPHIL # BLD AUTO: 0.02 K/UL — SIGNIFICANT CHANGE UP (ref 0–0.5)
EOSINOPHIL NFR BLD AUTO: 0.5 % — SIGNIFICANT CHANGE UP (ref 0–6)
GLUCOSE SERPL-MCNC: 83 MG/DL — SIGNIFICANT CHANGE UP (ref 70–99)
HCT VFR BLD CALC: 22.4 % — LOW (ref 34.5–45)
HCT VFR BLD CALC: 22.8 % — LOW (ref 34.5–45)
HCT VFR BLD CALC: 23 % — LOW (ref 34.5–45)
HGB BLD-MCNC: 7.5 G/DL — LOW (ref 11.5–15.5)
HGB BLD-MCNC: 7.5 G/DL — LOW (ref 11.5–15.5)
HGB BLD-MCNC: 7.6 G/DL — LOW (ref 11.5–15.5)
IMM GRANULOCYTES NFR BLD AUTO: 2.5 % — HIGH (ref 0–1.5)
LYMPHOCYTES # BLD AUTO: 1.11 K/UL — SIGNIFICANT CHANGE UP (ref 1–3.3)
LYMPHOCYTES # BLD AUTO: 25.3 % — SIGNIFICANT CHANGE UP (ref 13–44)
MAGNESIUM SERPL-MCNC: 2.5 MG/DL — SIGNIFICANT CHANGE UP (ref 1.6–2.6)
MCHC RBC-ENTMCNC: 30.8 PG — SIGNIFICANT CHANGE UP (ref 27–34)
MCHC RBC-ENTMCNC: 31.3 PG — SIGNIFICANT CHANGE UP (ref 27–34)
MCHC RBC-ENTMCNC: 31.3 PG — SIGNIFICANT CHANGE UP (ref 27–34)
MCHC RBC-ENTMCNC: 32.9 GM/DL — SIGNIFICANT CHANGE UP (ref 32–36)
MCHC RBC-ENTMCNC: 33 GM/DL — SIGNIFICANT CHANGE UP (ref 32–36)
MCHC RBC-ENTMCNC: 33.5 GM/DL — SIGNIFICANT CHANGE UP (ref 32–36)
MCV RBC AUTO: 93.1 FL — SIGNIFICANT CHANGE UP (ref 80–100)
MCV RBC AUTO: 93.3 FL — SIGNIFICANT CHANGE UP (ref 80–100)
MCV RBC AUTO: 95 FL — SIGNIFICANT CHANGE UP (ref 80–100)
MONOCYTES # BLD AUTO: 0.24 K/UL — SIGNIFICANT CHANGE UP (ref 0–0.9)
MONOCYTES NFR BLD AUTO: 5.5 % — SIGNIFICANT CHANGE UP (ref 2–14)
NEUTROPHILS # BLD AUTO: 2.89 K/UL — SIGNIFICANT CHANGE UP (ref 1.8–7.4)
NEUTROPHILS NFR BLD AUTO: 65.7 % — SIGNIFICANT CHANGE UP (ref 43–77)
NRBC # BLD: 0 /100 WBCS — SIGNIFICANT CHANGE UP (ref 0–0)
NRBC # BLD: 0 /100 WBCS — SIGNIFICANT CHANGE UP (ref 0–0)
NRBC # BLD: 1 /100 WBCS — HIGH (ref 0–0)
PHOSPHATE SERPL-MCNC: 3.5 MG/DL — SIGNIFICANT CHANGE UP (ref 2.5–4.5)
PLATELET # BLD AUTO: 77 K/UL — LOW (ref 150–400)
PLATELET # BLD AUTO: 81 K/UL — LOW (ref 150–400)
PLATELET # BLD AUTO: 88 K/UL — LOW (ref 150–400)
POTASSIUM SERPL-MCNC: 3.8 MMOL/L — SIGNIFICANT CHANGE UP (ref 3.5–5.3)
POTASSIUM SERPL-SCNC: 3.8 MMOL/L — SIGNIFICANT CHANGE UP (ref 3.5–5.3)
PROT SERPL-MCNC: 5 G/DL — LOW (ref 6–8.3)
RBC # BLD: 2.4 M/UL — LOW (ref 3.8–5.2)
RBC # BLD: 2.4 M/UL — LOW (ref 3.8–5.2)
RBC # BLD: 2.47 M/UL — LOW (ref 3.8–5.2)
RBC # FLD: 16.2 % — HIGH (ref 10.3–14.5)
RBC # FLD: 16.3 % — HIGH (ref 10.3–14.5)
RBC # FLD: 16.9 % — HIGH (ref 10.3–14.5)
SODIUM SERPL-SCNC: 138 MMOL/L — SIGNIFICANT CHANGE UP (ref 135–145)
WBC # BLD: 4.39 K/UL — SIGNIFICANT CHANGE UP (ref 3.8–10.5)
WBC # BLD: 4.71 K/UL — SIGNIFICANT CHANGE UP (ref 3.8–10.5)
WBC # BLD: 5.66 K/UL — SIGNIFICANT CHANGE UP (ref 3.8–10.5)
WBC # FLD AUTO: 4.39 K/UL — SIGNIFICANT CHANGE UP (ref 3.8–10.5)
WBC # FLD AUTO: 4.71 K/UL — SIGNIFICANT CHANGE UP (ref 3.8–10.5)
WBC # FLD AUTO: 5.66 K/UL — SIGNIFICANT CHANGE UP (ref 3.8–10.5)

## 2022-07-23 PROCEDURE — 74018 RADEX ABDOMEN 1 VIEW: CPT | Mod: 26

## 2022-07-23 RX ORDER — PHENYLEPHRINE-SHARK LIVER OIL-MINERAL OIL-PETROLATUM RECTAL OINTMENT
1 OINTMENT (GRAM) RECTAL
Refills: 0 | Status: DISCONTINUED | OUTPATIENT
Start: 2022-07-23 | End: 2022-07-25

## 2022-07-23 RX ADMIN — PANTOPRAZOLE SODIUM 40 MILLIGRAM(S): 20 TABLET, DELAYED RELEASE ORAL at 05:39

## 2022-07-23 RX ADMIN — PANTOPRAZOLE SODIUM 40 MILLIGRAM(S): 20 TABLET, DELAYED RELEASE ORAL at 18:24

## 2022-07-23 RX ADMIN — Medication 25 MILLIGRAM(S): at 05:38

## 2022-07-23 RX ADMIN — PHENYLEPHRINE-SHARK LIVER OIL-MINERAL OIL-PETROLATUM RECTAL OINTMENT 1 APPLICATION(S): at 18:43

## 2022-07-23 RX ADMIN — PHENYLEPHRINE-SHARK LIVER OIL-MINERAL OIL-PETROLATUM RECTAL OINTMENT 1 APPLICATION(S): at 11:52

## 2022-07-23 RX ADMIN — Medication 25 MILLIGRAM(S): at 18:24

## 2022-07-23 NOTE — PROGRESS NOTE ADULT - PROBLEM SELECTOR PLAN 1
Patient comes in with dark stools in the setting of Myelodysplastic syndrome, had transfusion the day prior to admission  -CT angio 7/19/22 = no signs of acute GI bleed, was tranfused 1u in addition to the 2 from the [facility] prior  -also received platelets as they were at 12  - platelets  were 100s now 70s and down trending per MDS  -CT ango as above,   -Had a colonoscopy/endo April, 2022 at [facility] (last admission to the hospital)   -with PRBPR patient might be bleeding because of hemorrhoids- colonoscopy done in 2022 suggested  s/p 1u prbc last night, hgb stable at 7.5  -c/w IV PPI BID  -CBC am  -GI = no indication to scope, transfuse as needed

## 2022-07-23 NOTE — PROGRESS NOTE ADULT - PROBLEM SELECTOR PLAN 2
New onset 2 weeks ago at [facility]  In the setting of profound anemia might be reactive  Patient HR has become controlled after bolus and blood transfusion  -TTE 7/21: Marked posterior mitral annular calcification. Moderate to severe mitral regurgitation. Dilated HF.  -PRN cardizem if bp allows  -Dr. Duval = on Lopressor 25 mg PO BID: HR controlled  avoiding ac due to risk of bleeding

## 2022-07-23 NOTE — PROGRESS NOTE ADULT - PROBLEM SELECTOR PLAN 4
Episode of high BP over night (systolic 180s), labetolol given  still hypertensive   will monitor for now due to recent bleeding concerns and hypotension on admission

## 2022-07-23 NOTE — PROGRESS NOTE ADULT - SUBJECTIVE AND OBJECTIVE BOX
PGY-1 Progress Note discussed with attending    PAGER #: [[phone]] TILL 5:00 PM  PLEASE CONTACT ON CALL TEAM:  - On Call Team (Please refer to Katharine) FROM 5:00 PM - 8:30PM  - Nightfloat Team FROM 8:30 -7:30 AM    CHIEF COMPLAINT & BRIEF HOSPITAL COURSE:  Patient is a 78 yo female who lives at home with bedbound , and has 2 HHA, PMH of MDS initially in remission , now mutated, and complicated by severe anemia and thrombocytopenia requiring blood transfusions, last of which was yesterday, 2 U, for Hgb of 4, dc home stable,  who comes in complaining of Melena, described as dark diarrhea, following hard stools the day prior found to have BP 80/50 EKG showing A-fib and , Hgb of 6.5, K 3, BNP 5K,  occult blood was +Ve s/p CODE fusion was called and 2U of blood was given and a liter bolus, K repalced, HR self convered, lasix and CXR ordered  due to s/o volume overload  and patient was admitted for GI bleed and monitoring for A fib RVR    s/p cardizem,  for AFib  For bleed s/p 4U in 2 days, 1plt 1 ffp    INTERVAL HPI/OVERNIGHT EVENTS:   overnight 7/23  PRBPR, s/p mag citrate for constipation seen on CT  no weakness, no other complaints  known hx of hemorrhoids      MEDICATIONS  (STANDING):  hemorrhoidal Ointment 1 Application(s) Rectal two times a day  metoprolol tartrate 25 milliGRAM(s) Oral two times a day  pantoprazole  Injectable 40 milliGRAM(s) IV Push every 12 hours    MEDICATIONS  (PRN):      REVIEW OF SYSTEMS:  CONSTITUTIONAL: No fever, weight loss, or fatigue  RESPIRATORY: No cough, wheezing, chills or hemoptysis; No shortness of breath  CARDIOVASCULAR: No chest pain, palpitations, dizziness, or leg swelling  GASTROINTESTINAL: No abdominal pain. No nausea, vomiting, or hematemesis; No diarrhea or constipation. + hematochezia  GENITOURINARY: No dysuria or hematuria, urinary frequency  NEUROLOGICAL: No headaches, memory loss, loss of strength, numbness, or tremors  SKIN: No itching, burning, rashes, or lesions     Vital Signs Last 24 Hrs  T(C): 37.1 (23 Jul 2022 15:59), Max: 37.1 (23 Jul 2022 15:59)  T(F): 98.8 (23 Jul 2022 15:59), Max: 98.8 (23 Jul 2022 15:59)  HR: 78 (23 Jul 2022 15:59) (66 - 85)  BP: 160/51 (23 Jul 2022 15:59) (111/39 - 167/50)  BP(mean): --  RR: 18 (23 Jul 2022 15:59) (18 - 19)  SpO2: 98% (23 Jul 2022 15:59) (95% - 100%)    Parameters below as of 23 Jul 2022 15:59  Patient On (Oxygen Delivery Method): room air        PHYSICAL EXAMINATION:  GENERAL: NAD, well built  HEAD:  Atraumatic, Normocephalic  EYES:  conjunctiva and sclera clear  NECK: Supple, No JVD  CHEST/LUNG: Clear to auscultation. Clear to percussion bilaterally; No rales, rhonchi, wheezing, or rubs  HEART: Regular rate and rhythm; No murmurs, rubs, or gallops  ABDOMEN: Soft, Nontender, Nondistended; Bowel sounds present  NERVOUS SYSTEM:  Alert & Oriented X3,    EXTREMITIES:  2+ Peripheral Pulses, No clubbing, cyanosis, or edema  SKIN: warm dry                          7.5    4.39  )-----------( 78       ( 23 Jul 2022 12:52 )             22.8     07-23    138  |  105  |  19<H>  ----------------------------<  83  3.8   |  25  |  0.73    Ca    8.2<L>      23 Jul 2022 05:00  Phos  3.5     07-23  Mg     2.5     07-23    TPro  5.0<L>  /  Alb  2.5<L>  /  TBili  0.9  /  DBili  x   /  AST  17  /  ALT  15  /  AlkPhos  50  07-23    LIVER FUNCTIONS - ( 23 Jul 2022 05:00 )  Alb: 2.5 g/dL / Pro: 5.0 g/dL / ALK PHOS: 50 U/L / ALT: 15 U/L DA / AST: 17 U/L / GGT: x                   CAPILLARY BLOOD GLUCOSE      RADIOLOGY & ADDITIONAL TESTS:

## 2022-07-23 NOTE — PROGRESS NOTE ADULT - ASSESSMENT
Patient is a 76 yo female who lives at home with bedbound , and has 2 HHA, PMH of MDS initially in remission , now mutated, and complicated by severe anemia and thrombocytopenia requiring blood transfusions, last of which was yesterday, 2 U, for Hgb of 4, dc home stable,  who comes in complaining of Melena, described as dark diarrhea, following hard stools the day prior found to have BP 80/50 EKG showing A-fib and , Hgb of 6.5, K 3, BNP 5K,  occult blood was +Ve s/p CODE fusion was called and 2U of blood was given and a liter bolus, K repalced, HR self convered, lasix and CXR ordered  due to s/o volume overload and patient was admitted for GI bleed and monitoring for A fib RVR, now with PRBPR overnight

## 2022-07-23 NOTE — PROGRESS NOTE ADULT - SUBJECTIVE AND OBJECTIVE BOX
Patient is a 77y old  Female who presents with a chief complaint of Melena (2022 14:18)    PATIENT IS SEEN AND EXAMINED IN MEDICAL FLOOR.  NGT [    ]    BELINDA [   ]      GT [   ]    ALLERGIES:  penicillin (Hives)      Daily     Daily Weight in k.6 (2022 04:30)    VITALS:    Vital Signs Last 24 Hrs  T(C): 36.8 (2022 07:32), Max: 36.8 (2022 11:35)  T(F): 98.2 (2022 07:32), Max: 98.3 (2022 23:39)  HR: 73 (2022 07:32) (67 - 87)  BP: 150/44 (2022 07:32) (114/54 - 167/50)  BP(mean): 80 (2022 11:35) (80 - 80)  RR: 19 (2022 07:32) (18 - 19)  SpO2: 100% (2022 07:32) (97% - 100%)    Parameters below as of 2022 07:32  Patient On (Oxygen Delivery Method): room air        LABS:    CBC Full  -  ( 2022 05:00 )  WBC Count : 4.71 K/uL  RBC Count : 2.47 M/uL  Hemoglobin : 7.6 g/dL  Hematocrit : 23.0 %  Platelet Count - Automated : 81 K/uL  Mean Cell Volume : 93.1 fl  Mean Cell Hemoglobin : 30.8 pg  Mean Cell Hemoglobin Concentration : 33.0 gm/dL  Auto Neutrophil # : x  Auto Lymphocyte # : x  Auto Monocyte # : x  Auto Eosinophil # : x  Auto Basophil # : x  Auto Neutrophil % : x  Auto Lymphocyte % : x  Auto Monocyte % : x  Auto Eosinophil % : x  Auto Basophil % : x          138  |  105  |  19<H>  ----------------------------<  83  3.8   |  25  |  0.73    Ca    8.2<L>      2022 05:00  Phos  3.5     07-  Mg     2.5     07-23    TPro  5.0<L>  /  Alb  2.5<L>  /  TBili  0.9  /  DBili  x   /  AST  17  /  ALT  15  /  AlkPhos  50  07-23    CAPILLARY BLOOD GLUCOSE            LIVER FUNCTIONS - ( 2022 05:00 )  Alb: 2.5 g/dL / Pro: 5.0 g/dL / ALK PHOS: 50 U/L / ALT: 15 U/L DA / AST: 17 U/L / GGT: x           Creatinine Trend: 0.73<--, 1.01<--, 0.78<--, 0.69<--, 0.73<--, 0.85<--  I&O's Summary    2022 07:01  -  2022 07:00  --------------------------------------------------------  IN: 811.5 mL / OUT: 0 mL / NET: 811.5 mL            Clean Catch Clean Catch (Midstream)  -03 @ 21:17   >100,000 CFU/ml Escherichia coli ESBL  <10,000 CFU/ml Normal Urogenital marely present  --  Escherichia coli ESBL      .Blood Blood  - @ 03:49   No Growth Final  --  --          MEDICATIONS:    MEDICATIONS  (STANDING):  hemorrhoidal Ointment 1 Application(s) Rectal two times a day  metoprolol tartrate 25 milliGRAM(s) Oral two times a day  pantoprazole  Injectable 40 milliGRAM(s) IV Push every 12 hours      MEDICATIONS  (PRN):      REVIEW OF SYSTEMS:                           ALL ROS DONE [ X   ]    CONSTITUTIONAL:  LETHARGIC [   ], FEVER [   ], UNRESPONSIVE [   ]  CVS:  CP  [   ], SOB, [   ], PALPITATIONS [   ], DIZZYNESS [   ]  RS: COUGH [   ], SPUTUM [   ]  GI: ABDOMINAL PAIN [   ], NAUSEA [   ], VOMITINGS [   ], DIARRHEA [   ], CONSTIPATION [   ]  :  DYSURIA [   ], NOCTURIA [   ], INCREASED FREQUENCY [   ], DRIBLING [   ],  SKELETAL: PAINFUL JOINTS [   ], SWOLLEN JOINTS [   ], NECK ACHE [   ], LOW BACK ACHE [   ],  SKIN : ULCERS [   ], RASH [   ], ITCHING [   ]  CNS: HEAD ACHE [   ], DOUBLE VISION [   ], BLURRED VISION [   ], AMS / CONFUSION [   ], SEIZURES [   ], WEAKNESS [   ],TINGLING / NUMBNESS [   ]    PHYSICAL EXAMINATION:  GENERAL APPEARANCE: NO DISTRESS  HEENT:  NO PALLOR, NO  JVD,  NO   NODES, NECK SUPPLE  CVS: S1 +, S2 +,   RS: AEEB,  OCCASIONAL  RALES +,   NO RONCHI  ABD: SOFT, NT, NO, BS +  EXT: NO PE  SKIN: WARM,   SKELETAL:  ROM ACCEPTABLE  CNS:  AAO X    ,   DEFICITS    RADIOLOGY :      ASSESSMENT :     Hypotension    Yes    Hypertension    Anemia    Myelodysplastic syndrome    Pancytopenia    No significant past surgical history        PLAN:  HPI:  Patient is a 76 yo female who lives at home with bedbound , and has 2 HHA, PMH of MDS initially in remission , now mutated, and complicated by severe anemia and thrombocytopenia requiring blood transfusions, last of which was yesterday, 2 U, for Hgb of 4, dc home stable,  who comes in complaining of Melena, described as dark diarrhea, following hard stools the day prior.    OF note patient also had transfusion 2 weeks ago, at [facility] where she was found with hgb of 3. Patient was also found with one episode of A-fib RVR which resolved, as well as asymptomatic bacteriuria which was treated.    Now presents with dark stools, lightheadedness, low appetite, not eaten since 4 dyas ago, nausea with dry heaves, no vomiting, denies fever, chills, changes in urine output.    In the ED patient was AF, and sats 98% on RA, however  BP 80/50 EKG showing A-fib and   Exam: notable for cachectic pleasant female, with no distress, pale skin, but benign exam overall  Labs: showed Hgb of 6.5, K 3, BNP 5K, COVID +Ve (not new) occult blood was +Ve    CODE fusion was called and 2U of blood was given and a liter bolus  For the A fib RVR diltiazem was given at 5mg   K was replaced  lasix and CXR ordered  due to s/o volume overload  and patient was admitted for GI bleed       (2022 17:32)      # CASE D/W DAUGHTER HOLLY CARNEY VIA PHONE @ [phone] []; CONVEYED THAT PROGNOSIS IS GUARDED, PATIENT AND FAMILY VERBALIZED UNDERSTANDING    # R/O GI BLEED  # NORMOCYTIC ANEMIA, MDS  # THROMBOCYTOPENIA   # CHRONIC CONSTIPATION, HX OF HEMORRHOID  - GIVEN 2 UNITS PRBC, 2 UNITS PLATELETS AND 1 UNIT FFP; PATIENT WAS ALSO TRANSFUSED 22   - F/U POST TRANSFUSION CBC, MONITOR SYMPTOMS CLOSELY DURING TRANSFUSION  - TREND HGB, PPI IV BID  - NOTED CT A/P  - TRANSFUSION THRESHOLD HGB < 7  - TRANSFUSION THRESHOLD FOR PLT < 10 [IF NO BLEEDING], < 50 [IF BLEEDING]  - GASTROENTEROLOGY CONSULT  - HEME/ONC CONSULT    - EGD/COLONOSCOPY IN  AND ?CAPSULE ENDOSCOPY - WILL OBTAIN RECORDS    - PLANNED FOR PRBC TRANSFUSION  ; D/W GI AND HEME/ONC  - NONTENDER EXAM OF ABDOMEN  - WILL MONITOR CLOSELY FOR MELENA/HEMATOCHEZIA/HEMATEMESIS    - LOW THRESHOLD FOR CRITICAL CARE CONSULT, D/W RESIDENT TEAM    # AFIB W/ RVR - IMPROVED  - PRN RATE CONTROL  - MONITOR ON TELEMETRY  - F/U ECHOCARDIOGRAM  - HOLD A/C GIVEN CONCERN FOR BLEEDING; DISCUSSED WITH PATIENT AND WITH DAUGHTER REGARDING BLEEDING RISK VS. STROKE RISK. BOTH VERBALIZED UNDERSTANDING   - STARTED ON METOPROLOL PER CARDIOLOGY  - CARDIOLOGY CONSULT    # COVID19 INFECTION  - PATIENT IS S/P ? MEDICATION [ SUSPECT PAXLOVID PER DAUGHTER ]  - MONITOR PO2, MONITOR INFLAMMATORY MARKERS  - MAY DEFER CONTACT AND DROPLET ISOLATION PRECAUTIONS  -- PER FAMILY PATIENT TESTED POSITIVE ON APPROXIMATELY 2022 AT [facility] -- AWAITING PAPERWORK FROM FAMILY. DISCUSSED WITH INFECTION CONTROL TEAM AT [facility].     # SUSPECT MODERATE PROTEIN CALORIE MALNUTRITION  - NUTRITIONAL SUPPLEMENTATION    # HYPOKALEMIA  - REPLETE WITH SUPPLEMENT  - MONITORING CLOSELY    # GI PPX; AT [facility].    Patient is a 77y old  Female who presents with a chief complaint of Melena (2022 14:18)    PATIENT IS SEEN AND EXAMINED IN MEDICAL FLOOR.    ALLERGIES:  penicillin (Hives)      Daily     Daily Weight in k.6 (2022 04:30)    VITALS:    Vital Signs Last 24 Hrs  T(C): 36.8 (2022 07:32), Max: 36.8 (2022 11:35)  T(F): 98.2 (2022 07:32), Max: 98.3 (2022 23:39)  HR: 73 (2022 07:32) (67 - 87)  BP: 150/44 (2022 07:32) (114/54 - 167/50)  BP(mean): 80 (2022 11:35) (80 - 80)  RR: 19 (2022 07:32) (18 - 19)  SpO2: 100% (2022 07:32) (97% - 100%)    Parameters below as of 2022 07:32  Patient On (Oxygen Delivery Method): room air        LABS:    CBC Full  -  ( 2022 05:00 )  WBC Count : 4.71 K/uL  RBC Count : 2.47 M/uL  Hemoglobin : 7.6 g/dL  Hematocrit : 23.0 %  Platelet Count - Automated : 81 K/uL  Mean Cell Volume : 93.1 fl  Mean Cell Hemoglobin : 30.8 pg  Mean Cell Hemoglobin Concentration : 33.0 gm/dL  Auto Neutrophil # : x  Auto Lymphocyte # : x  Auto Monocyte # : x  Auto Eosinophil # : x  Auto Basophil # : x  Auto Neutrophil % : x  Auto Lymphocyte % : x  Auto Monocyte % : x  Auto Eosinophil % : x  Auto Basophil % : x          138  |  105  |  19<H>  ----------------------------<  83  3.8   |  25  |  0.73    Ca    8.2<L>      2022 05:00  Phos  3.5     07-23  Mg     2.5         TPro  5.0<L>  /  Alb  2.5<L>  /  TBili  0.9  /  DBili  x   /  AST  17  /  ALT  15  /  AlkPhos  50      CAPILLARY BLOOD GLUCOSE            LIVER FUNCTIONS - ( 2022 05:00 )  Alb: 2.5 g/dL / Pro: 5.0 g/dL / ALK PHOS: 50 U/L / ALT: 15 U/L DA / AST: 17 U/L / GGT: x           Creatinine Trend: 0.73<--, 1.01<--, 0.78<--, 0.69<--, 0.73<--, 0.85<--  I&O's Summary    2022 07:01  -  2022 07:00  --------------------------------------------------------  IN: 811.5 mL / OUT: 0 mL / NET: 811.5 mL            Clean Catch Clean Catch (Midstream)  06-03 @ 21:17   >100,000 CFU/ml Escherichia coli ESBL  <10,000 CFU/ml Normal Urogenital marely present  --  Escherichia coli ESBL      .Blood Blood  - @ 03:49   No Growth Final  --  --          MEDICATIONS:    MEDICATIONS  (STANDING):  hemorrhoidal Ointment 1 Application(s) Rectal two times a day  metoprolol tartrate 25 milliGRAM(s) Oral two times a day  pantoprazole  Injectable 40 milliGRAM(s) IV Push every 12 hours      MEDICATIONS  (PRN):      REVIEW OF SYSTEMS:                           ALL ROS DONE [ X   ]    CONSTITUTIONAL:  LETHARGIC [   ], FEVER [   ], UNRESPONSIVE [   ]  CVS:  CP  [   ], SOB, [   ], PALPITATIONS [   ], DIZZYNESS [   ]  RS: COUGH [   ], SPUTUM [   ]  GI: ABDOMINAL PAIN [   ], NAUSEA [   ], VOMITINGS [   ], DIARRHEA [   ], CONSTIPATION [   ]  :  DYSURIA [   ], NOCTURIA [   ], INCREASED FREQUENCY [   ], DRIBLING [   ],  SKELETAL: PAINFUL JOINTS [   ], SWOLLEN JOINTS [   ], NECK ACHE [   ], LOW BACK ACHE [   ],  SKIN : ULCERS [   ], RASH [   ], ITCHING [   ]  CNS: HEAD ACHE [   ], DOUBLE VISION [   ], BLURRED VISION [   ], AMS / CONFUSION [   ], SEIZURES [   ], WEAKNESS [   ],TINGLING / NUMBNESS [   ]    PHYSICAL EXAMINATION:  GENERAL APPEARANCE: NO DISTRESS  HEENT:  NO PALLOR, NO  JVD,  NO   NODES, NECK SUPPLE  CVS: S1 +, S2 +,   RS: AEEB,  OCCASIONAL  RALES +,   NO RONCHI  ABD: SOFT, NT, NO, BS +  EXT: NO PE  SKIN: WARM,   SKELETAL:  ROM ACCEPTABLE  CNS:  AAO X    ,   DEFICITS    RADIOLOGY :      ASSESSMENT :     Hypotension    Yes    Hypertension    Anemia    Myelodysplastic syndrome    Pancytopenia    No significant past surgical history        PLAN:  HPI:  Patient is a 76 yo female who lives at home with bedbound , and has 2 HHA, PMH of MDS initially in remission , now mutated, and complicated by severe anemia and thrombocytopenia requiring blood transfusions, last of which was yesterday, 2 U, for Hgb of 4, dc home stable,  who comes in complaining of Melena, described as dark diarrhea, following hard stools the day prior.    OF note patient also had transfusion 2 weeks ago, at [facility] where she was found with hgb of 3. Patient was also found with one episode of A-fib RVR which resolved, as well as asymptomatic bacteriuria which was treated.    Now presents with dark stools, lightheadedness, low appetite, not eaten since 4 dyas ago, nausea with dry heaves, no vomiting, denies fever, chills, changes in urine output.    In the ED patient was AF, and sats 98% on RA, however  BP 80/50 EKG showing A-fib and   Exam: notable for cachectic pleasant female, with no distress, pale skin, but benign exam overall  Labs: showed Hgb of 6.5, K 3, BNP 5K, COVID +Ve (not new) occult blood was +Ve    CODE fusion was called and 2U of blood was given and a liter bolus  For the A fib RVR diltiazem was given at 5mg   K was replaced  lasix and CXR ordered  due to s/o volume overload  and patient was admitted for GI bleed       (2022 17:32)      # CASE D/W DAUGHTER HOLLY CARNEY VIA PHONE @ [phone] []; CONVEYED THAT PROGNOSIS IS GUARDED, PATIENT AND FAMILY VERBALIZED UNDERSTANDING    # ATTEMPTED TO CALL DAUGHTER HOLLY CARNEY VIA PHONE @   X 3 TIMES - UNABLE TO GET THROUGH; CASE DISCUSSED AT LENGTH WITH PATIENT    # R/O GI BLEED  # NORMOCYTIC ANEMIA, MDS  # THROMBOCYTOPENIA   # CHRONIC CONSTIPATION, HX OF HEMORRHOID  - GIVEN 2 UNITS PRBC, 2 UNITS PLATELETS AND 1 UNIT FFP; PATIENT WAS ALSO TRANSFUSED 22   - F/U POST TRANSFUSION CBC, MONITOR SYMPTOMS CLOSELY DURING TRANSFUSION  - TREND HGB, PPI IV BID  - NOTED CT A/P  - TRANSFUSION THRESHOLD HGB < 7  - TRANSFUSION THRESHOLD FOR PLT < 10 [IF NO BLEEDING], < 50 [IF BLEEDING]  - GASTROENTEROLOGY CONSULT  - HEME/ONC CONSULT    - EGD/COLONOSCOPY IN  AND ?CAPSULE ENDOSCOPY - WILL OBTAIN RECORDS    - PLANNED FOR PRBC TRANSFUSION  ; D/W GI AND HEME/ONC  - NONTENDER EXAM OF ABDOMEN  - WILL MONITOR CLOSELY FOR MELENA/HEMATOCHEZIA/HEMATEMESIS    - LOW THRESHOLD FOR CRITICAL CARE CONSULT, D/W RESIDENT TEAM    - PATIENT WAS GIVEN MAG. CITRATE FOR CONSTIPATION - SHE HAD PAINLESS BRBPR OVERNIGHT, SHE EXPRESSED THAT SUBSEQUENTLY SHE PASSED  SOFT, BROWN STOOL W/ OUT ANY OVERT BLEEDING. HER HGB HAS BEEN TRENDED. PATIENT AND STAFF COUNSELLED TO MONITOR FOR BLEEDING.   - ORDERED HEMORRHOID CREAM      # AFIB W/ RVR - IMPROVED  - PRN RATE CONTROL  - MONITOR ON TELEMETRY  - F/U ECHOCARDIOGRAM  - HOLD A/C GIVEN CONCERN FOR BLEEDING; DISCUSSED WITH PATIENT AND WITH DAUGHTER REGARDING BLEEDING RISK VS. STROKE RISK. BOTH VERBALIZED UNDERSTANDING   - STARTED ON METOPROLOL PER CARDIOLOGY  - CARDIOLOGY CONSULT    # COVID19 INFECTION  - PATIENT IS S/P ? MEDICATION [ SUSPECT PAXLOVID PER DAUGHTER ]  - MONITOR PO2, MONITOR INFLAMMATORY MARKERS  - MAY DEFER CONTACT AND DROPLET ISOLATION PRECAUTIONS  -- PER FAMILY PATIENT TESTED POSITIVE ON APPROXIMATELY 2022 AT [facility] -- AWAITING PAPERWORK FROM FAMILY. DISCUSSED WITH INFECTION CONTROL TEAM AT [place].     # SUSPECT MODERATE PROTEIN CALORIE MALNUTRITION  - NUTRITIONAL SUPPLEMENTATION    # HYPOKALEMIA  - REPLETE WITH SUPPLEMENT  - MONITORING CLOSELY    # GI PPX; AT [facility].

## 2022-07-23 NOTE — PROGRESS NOTE ADULT - SUBJECTIVE AND OBJECTIVE BOX
C A R D I O L O G Y  **********************************     DATE OF SERVICE: 07-23-22    Patient denies chest pain or shortness of breath.   Review of symptoms otherwise negative.    hemorrhoidal Ointment 1 Application(s) Rectal two times a day  metoprolol tartrate 25 milliGRAM(s) Oral two times a day  pantoprazole  Injectable 40 milliGRAM(s) IV Push every 12 hours                            7.5    4.39  )-----------( 78       ( 23 Jul 2022 12:52 )             22.8       Hemoglobin: 7.5 g/dL (07-23 @ 12:52)  Hemoglobin: 7.6 g/dL (07-23 @ 05:00)  Hemoglobin: 7.5 g/dL (07-23 @ 01:36)  Hemoglobin: 8.3 g/dL (07-22 @ 21:05)  Hemoglobin: 7.5 g/dL (07-22 @ 13:57)      07-23    138  |  105  |  19<H>  ----------------------------<  83  3.8   |  25  |  0.73    Ca    8.2<L>      23 Jul 2022 05:00  Phos  3.5     07-23  Mg     2.5     07-23    TPro  5.0<L>  /  Alb  2.5<L>  /  TBili  0.9  /  DBili  x   /  AST  17  /  ALT  15  /  AlkPhos  50  07-23    Creatinine Trend: 0.73<--, 1.01<--, 0.78<--, 0.69<--, 0.73<--, 0.85<--    COAGS:           T(C): 36.7 (07-23-22 @ 11:17), Max: 36.8 (07-22-22 @ 23:39)  HR: 66 (07-23-22 @ 11:17) (66 - 87)  BP: 111/39 (07-23-22 @ 11:17) (111/39 - 167/50)  RR: 19 (07-23-22 @ 11:17) (18 - 19)  SpO2: 95% (07-23-22 @ 11:17) (95% - 100%)  Wt(kg): --    I&O's Summary    22 Jul 2022 07:01  -  23 Jul 2022 07:00  --------------------------------------------------------  IN: 811.5 mL / OUT: 0 mL / NET: 811.5 mL        Gen: Appears well in NAD  HEENT:  (-)icterus (-)pallor  CV: N S1 S2 1/6 KECIA (+)2 Pulses B/l  Resp:  Clear to ausculatation B/L, normal effort  GI: (+) BS Soft, NT, ND  Lymph:  (-)Edema, (-)obvious lymphadenopathy  Skin: Warm to touch, Normal turgor  Psych: Appropriate mood and affect      TELEMETRY: 	  sinus        ASSESSMENT/PLAN: 	77y  Female  who lives at home with bedbound , and has 2 HHA, PMH of MDS initially in remission , now mutated, and complicated by severe anemia and thrombocytopenia requiring blood transfusions, last of which was yesterday, 2 U, for Hgb of 4, dc home stable,  who comes in complaining of Melena, noted with rapid afib.    - Appears her MDS precluded use of anticoagulation in the past, now with a suspected GI bleed as well she is definitely not a candidate for anticoagulation at present  - Echo  - Cont Lopressor 25 mg PO BID  - Heme and GI f/u  - Monitor H/H and platelets     Siddhartha Duval MD, [facility]  BEEPER [phone]

## 2022-07-24 LAB
ALBUMIN SERPL ELPH-MCNC: 2.4 G/DL — LOW (ref 3.5–5)
ALP SERPL-CCNC: 54 U/L — SIGNIFICANT CHANGE UP (ref 40–120)
ALT FLD-CCNC: 15 U/L DA — SIGNIFICANT CHANGE UP (ref 10–60)
ANION GAP SERPL CALC-SCNC: 7 MMOL/L — SIGNIFICANT CHANGE UP (ref 5–17)
AST SERPL-CCNC: 14 U/L — SIGNIFICANT CHANGE UP (ref 10–40)
BILIRUB SERPL-MCNC: 0.8 MG/DL — SIGNIFICANT CHANGE UP (ref 0.2–1.2)
BUN SERPL-MCNC: 10 MG/DL — SIGNIFICANT CHANGE UP (ref 7–18)
CALCIUM SERPL-MCNC: 8.2 MG/DL — LOW (ref 8.4–10.5)
CHLORIDE SERPL-SCNC: 104 MMOL/L — SIGNIFICANT CHANGE UP (ref 96–108)
CO2 SERPL-SCNC: 26 MMOL/L — SIGNIFICANT CHANGE UP (ref 22–31)
CREAT SERPL-MCNC: 0.77 MG/DL — SIGNIFICANT CHANGE UP (ref 0.5–1.3)
EGFR: 80 ML/MIN/1.73M2 — SIGNIFICANT CHANGE UP
GLUCOSE SERPL-MCNC: 84 MG/DL — SIGNIFICANT CHANGE UP (ref 70–99)
HCT VFR BLD CALC: 23.6 % — LOW (ref 34.5–45)
HGB BLD-MCNC: 7.6 G/DL — LOW (ref 11.5–15.5)
MAGNESIUM SERPL-MCNC: 2.4 MG/DL — SIGNIFICANT CHANGE UP (ref 1.6–2.6)
MCHC RBC-ENTMCNC: 30.4 PG — SIGNIFICANT CHANGE UP (ref 27–34)
MCHC RBC-ENTMCNC: 32.2 GM/DL — SIGNIFICANT CHANGE UP (ref 32–36)
MCV RBC AUTO: 94.4 FL — SIGNIFICANT CHANGE UP (ref 80–100)
NRBC # BLD: 0 /100 WBCS — SIGNIFICANT CHANGE UP (ref 0–0)
PHOSPHATE SERPL-MCNC: 3.5 MG/DL — SIGNIFICANT CHANGE UP (ref 2.5–4.5)
PLATELET # BLD AUTO: 65 K/UL — LOW (ref 150–400)
POTASSIUM SERPL-MCNC: 3.5 MMOL/L — SIGNIFICANT CHANGE UP (ref 3.5–5.3)
POTASSIUM SERPL-SCNC: 3.5 MMOL/L — SIGNIFICANT CHANGE UP (ref 3.5–5.3)
PROT SERPL-MCNC: 5.1 G/DL — LOW (ref 6–8.3)
RBC # BLD: 2.5 M/UL — LOW (ref 3.8–5.2)
RBC # FLD: 16.9 % — HIGH (ref 10.3–14.5)
SODIUM SERPL-SCNC: 137 MMOL/L — SIGNIFICANT CHANGE UP (ref 135–145)
WBC # BLD: 3.61 K/UL — LOW (ref 3.8–10.5)
WBC # FLD AUTO: 3.61 K/UL — LOW (ref 3.8–10.5)

## 2022-07-24 RX ORDER — BACITRACIN ZINC 500 UNIT/G
1 OINTMENT IN PACKET (EA) TOPICAL
Refills: 0 | Status: DISCONTINUED | OUTPATIENT
Start: 2022-07-24 | End: 2022-07-25

## 2022-07-24 RX ORDER — LACTULOSE 10 G/15ML
15 SOLUTION ORAL ONCE
Refills: 0 | Status: COMPLETED | OUTPATIENT
Start: 2022-07-24 | End: 2022-07-24

## 2022-07-24 RX ORDER — LACTULOSE 10 G/15ML
SOLUTION ORAL
Refills: 0 | Status: DISCONTINUED | OUTPATIENT
Start: 2022-07-24 | End: 2022-07-24

## 2022-07-24 RX ADMIN — LACTULOSE 15 GRAM(S): 10 SOLUTION ORAL at 12:32

## 2022-07-24 RX ADMIN — PHENYLEPHRINE-SHARK LIVER OIL-MINERAL OIL-PETROLATUM RECTAL OINTMENT 1 APPLICATION(S): at 17:44

## 2022-07-24 RX ADMIN — PANTOPRAZOLE SODIUM 40 MILLIGRAM(S): 20 TABLET, DELAYED RELEASE ORAL at 17:41

## 2022-07-24 RX ADMIN — Medication 25 MILLIGRAM(S): at 17:41

## 2022-07-24 RX ADMIN — PANTOPRAZOLE SODIUM 40 MILLIGRAM(S): 20 TABLET, DELAYED RELEASE ORAL at 05:29

## 2022-07-24 RX ADMIN — PHENYLEPHRINE-SHARK LIVER OIL-MINERAL OIL-PETROLATUM RECTAL OINTMENT 1 APPLICATION(S): at 05:44

## 2022-07-24 RX ADMIN — Medication 25 MILLIGRAM(S): at 05:29

## 2022-07-24 NOTE — PROGRESS NOTE ADULT - SUBJECTIVE AND OBJECTIVE BOX
C A R D I O L O G Y  **********************************     DATE OF SERVICE: 07-24-22    Patient denies chest pain or shortness of breath.   Review of symptoms otherwise negative.    hemorrhoidal Ointment 1 Application(s) Rectal two times a day  metoprolol tartrate 25 milliGRAM(s) Oral two times a day  pantoprazole  Injectable 40 milliGRAM(s) IV Push every 12 hours                            7.6    3.61  )-----------( 66       ( 24 Jul 2022 05:13 )             23.6       Hemoglobin: 7.6 g/dL (07-24 @ 05:13)  Hemoglobin: 7.5 g/dL (07-23 @ 12:52)  Hemoglobin: 7.6 g/dL (07-23 @ 05:00)  Hemoglobin: 7.5 g/dL (07-23 @ 01:36)  Hemoglobin: 8.3 g/dL (07-22 @ 21:05)      07-24    137  |  104  |  10  ----------------------------<  84  3.5   |  26  |  0.77    Ca    8.2<L>      24 Jul 2022 05:13  Phos  3.5     07-24  Mg     2.4     07-24    TPro  5.1<L>  /  Alb  2.4<L>  /  TBili  0.8  /  DBili  x   /  AST  14  /  ALT  15  /  AlkPhos  54  07-24    Creatinine Trend: 0.77<--, 0.73<--, 1.01<--, 0.78<--, 0.69<--, 0.73<--    COAGS:           T(C): 36.8 (07-24-22 @ 10:48), Max: 37.1 (07-23-22 @ 15:59)  HR: 63 (07-24-22 @ 10:48) (58 - 78)  BP: 115/39 (07-24-22 @ 10:48) (103/45 - 160/51)  RR: 17 (07-24-22 @ 10:48) (17 - 19)  SpO2: 100% (07-24-22 @ 10:48) (96% - 100%)  Wt(kg): --    I&O's Summary      Gen: Appears well in NAD  HEENT:  (-)icterus (-)pallor  CV: N S1 S2 1/6 KECIA (+)2 Pulses B/l  Resp:  Clear to ausculatation B/L, normal effort  GI: (+) BS Soft, NT, ND  Lymph:  (-)Edema, (-)obvious lymphadenopathy  Skin: Warm to touch, Normal turgor  Psych: Appropriate mood and affect      TELEMETRY: 	  sinus        ASSESSMENT/PLAN: 	77y  Female  who lives at home with bedbound , and has 2 HHA, PMH of MDS initially in remission , now mutated, and complicated by severe anemia and thrombocytopenia requiring blood transfusions, last of which was yesterday, 2 U, for Hgb of 4, dc home stable,  who comes in complaining of Melena, noted with rapid afib.    - Appears her MDS precluded use of anticoagulation in the past, now with a suspected GI bleed as well she is definitely not a candidate for anticoagulation at present  - Echo with moderate to severe MR for medical management aim for SBP <120  - Cont Lopressor 25 mg PO BID  - Heme and GI f/u  - Monitor H/H and platelets     Siddhartha Duval MD, [facility]  BEEPER [phone]

## 2022-07-24 NOTE — PROGRESS NOTE ADULT - PROBLEM SELECTOR PLAN 1
Patient comes in with dark stools in the setting of Myelodysplastic syndrome, had transfusion the day prior to admission  -No longer having bloody bowel movements   -CT angio 7/19/22 = no signs of acute GI bleed  - s/p 1 u of platelets on admission (plt 65 today)  - s/p 3 u of pRBCs total, Hb 7.6 today, stable  -Had a colonoscopy/endo April, 2022 at [facility] (last admission to the hospital)   -c/w IV PPI BID  - 15mg lactulose syrup given to clear out bowel proximally (pt. on clears and still having solid stools)  -CBC am  -GI = no indication to scope, transfuse as needed  -d/c tomorrow morning

## 2022-07-24 NOTE — PROGRESS NOTE ADULT - ATTENDING COMMENTS
HPI:  Patient is a 78 yo female who lives at home with bedbound , and has 2 HHA, PMH of MDS initially in remission , now mutated, and complicated by severe anemia and thrombocytopenia requiring blood transfusions, last of which was yesterday, 2 U, for Hgb of 4, dc home stable,  who comes in complaining of Melena, described as dark diarrhea, following hard stools the day prior.    OF note patient also had transfusion 2 weeks ago, at [facility] where she was found with hgb of 3. Patient was also found with one episode of A-fib RVR which resolved, as well as asymptomatic bacteriuria which was treated.    Now presents with dark stools, lightheadedness, low appetite, not eaten since 4 dyas ago, nausea with dry heaves, no vomiting, denies fever, chills, changes in urine output.    In the ED patient was AF, and sats 98% on RA, however  BP 80/50 EKG showing A-fib and   Exam: notable for cachectic pleasant female, with no distress, pale skin, but benign exam overall  Labs: showed Hgb of 6.5, K 3, BNP 5K, COVID +Ve (not new) occult blood was +Ve    CODE fusion was called and 2U of blood was given and a liter bolus  For the A fib RVR diltiazem was given at 5mg   K was replaced  lasix and CXR ordered  due to s/o volume overload  and patient was admitted for GI bleed       (20 Jul 2022 17:32)      # CASE D/W DAUGHTER HOLLY CARNEY VIA PHONE @ [phone] [7/21]; CONVEYED THAT PROGNOSIS IS GUARDED, PATIENT AND FAMILY VERBALIZED UNDERSTANDING    # CASE DISCUSSED W/ DAUGHTER HOLLY CARNEY VIA PHONE @ [phone]  AT BEDSIDE. CASE DISCUSSED AT LENGTH W/ PATIENT AND DAUGHTER - ALL QUESTIONS ANSWERED    # R/O GI BLEED  # NORMOCYTIC ANEMIA, MDS  # THROMBOCYTOPENIA   # CHRONIC CONSTIPATION, HX OF HEMORRHOID  - GIVEN 2 UNITS PRBC, 2 UNITS PLATELETS AND 1 UNIT FFP; PATIENT WAS ALSO TRANSFUSED 7/19/22   - F/U POST TRANSFUSION CBC, MONITOR SYMPTOMS CLOSELY DURING TRANSFUSION  - TREND HGB, PPI IV BID  - NOTED CT A/P  - TRANSFUSION THRESHOLD HGB < 7  - TRANSFUSION THRESHOLD FOR PLT < 10 [IF NO BLEEDING], < 50 [IF BLEEDING]  - GASTROENTEROLOGY CONSULT  - HEME/ONC CONSULT    - EGD/COLONOSCOPY IN 2022 AND ?CAPSULE ENDOSCOPY - WILL OBTAIN RECORDS    - PLANNED FOR PRBC TRANSFUSION 7/22 ; D/W GI AND HEME/ONC  - NONTENDER EXAM OF ABDOMEN  - WILL MONITOR CLOSELY FOR MELENA/HEMATOCHEZIA/HEMATEMESIS    - LOW THRESHOLD FOR CRITICAL CARE CONSULT, D/W RESIDENT TEAM    - PATIENT WAS GIVEN MAG. CITRATE FOR CONSTIPATION - SHE HAD PAINLESS BRBPR OVERNIGHT, SHE EXPRESSED THAT SUBSEQUENTLY SHE PASSED  SOFT, BROWN STOOL W/ OUT ANY OVERT BLEEDING. HER HGB HAS BEEN TRENDED. PATIENT AND STAFF COUNSELLED TO MONITOR FOR BLEEDING.   - ORDERED HEMORRHOID CREAM    - NO BRBPR OVERNIGHT, DIET ADVANCED FROM CLD TO FLD TO REGULAR DIET       # AFIB W/ RVR - IMPROVED  - PRN RATE CONTROL  - MONITOR ON TELEMETRY  - F/U ECHOCARDIOGRAM  - HOLD A/C GIVEN CONCERN FOR BLEEDING; DISCUSSED WITH PATIENT AND WITH DAUGHTER REGARDING BLEEDING RISK VS. STROKE RISK. BOTH VERBALIZED UNDERSTANDING   - STARTED ON METOPROLOL PER CARDIOLOGY  - CARDIOLOGY CONSULT    # COVID19 INFECTION  - PATIENT IS S/P ? MEDICATION [ SUSPECT PAXLOVID PER DAUGHTER ]  - MONITOR PO2, MONITOR INFLAMMATORY MARKERS  - MAY DEFER CONTACT AND DROPLET ISOLATION PRECAUTIONS  -- PER FAMILY PATIENT TESTED POSITIVE ON APPROXIMATELY 07/02/2022 AT [facility] -- AWAITING PAPERWORK FROM FAMILY. DISCUSSED WITH INFECTION CONTROL TEAM AT [place].     # SUSPECT MODERATE PROTEIN CALORIE MALNUTRITION  - NUTRITIONAL SUPPLEMENTATION    # HYPOKALEMIA  - REPLETE WITH SUPPLEMENT  - MONITORING CLOSELY    # GI PPX; AT [facility]

## 2022-07-24 NOTE — PROGRESS NOTE ADULT - ASSESSMENT
Patient is a 76 yo female who lives at home with bedbound , and has 2 HHA, PMH of MDS initially in remission , now mutated, and complicated by severe anemia and thrombocytopenia requiring blood transfusions, last of which was yesterday, 2 U, for Hgb of 4, dc home stable,  who comes in complaining of Melena, described as dark diarrhea, following hard stools the day prior found to have BP 80/50 EKG showing A-fib and , Hgb of 6.5, K 3, BNP 5K,  occult blood was +Ve s/p CODE fusion was called and 2U of blood was given and a liter bolus, K repalced, HR self convered, lasix and CXR ordered  due to s/o volume overload and patient was admitted for GI bleed and monitoring for A fib RVR.

## 2022-07-24 NOTE — PROGRESS NOTE ADULT - PROBLEM SELECTOR PLAN 7
Episodes of hypokalemia since admission (2.4 > 2.8 > 3 > 3.9 > 3.5)  Potassium repleted Iv/oral  currently potassium 3.5, resolved
Episodes of hypokalemia since admission (2.4 > 2.8 > 3 > 3.9)  Potassium repleted Iv/oral  currently potassium 3.9, resolved
Episodes of hypokalemia since admission (2.4 > 2.8 > 3 > 3.9)  Potassium repleted Iv/oral  currently potassium 3.9, resolved

## 2022-07-24 NOTE — PROGRESS NOTE ADULT - PROBLEM SELECTOR PLAN 5
Patient has had positive covid since admission in [facility]  no need for isolation
Patient has had positive covid since admission in [facility]  no need for isolation
Patient has had positive covid since admission in [facility]  no need for isolation
Patient has had positive covid since admission in [facility]  no need for isolation

## 2022-07-24 NOTE — PROGRESS NOTE ADULT - PROBLEM SELECTOR PLAN 3
Patient has MDS initally on remission, now recurring  f/u dr Mosley, who is referring them for evaluation at  [facility]   However now it is complicated by anemia requiring frequent transfusions  will be dc once hgb is stable to c/w follow up  Per [facility] open to hospice in the future- if prognosis is poor
Patient has MDS initally on remission, now recurring  f/u dr Mosley, who is [place] them [place]  [facility]   However now it is complicated by anemia requiring frequent transfusions  will be dc once hgb is stable to c/w follow up  Per [facility] open to hospice in the future- if prognosis is poor
Patient has MDS initally on remission, now recurring  f/u dr Mosley, who is referring them for evaluation at  [facility]   However now it is complicated by anemia requiring frequent transfusions  will be dc once hgb is stable to c/w follow up  Per [facility] open to hospice in the future- if prognosis is poor
Patient has MDS initially on remission, now recurring  f/u dr Mosley, who is referring them for evaluation at  [facility]   However now it is complicated by anemia requiring frequent transfusions  will be dc once hgb is stable to c/w follow up  Per [facility] open to hospice in the future- if prognosis is poor

## 2022-07-24 NOTE — PROGRESS NOTE ADULT - SUBJECTIVE AND OBJECTIVE BOX
PGY-1 Progress Note discussed with attending    CHIEF COMPLAINT & BRIEF HOSPITAL COURSE:    Patient is a 76 yo female who lives at home with bedbound , and has 2 HHA, PMH of MDS initially in remission , now mutated, and complicated by severe anemia and thrombocytopenia requiring blood transfusions, last of which was yesterday, 2 U, for Hgb of 4, dc home stable,  who comes in complaining of Melena, described as dark diarrhea, following hard stools the day prior found to have BP 80/50 EKG showing A-fib and , Hgb of 6.5, K 3, BNP 5K,  occult blood was +Ve s/p CODE fusion was called and 2U of blood was given and a liter bolus, K repalced, HR self convered, lasix and CXR ordered  due to s/o volume overload and patient was admitted for GI bleed and monitoring for A fib RVR.    INTERVAL HPI/OVERNIGHT EVENTS:     Pt. feeling much better, no episodes of bloody stool yesterday or today, and wants to advance her diet. She reports some gas, and slight abdominal pain when she has gas, but she is otherwise feeling much better. No weakness, lightheadedness, fatigue, chest pain, SOB, n/v/d.     MEDICATIONS  (STANDING):  hemorrhoidal Ointment 1 Application(s) Rectal two times a day  lactulose Syrup 15 Gram(s) Oral once  metoprolol tartrate 25 milliGRAM(s) Oral two times a day  pantoprazole  Injectable 40 milliGRAM(s) IV Push every 12 hours    MEDICATIONS  (PRN):      REVIEW OF SYSTEMS:  CONSTITUTIONAL: No fever, weight loss, or fatigue  RESPIRATORY: No cough, wheezing, chills or hemoptysis; No shortness of breath  CARDIOVASCULAR: No chest pain, palpitations, dizziness, or leg swelling  GASTROINTESTINAL: No abdominal pain. No nausea, vomiting, or hematemesis; No diarrhea or constipation. No melena or hematochezia.  GENITOURINARY: No dysuria or hematuria, urinary frequency  NEUROLOGICAL: No headaches, memory loss, loss of strength, numbness, or tremors  SKIN: No itching, burning, rashes, or lesions     Vital Signs Last 24 Hrs  T(C): 36.8 (24 Jul 2022 10:48), Max: 37.1 (23 Jul 2022 15:59)  T(F): 98.2 (24 Jul 2022 10:48), Max: 98.8 (23 Jul 2022 15:59)  HR: 63 (24 Jul 2022 10:48) (58 - 78)  BP: 115/39 (24 Jul 2022 10:48) (103/45 - 160/51)  BP(mean): --  RR: 17 (24 Jul 2022 10:48) (17 - 19)  SpO2: 100% (24 Jul 2022 10:48) (96% - 100%)    Parameters below as of 24 Jul 2022 10:48  Patient On (Oxygen Delivery Method): room air        PHYSICAL EXAMINATION:  GENERAL: NAD, well built  HEAD:  Atraumatic, Normocephalic  EYES:  conjunctiva and sclera clear  NECK: Supple, No JVD, Normal thyroid  CHEST/LUNG: Clear to auscultation. Clear to percussion bilaterally; No rales, rhonchi, wheezing, or rubs  HEART: Regular rate and rhythm; No murmurs, rubs, or gallops  ABDOMEN: Soft, Nontender, Nondistended; Bowel sounds present  NERVOUS SYSTEM:  Alert & Oriented X3,    EXTREMITIES:  2+ Peripheral Pulses, No clubbing, cyanosis, or edema  SKIN: warm dry                          7.6    3.61  )-----------( 65       ( 24 Jul 2022 05:13 )             23.6     07-24    137  |  104  |  10  ----------------------------<  84  3.5   |  26  |  0.77    Ca    8.2<L>      24 Jul 2022 05:13  Phos  3.5     07-24  Mg     2.4     07-24    TPro  5.1<L>  /  Alb  2.4<L>  /  TBili  0.8  /  DBili  x   /  AST  14  /  ALT  15  /  AlkPhos  54  07-24    LIVER FUNCTIONS - ( 24 Jul 2022 05:13 )  Alb: 2.4 g/dL / Pro: 5.1 g/dL / ALK PHOS: 54 U/L / ALT: 15 U/L DA / AST: 14 U/L / GGT: x                   CAPILLARY BLOOD GLUCOSE      RADIOLOGY & ADDITIONAL TESTS:

## 2022-07-24 NOTE — PROGRESS NOTE ADULT - PROBLEM SELECTOR PLAN 6
SCD due to active bleed  PPI IV BID

## 2022-07-24 NOTE — PROGRESS NOTE ADULT - PROBLEM SELECTOR PLAN 2
New onset 2 weeks ago at [facility]  In the setting of profound anemia might be reactive  Patient HR has become controlled after bolus and blood transfusion  -TTE 7/21: Marked posterior mitral annular calcification. Moderate to severe mitral regurgitation. Dilated HF.  -PRN cardizem if bp allows  -Dr. Duval = on Lopressor 25 mg PO BID: HR controlled  avoiding ac due to risk of bleeding

## 2022-07-25 ENCOUNTER — TRANSCRIPTION ENCOUNTER (OUTPATIENT)
Age: 77
End: 2022-07-25

## 2022-07-25 ENCOUNTER — APPOINTMENT (OUTPATIENT)
Dept: CT IMAGING | Facility: HOSPITAL | Age: 77
End: 2022-07-25

## 2022-07-25 VITALS
SYSTOLIC BLOOD PRESSURE: 157 MMHG | OXYGEN SATURATION: 97 % | HEART RATE: 70 BPM | DIASTOLIC BLOOD PRESSURE: 52 MMHG | TEMPERATURE: 98 F | RESPIRATION RATE: 19 BRPM

## 2022-07-25 LAB
ALBUMIN SERPL ELPH-MCNC: 2.4 G/DL — LOW (ref 3.5–5)
ALP SERPL-CCNC: 59 U/L — SIGNIFICANT CHANGE UP (ref 40–120)
ALT FLD-CCNC: 16 U/L DA — SIGNIFICANT CHANGE UP (ref 10–60)
ANION GAP SERPL CALC-SCNC: 8 MMOL/L — SIGNIFICANT CHANGE UP (ref 5–17)
AST SERPL-CCNC: 17 U/L — SIGNIFICANT CHANGE UP (ref 10–40)
BILIRUB SERPL-MCNC: 0.6 MG/DL — SIGNIFICANT CHANGE UP (ref 0.2–1.2)
BLD GP AB SCN SERPL QL: SIGNIFICANT CHANGE UP
BUN SERPL-MCNC: 10 MG/DL — SIGNIFICANT CHANGE UP (ref 7–18)
CALCIUM SERPL-MCNC: 8.3 MG/DL — LOW (ref 8.4–10.5)
CHLORIDE SERPL-SCNC: 107 MMOL/L — SIGNIFICANT CHANGE UP (ref 96–108)
CO2 SERPL-SCNC: 24 MMOL/L — SIGNIFICANT CHANGE UP (ref 22–31)
CREAT SERPL-MCNC: 0.89 MG/DL — SIGNIFICANT CHANGE UP (ref 0.5–1.3)
EGFR: 67 ML/MIN/1.73M2 — SIGNIFICANT CHANGE UP
GLUCOSE SERPL-MCNC: 133 MG/DL — HIGH (ref 70–99)
HCT VFR BLD CALC: 22.6 % — LOW (ref 34.5–45)
HGB BLD-MCNC: 7.1 G/DL — LOW (ref 11.5–15.5)
MAGNESIUM SERPL-MCNC: 2.3 MG/DL — SIGNIFICANT CHANGE UP (ref 1.6–2.6)
MCHC RBC-ENTMCNC: 30.9 PG — SIGNIFICANT CHANGE UP (ref 27–34)
MCHC RBC-ENTMCNC: 31.4 GM/DL — LOW (ref 32–36)
MCV RBC AUTO: 98.3 FL — SIGNIFICANT CHANGE UP (ref 80–100)
NRBC # BLD: 0 /100 WBCS — SIGNIFICANT CHANGE UP (ref 0–0)
PHOSPHATE SERPL-MCNC: 3.7 MG/DL — SIGNIFICANT CHANGE UP (ref 2.5–4.5)
PLATELET # BLD AUTO: 46 K/UL — LOW (ref 150–400)
POTASSIUM SERPL-MCNC: 3.7 MMOL/L — SIGNIFICANT CHANGE UP (ref 3.5–5.3)
POTASSIUM SERPL-SCNC: 3.7 MMOL/L — SIGNIFICANT CHANGE UP (ref 3.5–5.3)
PROT SERPL-MCNC: 5.1 G/DL — LOW (ref 6–8.3)
RBC # BLD: 2.3 M/UL — LOW (ref 3.8–5.2)
RBC # FLD: 17.6 % — HIGH (ref 10.3–14.5)
SODIUM SERPL-SCNC: 139 MMOL/L — SIGNIFICANT CHANGE UP (ref 135–145)
WBC # BLD: 4.43 K/UL — SIGNIFICANT CHANGE UP (ref 3.8–10.5)
WBC # FLD AUTO: 4.43 K/UL — SIGNIFICANT CHANGE UP (ref 3.8–10.5)

## 2022-07-25 PROCEDURE — 80053 COMPREHEN METABOLIC PANEL: CPT

## 2022-07-25 PROCEDURE — P9100: CPT

## 2022-07-25 PROCEDURE — 93306 TTE W/DOPPLER COMPLETE: CPT

## 2022-07-25 PROCEDURE — 83735 ASSAY OF MAGNESIUM: CPT

## 2022-07-25 PROCEDURE — P9059: CPT

## 2022-07-25 PROCEDURE — 84484 ASSAY OF TROPONIN QUANT: CPT

## 2022-07-25 PROCEDURE — 84443 ASSAY THYROID STIM HORMONE: CPT

## 2022-07-25 PROCEDURE — 96374 THER/PROPH/DIAG INJ IV PUSH: CPT

## 2022-07-25 PROCEDURE — 87635 SARS-COV-2 COVID-19 AMP PRB: CPT

## 2022-07-25 PROCEDURE — 85730 THROMBOPLASTIN TIME PARTIAL: CPT

## 2022-07-25 PROCEDURE — 85025 COMPLETE CBC W/AUTO DIFF WBC: CPT

## 2022-07-25 PROCEDURE — 36415 COLL VENOUS BLD VENIPUNCTURE: CPT

## 2022-07-25 PROCEDURE — 80048 BASIC METABOLIC PNL TOTAL CA: CPT

## 2022-07-25 PROCEDURE — 99232 SBSQ HOSP IP/OBS MODERATE 35: CPT

## 2022-07-25 PROCEDURE — 86900 BLOOD TYPING SEROLOGIC ABO: CPT

## 2022-07-25 PROCEDURE — 86901 BLOOD TYPING SEROLOGIC RH(D): CPT

## 2022-07-25 PROCEDURE — 74018 RADEX ABDOMEN 1 VIEW: CPT

## 2022-07-25 PROCEDURE — 82272 OCCULT BLD FECES 1-3 TESTS: CPT

## 2022-07-25 PROCEDURE — 74174 CTA ABD&PLVS W/CONTRAST: CPT | Mod: MA

## 2022-07-25 PROCEDURE — 84100 ASSAY OF PHOSPHORUS: CPT

## 2022-07-25 PROCEDURE — P9040: CPT

## 2022-07-25 PROCEDURE — 84436 ASSAY OF TOTAL THYROXINE: CPT

## 2022-07-25 PROCEDURE — 86922 COMPATIBILITY TEST ANTIGLOB: CPT

## 2022-07-25 PROCEDURE — 83880 ASSAY OF NATRIURETIC PEPTIDE: CPT

## 2022-07-25 PROCEDURE — P9037: CPT

## 2022-07-25 PROCEDURE — 36430 TRANSFUSION BLD/BLD COMPNT: CPT

## 2022-07-25 PROCEDURE — 84480 ASSAY TRIIODOTHYRONINE (T3): CPT

## 2022-07-25 PROCEDURE — 93005 ELECTROCARDIOGRAM TRACING: CPT

## 2022-07-25 PROCEDURE — 85610 PROTHROMBIN TIME: CPT

## 2022-07-25 PROCEDURE — 99291 CRITICAL CARE FIRST HOUR: CPT

## 2022-07-25 PROCEDURE — 85027 COMPLETE CBC AUTOMATED: CPT

## 2022-07-25 PROCEDURE — 86850 RBC ANTIBODY SCREEN: CPT

## 2022-07-25 PROCEDURE — 71045 X-RAY EXAM CHEST 1 VIEW: CPT

## 2022-07-25 RX ORDER — PHENYLEPHRINE-SHARK LIVER OIL-MINERAL OIL-PETROLATUM RECTAL OINTMENT
1 OINTMENT (GRAM) RECTAL
Qty: 4 | Refills: 0
Start: 2022-07-25 | End: 2022-08-07

## 2022-07-25 RX ORDER — METOPROLOL TARTRATE 50 MG
1 TABLET ORAL
Qty: 60 | Refills: 0
Start: 2022-07-25 | End: 2022-08-23

## 2022-07-25 RX ORDER — LACTULOSE 10 G/15ML
10 SOLUTION ORAL ONCE
Refills: 0 | Status: COMPLETED | OUTPATIENT
Start: 2022-07-25 | End: 2022-07-25

## 2022-07-25 RX ORDER — PANTOPRAZOLE SODIUM 20 MG/1
1 TABLET, DELAYED RELEASE ORAL
Qty: 30 | Refills: 0
Start: 2022-07-25 | End: 2022-08-23

## 2022-07-25 RX ORDER — FAMOTIDINE 10 MG/ML
1 INJECTION INTRAVENOUS
Qty: 0 | Refills: 0 | DISCHARGE

## 2022-07-25 RX ORDER — AMLODIPINE BESYLATE 2.5 MG/1
1 TABLET ORAL
Qty: 0 | Refills: 0 | DISCHARGE

## 2022-07-25 RX ADMIN — LACTULOSE 10 GRAM(S): 10 SOLUTION ORAL at 12:20

## 2022-07-25 RX ADMIN — PHENYLEPHRINE-SHARK LIVER OIL-MINERAL OIL-PETROLATUM RECTAL OINTMENT 1 APPLICATION(S): at 05:52

## 2022-07-25 RX ADMIN — PHENYLEPHRINE-SHARK LIVER OIL-MINERAL OIL-PETROLATUM RECTAL OINTMENT 1 APPLICATION(S): at 17:07

## 2022-07-25 RX ADMIN — PANTOPRAZOLE SODIUM 40 MILLIGRAM(S): 20 TABLET, DELAYED RELEASE ORAL at 05:54

## 2022-07-25 RX ADMIN — Medication 25 MILLIGRAM(S): at 17:06

## 2022-07-25 RX ADMIN — Medication 25 MILLIGRAM(S): at 05:52

## 2022-07-25 NOTE — PROGRESS NOTE ADULT - PROVIDER SPECIALTY LIST ADULT
Cardiology
Internal Medicine
Cardiology
Heme/Onc
Gastroenterology
Heme/Onc
Gastroenterology
Internal Medicine

## 2022-07-25 NOTE — PROGRESS NOTE ADULT - ASSESSMENT
Patient is a 76 yo female who lives at home with bedbound , and has 2 HHA, PMH of MDS initially in remission , now mutated, and complicated by severe anemia and thrombocytopenia requiring blood transfusions, last of which was yesterday, 2 U, for Hgb of 4, dc home stable,  who comes in complaining of Melena, described as dark diarrhea, following hard stools the day prior.Of note patient also had transfusion 2 weeks ago, at [facility] where she was found with hgb of 3. Patient was also found with one episode of A-fib RVR which resolved, as well as asymptomatic bacteriuria which was treated. Now presents with dark stools, lightheadedness, low appetite, not eaten since 4 dyas ago, nausea with dry heaves, no vomiting, denies fever, chills, changes in urine output.    Patient was admitted for GI Bleed. GI was consulted for further evaluation.

## 2022-07-25 NOTE — PROGRESS NOTE ADULT - SUBJECTIVE AND OBJECTIVE BOX
GI Progress Note    Patient is a 77y old  Female who presents with a chief complaint of Melena (25 Jul 2022 10:56)       GI INTERVAL HPI/OVERNIGHT EVENTS: Pt was seen at bedside, NAD, no new complaints. Pt reports last BM was yesterday, nonbloody. Pt is tolerating diet, denies N/V. No overt signs of bleeding.    MEDICATIONS  (STANDING):  hemorrhoidal Ointment 1 Application(s) Rectal two times a day  metoprolol tartrate 25 milliGRAM(s) Oral two times a day  pantoprazole  Injectable 40 milliGRAM(s) IV Push every 12 hours    MEDICATIONS  (PRN):  BACItracin   Ointment 1 Application(s) Topical two times a day PRN skin tear      __________________________________________________  REVIEW OF SYSTEMS:    CONSTITUTIONAL:  No weight loss, fever, chills, weakness or fatigue.  HEENT:  Eyes:  No visual loss, blurred vision, double vision or yellow sclerae. Ears, Nose, Throat:  No hearing loss, sneezing, congestion, runny nose or sore throat.  SKIN:  No rash or itching.  CARDIOVASCULAR:  No chest pain, chest pressure or chest discomfort. No palpitations or edema.  RESPIRATORY: No SOB. No  cough or sputum.  GASTROINTESTINAL:  SEE HPI  GENITOURINARY:  No dysuria, hematuria, urinary frequency  NEUROLOGICAL:  No headache, dizziness, syncope, paralysis, ataxia, numbness or tingling in the extremities. No change in bowel or bladder control.  MUSCULOSKELETAL:  No muscle, back pain, joint pain or stiffness.  HEMATOLOGIC:  No anemia, bleeding or bruising.  LYMPHATICS:  No enlarged nodes. No history of splenectomy.  PSYCHIATRIC:  No history of depression or anxiety.  ENDOCRINOLOGIC:  No reports of sweating, cold or heat intolerance. No polyuria or polydipsia.      ________________________________________________  PHYSICAL EXAM    Gen: NAD  HEENT: PERRL, anicteric, no oral mucositis  Resp: Clear breath sounds on auscultation. No rales, no wheezing  CVS: S1S2 present, regular  GI: BS(+), Soft, ND, NT  Extremities : BLE No edema or calf tenderness. PPP  Neuro: Awake/alert.  no new neuro deficits  Skin: warm,dry,no rash    Vital Signs Last 24 Hrs  T(C): 36.8 (25 Jul 2022 11:44), Max: 36.9 (24 Jul 2022 16:05)  T(F): 98.2 (25 Jul 2022 11:44), Max: 98.5 (24 Jul 2022 16:05)  HR: 70 (25 Jul 2022 11:44) (63 - 80)  BP: 129/83 (25 Jul 2022 11:44) (100/52 - 145/70)  BP(mean): --  RR: 18 (25 Jul 2022 11:44) (18 - 18)  SpO2: 96% (25 Jul 2022 11:44) (95% - 100%)    Parameters below as of 25 Jul 2022 11:44  Patient On (Oxygen Delivery Method): room air        _________________________________________________  LABS:                        7.1    4.43  )-----------( 46       ( 25 Jul 2022 10:15 )             22.6     07-25    139  |  107  |  10  ----------------------------<  133<H>  3.7   |  24  |  0.89    Ca    8.3<L>      25 Jul 2022 08:16  Phos  3.7     07-25  Mg     2.3     07-25    TPro  5.1<L>  /  Alb  2.4<L>  /  TBili  0.6  /  DBili  x   /  AST  17  /  ALT  16  /  AlkPhos  59  07-25        CAPILLARY BLOOD GLUCOSE        COVID-19 PCR: Detected (20 Jul 2022 13:55)  COVID-19 PCR: NotDetec (12 May 2022 16:05)              RADIOLOGY & ADDITIONAL TESTS:  < from: CT Angio Abdomen and Pelvis w/ IV Cont (07.20.22 @ 21:16) >  CT ANGIO ABD PELV (W)AW IC                          *** ADDENDUM***    Additional clinical history: Lower GI bleed. Hypotension.    --- End of Report ---    *** END OF ADDENDUM***    < end of copied text >  < from: Xray Abdomen 1 View PORTABLE -Urgent (Xray Abdomen 1 View PORTABLE -Urgent .) (07.23.22 @ 11:39) >   XR ABDOMEN PORTABLE URGENT 1V                          PROCEDURE DATE:  07/23/2022          INTERPRETATION:  KUB: AP    COMPARISON: CT abdomen 7/20/2022.    CLINICAL INFORMATION: Constipation.    FINDINGS:  Nonspecific nonobstructive bowel gas pattern.. No radiographic evidence   of abnormal fecal load.  No free intra-abdominal air.  No obvious intra-abdominal masses.  No abnormal calcifications.  The osseous structures are intact.    IMPRESSION: Nonspecific nonobstructive bowel gas pattern.. No fecal   impaction.  Please see CT scan 7/20/2022    --- End of Report ---    < end of copied text >

## 2022-07-25 NOTE — PROGRESS NOTE ADULT - PROBLEM SELECTOR PLAN 1
- P/w low hemoglobin of 4 and hypokalemia and dark stools in the setting of Myelodysplastic syndrome  - Patient has received 3 units pRBC, 2 units platelets, and 1 unit plasma since admission  - Hbg/Hct: 8.8/25.6 ->6/18  - Platelet: 92 -> 105  - (+) FOBT  - CT angio: No bowel obstruction. Appendix is not visualized. No evidence of inflammation in the pericecal region. Moderate sliding hiatus hernia. No intraluminal contrast extravasation. Mild colonic fecal burden. No CT evidence of active GI bleed. Constipated colon.  - PPI BID  - Repeat H/H, if stable c/w diet as tolerated  - Trend CBC  - If Hg < 7, transfuse.

## 2022-07-25 NOTE — DISCHARGE NOTE PROVIDER - NSDCMRMEDTOKEN_GEN_ALL_CORE_FT
metoprolol tartrate 25 mg oral tablet: 1 tab(s) orally 2 times a day  pantoprazole 40 mg oral delayed release tablet: 1 tab(s) orally once a day  Preparation H Cooling 0.25% rectal gel: 1 application rectal 2 times a day

## 2022-07-25 NOTE — DISCHARGE NOTE PROVIDER - HOSPITAL COURSE
Patient is a 78 yo female who lives at home with bedbound , and has 2 HHA, PMH of MDS initially in remission , now complicated by severe anemia and thrombocytopenia requiring blood transfusions. On admission she endorsed of melena and some bright red blood per stools, found to have BP 80/50 EKG showing A-fib and , Hgb of 6.5, K 3, BNP 5K,  occult blood was +Ve s/p CODE fusion was called and 2U of blood was given and a liter bolus, K repalced, HR self convered, lasix and CXR ordered  due to s/o volume overload. She was admitted to medicine for further workup of possible GI bleed, and acute treatment of anemia and thrombocytopenia due to her MDS.     During her admission, pt was hemodynamically stable, afebrile. She recieved 2 more units of blood on 7/22 due to a Hb <7. Platelets stayed above 50, so no additional platelet transfusion was indicated by Dr. Mosley, her hematologist. GI, cardiology, and hematology was following her throughout her hospital stay. GI thought that it was not indicated to do a colonoscopy/endoscopy (recently had both done 1 month ago at [facility]), and the results were normal. There was also a low suspicion for a GI bleed since the pt. was symptomatically improving and no longer endorsing diarrhea or dark stools/bloody stools. They recommended PPI BID and close monitoring of H/H. On 7/25 her Hb was 7.1, so Dr. Mosley wanted her to receive 1 unit of pRBCs, and then discharge home. Her afib with RVR was treated with metroprolol.    Patient is a 76 yo female who lives at home with bedbound , and has 2 HHA, PMH of MDS initially in remission , now complicated by severe anemia and thrombocytopenia requiring blood transfusions. On admission she endorsed of melena and some bright red blood per stools, found to have BP 80/50 EKG showing A-fib and , Hgb of 6.5, K 3, BNP 5K,  occult blood was +Ve s/p CODE fusion was called and 2U of blood was given and a liter bolus, K repalced, HR self convered, lasix and CXR ordered  due to s/o volume overload. She was admitted to medicine for further workup of possible GI bleed, and acute treatment of anemia and thrombocytopenia due to her MDS.     During her admission, pt was hemodynamically stable, afebrile. She recieved 2 more units of blood on 7/22 due to a Hb <7. Platelets stayed above 50, so no additional platelet transfusion was indicated by Dr. Mosley, her hematologist. GI, cardiology, and hematology was following her throughout her hospital stay. GI thought that it was not indicated to do a colonoscopy/endoscopy (recently had both done 1 month ago at [facility]), and the results were normal. There was also a low suspicion for a GI bleed since the pt. was symptomatically improving and no longer endorsing diarrhea or dark stools/bloody stools. They recommended PPI BID and close monitoring of H/H. On 7/25 her Hb was 7.1, so Dr. Mosley wanted her to receive 1 unit of pRBCs, and then discharge home. Her afib with RVR was treated with metroprolol.    Patient is a 78 yo female who lives at home with bedbound , and has 2 HHA, PMH of MDS initially in remission , now complicated by severe anemia and thrombocytopenia requiring blood transfusions. On admission she endorsed of melena and some bright red blood per stools, found to have BP 80/50 EKG showing A-fib and , Hgb of 6.5, K 3, BNP 5K,  occult blood was +Ve s/p CODE fusion was called and 2U of blood was given and a liter bolus, K repalced, HR self convered, lasix and CXR ordered  due to s/o volume overload. She was admitted to medicine for further workup of possible GI bleed, and acute treatment of anemia and thrombocytopenia due to her MDS.   During her admission, pt was hemodynamically stable, afebrile. She recieved 2 more units of blood on 7/22 due to a Hb <7. Platelets stayed above 50, so no additional platelet transfusion was indicated by Dr. Mosley, her hematologist. GI, cardiology, and hematology was following her throughout her hospital stay. GI thought that it was not indicated to do a colonoscopy/endoscopy (recently had both done 1 month ago at [facility]), and the results were normal. There was also a low suspicion for a GI bleed since the pt. was symptomatically improving and no longer endorsing diarrhea or dark stools/bloody stools. They recommended PPI BID and close monitoring of H/H. On 7/25 her Hb was 7.1, so Dr. Mosley wanted her to receive 1 unit of pRBCs, and then discharge home. Her afib with RVR was treated with metroprolol.     Patient is stable for discharge per attending and is advised to follow up with PCP as outpatient  Please refer to patient's complete medical chart with documents for a full hospital course, for this is only a brief summary.

## 2022-07-25 NOTE — PROGRESS NOTE ADULT - ASSESSMENT
IGNACIO RAMIRES is a 77y Female who presents with a chief complaint of melena    Cytopenia  - Patient with diagnosis of myelodysplastic syndrome; follows with Dr. Dali Mosley.  - She has baseline severe anemia and thrombocytopenia from disease.   - No treatment while inpatient/rehabilitation.  - Monitor CBC and transfuse to maintain HGB > 7 and PLT > 50.     Melena  - Gastroenterology following. Follow-up on recommendations.  - Received 3 units of blood and 2 doses of platelets since presentation.  - CT angiogram noted; no evidence of active bleeding.    Will continue to follow.    Junior Alfredo MD  Hematology/Oncology  C: [phone]  O: [phone] IGNACIO RAMIRES is a 77y Female who presents with a chief complaint of melena    Cytopenia  - Patient with diagnosis of myelodysplastic syndrome; follows with Dr. Dali Mosley.  - She has baseline severe anemia and thrombocytopenia from disease.   - No treatment while inpatient/rehabilitation.  - Monitor CBC and transfuse to maintain HGB > 7 and PLT > 50.   - Pending follow-up at [facility] in early August.     Melena  - Gastroenterology following. Follow-up on recommendations.  - Received 3 units of blood and 2 doses of platelets since presentation.  - CT angiogram noted; no evidence of active bleeding.    Will continue to follow.    Junior Alfredo MD  Hematology/Oncology  O: [phone]

## 2022-07-25 NOTE — DISCHARGE NOTE PROVIDER - CARE PROVIDER_API CALL
Amarilys Mosley  INTERNAL MEDICINE  [address]  [place] [id number]  Phone: [phone]  Fax: [phone]  Follow Up Time: 2 weeks    Sudhir Gerard)  Gastroenterology; Internal Medicine  [address] [facility] [place] [address]  [place] [address]  Phone: [phone]  Fax: [phone]  Follow Up Time: 2 weeks

## 2022-07-25 NOTE — DISCHARGE NOTE PROVIDER - NSDCCPCAREPLAN_GEN_ALL_CORE_FT
PRINCIPAL DISCHARGE DIAGNOSIS  Diagnosis: GIB (gastrointestinal bleeding)  Assessment and Plan of Treatment:       SECONDARY DISCHARGE DIAGNOSES  Diagnosis: MDS (myelodysplastic syndrome)  Assessment and Plan of Treatment:     Diagnosis: Chronic atrial fibrillation  Assessment and Plan of Treatment:      PRINCIPAL DISCHARGE DIAGNOSIS  Diagnosis: GIB (gastrointestinal bleeding)  Assessment and Plan of Treatment: GI, cardiology, and hematology was following her throughout her hospital stay. GI thought that it was not indicated to do a colonoscopy/endoscopy (recently had both done 1 month ago at [facility]), and the results were normal. There was also a low suspicion for a GI bleed since you and no longer endorsing diarrhea or dark stools/bloody stools. You were treated with protonix 40mg twice daily. You will discharged on protonix 40 mg daily. Kindly follow up with your GI or PCP as outpatient within two weeks.      SECONDARY DISCHARGE DIAGNOSES  Diagnosis: MDS (myelodysplastic syndrome)  Assessment and Plan of Treatment: You have history of MDS. During your admission, you received 2 more units of blood on 7/22 due to a Hb less than 7. Platelets stayed above 50, so no additional platelet transfusion was indicated by Dr. Mosley, your outpatient hematologist. On 7/25 her Hb was 7.1, so Dr. Mosley wanted her to receive 1 unit of pRBCs, and then discharge home.    Diagnosis: Chronic atrial fibrillation  Assessment and Plan of Treatment: You have history of Atrial fibrillation. You were treated with metroprolol. You will not be on any anticoagualtion because of your bleeding. Cardiology Dr. Duval followed up.

## 2022-07-25 NOTE — DISCHARGE NOTE PROVIDER - PROVIDER TOKENS
PROVIDER:[TOKEN:[[id number]],FOLLOWUP:[2 weeks]],PROVIDER:[TOKEN:[[id number]],FOLLOWUP:[2 weeks]]

## 2022-07-25 NOTE — PROGRESS NOTE ADULT - NS ATTEND AMEND GEN_ALL_CORE FT
- Anemia.  - FOBT+.  - Dark stools, resolved.    Patient doing well. Abd soft, NTND. No new complaints. Had BM which was brown, no melena or hematochezia noted. Hb overall stable. Monitor CBC. Notify us if acute drop or overt bleeding occurs.
- Anemia.  - Dark stools, resolved.  - Thrombocytopenia.    Patient without any overt GI bleeding, no melena or hematochezia. Hb however dropped 8->6. VSS. Receiving PRBCs. F/u post transfusion CBC. Monitor for any overt GI bleeding.

## 2022-07-25 NOTE — DISCHARGE NOTE NURSING/CASE MANAGEMENT/SOCIAL WORK - PATIENT PORTAL LINK FT
You can access the FollowMyHealth Patient Portal offered by [facility] by registering at the following website: [email or web address]. By joining [employer]’s FollowMyHealth portal, you will also be able to view your health information using other applications (apps) compatible with our system.

## 2022-07-25 NOTE — DISCHARGE NOTE PROVIDER - NSDCQMSTROKE_NEU_ALL_CORE
AODA Placement Criteria    1. Withdrawal Potential:Level I: Outpatient Treatment - Not experiencing significant withdrawal or is at minimum risk of severe withdrawal.    2. Biomedical Conditions & Complications: Level I: Outpatient Treatment - None or very stable, or the patient is receiving concurrent medical monitoring.    3. Emotional, Behavioral or Cognitive Conditions & Complications: Level I: Outpatient Treatment - None or very stable, or the patient is receiving concurrent medical monitoring.    4. Readiness to Change: Level I: Outpatient Treatment - Ready for recovery but needs motivating or monitoring strategy to strengthen readiness. (Possible high severity in this dimension only- thus needs Outpatient Treatment)    5. Relapse, Continued Use, or Continued Problem Potential Recovery Environment: Level I: Outpatient Treatment - Able to maintain abstinence, control use and pursue recovery with minimal support.    6. Recovery Environment:Level I: Outpatient Treatment - Environment is supportive and/or patient has skills to cope.    Refer to: Patient declined OP therapy at this time, but may consider in the future. Resources provided for patient to consider for future OP therapy.       This information has been disclosed to you from records protected by Federal confidentiality rules (42 CFR part 2).  The Federal rules prohibit you from making any further disclosure of this information unless further disclosure is expressly permitted by the written consent of the person to whom it pertains or as otherwise permitted by 42 CFT part2.  A general authorization for the release of medical or other information is NOT sufficient for this purpose.   The Federal rules restrict any use of the information to criminally investigate or prosecute any alcohol or drug abuse patient.    
No

## 2022-07-25 NOTE — PROGRESS NOTE ADULT - SUBJECTIVE AND OBJECTIVE BOX
C A R D I O L O G Y  **********************************     DATE OF SERVICE: 07-25-22    Patient denies chest pain or shortness of breath.   Review of symptoms otherwise negative.    BACItracin   Ointment 1 Application(s) Topical two times a day PRN  hemorrhoidal Ointment 1 Application(s) Rectal two times a day  lactulose Syrup 10 Gram(s) Oral once  metoprolol tartrate 25 milliGRAM(s) Oral two times a day  pantoprazole  Injectable 40 milliGRAM(s) IV Push every 12 hours                            7.1    4.43  )-----------( x        ( 25 Jul 2022 10:15 )             22.6       Hemoglobin: 7.1 g/dL (07-25 @ 10:15)  Hemoglobin: 7.6 g/dL (07-24 @ 05:13)  Hemoglobin: 7.5 g/dL (07-23 @ 12:52)  Hemoglobin: 7.6 g/dL (07-23 @ 05:00)  Hemoglobin: 7.5 g/dL (07-23 @ 01:36)      07-25    139  |  107  |  10  ----------------------------<  133<H>  3.7   |  24  |  0.89    Ca    8.3<L>      25 Jul 2022 08:16  Phos  3.7     07-25  Mg     2.3     07-25    TPro  5.1<L>  /  Alb  2.4<L>  /  TBili  0.6  /  DBili  x   /  AST  17  /  ALT  16  /  AlkPhos  59  07-25    Creatinine Trend: 0.89<--, 0.77<--, 0.73<--, 1.01<--, 0.78<--, 0.69<--    COAGS:           T(C): 36.7 (07-25-22 @ 08:18), Max: 36.9 (07-24-22 @ 16:05)  HR: 76 (07-25-22 @ 08:18) (63 - 80)  BP: 100/52 (07-25-22 @ 08:18) (100/52 - 145/70)  RR: 18 (07-25-22 @ 08:18) (18 - 18)  SpO2: 96% (07-25-22 @ 08:18) (95% - 100%)  Wt(kg): --    I&O's Summary      Gen: Appears well in NAD  HEENT:  (-)icterus (-)pallor  CV: N S1 S2 1/6 KECIA (+)2 Pulses B/l  Resp:  Clear to ausculatation B/L, normal effort  GI: (+) BS Soft, NT, ND  Lymph:  (-)Edema, (-)obvious lymphadenopathy  Skin: Warm to touch, Normal turgor  Psych: Appropriate mood and affect      TELEMETRY: 	  sinus        ASSESSMENT/PLAN: 	77y  Female  who lives at home with bedbound , and has 2 HHA, PMH of MDS initially in remission , now mutated, and complicated by severe anemia and thrombocytopenia requiring blood transfusions, last of which was yesterday, 2 U, for Hgb of 4, dc home stable,  who comes in complaining of Melena, noted with rapid afib.    - Appears her MDS precluded use of anticoagulation in the past, now with a suspected GI bleed as well she is definitely not a candidate for anticoagulation at present  - Echo with moderate to severe MR for medical management aim for SBP <120  - Cont Lopressor 25 mg PO BID  - Heme and GI f/u  - Monitor H/H and platelets     Siddhartha Duval MD, [facility]  BEEPER [phone]

## 2022-07-25 NOTE — PROGRESS NOTE ADULT - SUBJECTIVE AND OBJECTIVE BOX
CHIEF COMPLAINT  Melena    HISTORY OF PRESENT ILLNESS  IGNACIO RAMIRES is a 77y Female who presents with a chief complaint of melena    No acute events. No complaints.    REVIEW OF SYSTEMS  A complete review of systems was performed; negative except per HPI    PHYSICAL EXAM  T(C): 36.8 (07-25-22 @ 11:44), Max: 36.9 (07-24-22 @ 16:05)  HR: 70 (07-25-22 @ 11:44) (63 - 80)  BP: 129/83 (07-25-22 @ 11:44) (100/52 - 145/70)  RR: 18 (07-25-22 @ 11:44) (18 - 18)  SpO2: 96% (07-25-22 @ 11:44) (95% - 100%)  Constitutional: alert, awake, in no acute distress  Eyes: PERRL, EOMI  HEENT: normocephalic, atraumatic  Neck: supple, non-tender  Cardiovascular: normal perfusion, no peripheral edema  Respiratory: normal respiratory efforts; no increased use of accessory muscles  Gastrointestinal: soft, non-tender  Musculoskeletal: normal range of motion, no deformities noted  Neurological: alert, CN II to XI grossly intact  Skin: warm, dry    LABORATORY DATA                        7.1    4.43  )-----------( 46       ( 25 Jul 2022 10:15 )             22.6     07-25    139  |  107  |  10  ----------------------------<  133<H>  3.7   |  24  |  0.89    Ca    8.3<L>      25 Jul 2022 08:16  Phos  3.7     07-25  Mg     2.3     07-25    TPro  5.1<L>  /  Alb  2.4<L>  /  TBili  0.6  /  DBili  x   /  AST  17  /  ALT  16  /  AlkPhos  59  07-25

## 2022-08-01 ENCOUNTER — INPATIENT (INPATIENT)
Facility: HOSPITAL | Age: 77
LOS: 1 days | Discharge: HOME | DRG: 378 | End: 2022-08-03
Attending: INTERNAL MEDICINE | Admitting: INTERNAL MEDICINE
Payer: MEDICARE

## 2022-08-01 VITALS
OXYGEN SATURATION: 96 % | HEART RATE: 66 BPM | DIASTOLIC BLOOD PRESSURE: 39 MMHG | WEIGHT: 126.99 LBS | HEIGHT: 64 IN | SYSTOLIC BLOOD PRESSURE: 101 MMHG | RESPIRATION RATE: 18 BRPM | TEMPERATURE: 98 F

## 2022-08-01 DIAGNOSIS — D64.9 ANEMIA, UNSPECIFIED: ICD-10-CM

## 2022-08-01 DIAGNOSIS — D46.9 MYELODYSPLASTIC SYNDROME, UNSPECIFIED: ICD-10-CM

## 2022-08-01 DIAGNOSIS — I48.0 PAROXYSMAL ATRIAL FIBRILLATION: ICD-10-CM

## 2022-08-01 DIAGNOSIS — Z29.9 ENCOUNTER FOR PROPHYLACTIC MEASURES, UNSPECIFIED: ICD-10-CM

## 2022-08-01 DIAGNOSIS — K92.2 GASTROINTESTINAL HEMORRHAGE, UNSPECIFIED: ICD-10-CM

## 2022-08-01 DIAGNOSIS — I10 ESSENTIAL (PRIMARY) HYPERTENSION: ICD-10-CM

## 2022-08-01 DIAGNOSIS — D61.818 OTHER PANCYTOPENIA: ICD-10-CM

## 2022-08-01 DIAGNOSIS — D62 ACUTE POSTHEMORRHAGIC ANEMIA: ICD-10-CM

## 2022-08-01 DIAGNOSIS — D69.6 THROMBOCYTOPENIA, UNSPECIFIED: ICD-10-CM

## 2022-08-01 LAB
ALBUMIN SERPL ELPH-MCNC: 2 G/DL — LOW (ref 3.5–5)
ALP SERPL-CCNC: 40 U/L — SIGNIFICANT CHANGE UP (ref 40–120)
ALT FLD-CCNC: 12 U/L DA — SIGNIFICANT CHANGE UP (ref 10–60)
ANION GAP SERPL CALC-SCNC: 16 MMOL/L — SIGNIFICANT CHANGE UP (ref 5–17)
APTT BLD: 21.3 SEC — LOW (ref 27.5–35.5)
AST SERPL-CCNC: 13 U/L — SIGNIFICANT CHANGE UP (ref 10–40)
BASOPHILS # BLD AUTO: 0.16 K/UL — SIGNIFICANT CHANGE UP (ref 0–0.2)
BASOPHILS NFR BLD AUTO: 2 % — SIGNIFICANT CHANGE UP (ref 0–2)
BILIRUB SERPL-MCNC: 0.4 MG/DL — SIGNIFICANT CHANGE UP (ref 0.2–1.2)
BUN SERPL-MCNC: 31 MG/DL — HIGH (ref 7–18)
CALCIUM SERPL-MCNC: 8.2 MG/DL — LOW (ref 8.4–10.5)
CHLORIDE SERPL-SCNC: 103 MMOL/L — SIGNIFICANT CHANGE UP (ref 96–108)
CO2 SERPL-SCNC: 21 MMOL/L — LOW (ref 22–31)
CREAT SERPL-MCNC: 1.02 MG/DL — SIGNIFICANT CHANGE UP (ref 0.5–1.3)
EGFR: 57 ML/MIN/1.73M2 — LOW
EOSINOPHIL # BLD AUTO: 0 K/UL — SIGNIFICANT CHANGE UP (ref 0–0.5)
EOSINOPHIL NFR BLD AUTO: 0 % — SIGNIFICANT CHANGE UP (ref 0–6)
GLUCOSE SERPL-MCNC: 122 MG/DL — HIGH (ref 70–99)
HCT VFR BLD CALC: 13.9 % — CRITICAL LOW (ref 34.5–45)
HGB BLD-MCNC: 4.5 G/DL — CRITICAL LOW (ref 11.5–15.5)
INR BLD: 1.07 RATIO — SIGNIFICANT CHANGE UP (ref 0.88–1.16)
LYMPHOCYTES # BLD AUTO: 1.02 K/UL — SIGNIFICANT CHANGE UP (ref 1–3.3)
LYMPHOCYTES # BLD AUTO: 13 % — SIGNIFICANT CHANGE UP (ref 13–44)
MCHC RBC-ENTMCNC: 31.5 PG — SIGNIFICANT CHANGE UP (ref 27–34)
MCHC RBC-ENTMCNC: 32.4 GM/DL — SIGNIFICANT CHANGE UP (ref 32–36)
MCV RBC AUTO: 97.2 FL — SIGNIFICANT CHANGE UP (ref 80–100)
MONOCYTES # BLD AUTO: 0.47 K/UL — SIGNIFICANT CHANGE UP (ref 0–0.9)
MONOCYTES NFR BLD AUTO: 6 % — SIGNIFICANT CHANGE UP (ref 2–14)
NEUTROPHILS # BLD AUTO: 5.99 K/UL — SIGNIFICANT CHANGE UP (ref 1.8–7.4)
NEUTROPHILS NFR BLD AUTO: 76 % — SIGNIFICANT CHANGE UP (ref 43–77)
OB PNL STL: POSITIVE
PLATELET # BLD AUTO: 8 K/UL — CRITICAL LOW (ref 150–400)
POTASSIUM SERPL-MCNC: 3.6 MMOL/L — SIGNIFICANT CHANGE UP (ref 3.5–5.3)
POTASSIUM SERPL-SCNC: 3.6 MMOL/L — SIGNIFICANT CHANGE UP (ref 3.5–5.3)
PROT SERPL-MCNC: 4.5 G/DL — LOW (ref 6–8.3)
PROTHROM AB SERPL-ACNC: 12.8 SEC — SIGNIFICANT CHANGE UP (ref 10.5–13.4)
RBC # BLD: 1.43 M/UL — LOW (ref 3.8–5.2)
RBC # FLD: 16.6 % — HIGH (ref 10.3–14.5)
SARS-COV-2 RNA SPEC QL NAA+PROBE: SIGNIFICANT CHANGE UP
SODIUM SERPL-SCNC: 140 MMOL/L — SIGNIFICANT CHANGE UP (ref 135–145)
TROPONIN I, HIGH SENSITIVITY RESULT: 20.8 NG/L — SIGNIFICANT CHANGE UP
WBC # BLD: 7.88 K/UL — SIGNIFICANT CHANGE UP (ref 3.8–10.5)
WBC # FLD AUTO: 7.88 K/UL — SIGNIFICANT CHANGE UP (ref 3.8–10.5)

## 2022-08-01 PROCEDURE — 99285 EMERGENCY DEPT VISIT HI MDM: CPT

## 2022-08-01 RX ORDER — ACETAMINOPHEN 500 MG
650 TABLET ORAL EVERY 6 HOURS
Refills: 0 | Status: DISCONTINUED | OUTPATIENT
Start: 2022-08-01 | End: 2022-08-03

## 2022-08-01 RX ORDER — HYDROMORPHONE HYDROCHLORIDE 2 MG/ML
0.2 INJECTION INTRAMUSCULAR; INTRAVENOUS; SUBCUTANEOUS ONCE
Refills: 0 | Status: DISCONTINUED | OUTPATIENT
Start: 2022-08-01 | End: 2022-08-01

## 2022-08-01 RX ORDER — LANOLIN ALCOHOL/MO/W.PET/CERES
3 CREAM (GRAM) TOPICAL AT BEDTIME
Refills: 0 | Status: DISCONTINUED | OUTPATIENT
Start: 2022-08-01 | End: 2022-08-03

## 2022-08-01 RX ORDER — CARVEDILOL PHOSPHATE 80 MG/1
1 CAPSULE, EXTENDED RELEASE ORAL
Qty: 0 | Refills: 0 | DISCHARGE

## 2022-08-01 RX ORDER — ONDANSETRON 8 MG/1
4 TABLET, FILM COATED ORAL EVERY 8 HOURS
Refills: 0 | Status: DISCONTINUED | OUTPATIENT
Start: 2022-08-01 | End: 2022-08-03

## 2022-08-01 RX ORDER — PANTOPRAZOLE SODIUM 20 MG/1
80 TABLET, DELAYED RELEASE ORAL ONCE
Refills: 0 | Status: COMPLETED | OUTPATIENT
Start: 2022-08-01 | End: 2022-08-01

## 2022-08-01 RX ORDER — SODIUM CHLORIDE 9 MG/ML
1000 INJECTION INTRAMUSCULAR; INTRAVENOUS; SUBCUTANEOUS ONCE
Refills: 0 | Status: COMPLETED | OUTPATIENT
Start: 2022-08-01 | End: 2022-08-01

## 2022-08-01 RX ORDER — ACETAMINOPHEN 500 MG
1000 TABLET ORAL ONCE
Refills: 0 | Status: COMPLETED | OUTPATIENT
Start: 2022-08-01 | End: 2022-08-01

## 2022-08-01 RX ORDER — AMLODIPINE BESYLATE 2.5 MG/1
1 TABLET ORAL
Qty: 0 | Refills: 0 | DISCHARGE

## 2022-08-01 RX ORDER — CARVEDILOL PHOSPHATE 80 MG/1
25 CAPSULE, EXTENDED RELEASE ORAL EVERY 12 HOURS
Refills: 0 | Status: DISCONTINUED | OUTPATIENT
Start: 2022-08-01 | End: 2022-08-01

## 2022-08-01 RX ORDER — AMLODIPINE BESYLATE 2.5 MG/1
10 TABLET ORAL ONCE
Refills: 0 | Status: DISCONTINUED | OUTPATIENT
Start: 2022-08-01 | End: 2022-08-01

## 2022-08-01 RX ORDER — PANTOPRAZOLE SODIUM 20 MG/1
40 TABLET, DELAYED RELEASE ORAL
Refills: 0 | Status: DISCONTINUED | OUTPATIENT
Start: 2022-08-01 | End: 2022-08-03

## 2022-08-01 RX ORDER — PANTOPRAZOLE SODIUM 20 MG/1
40 TABLET, DELAYED RELEASE ORAL
Refills: 0 | Status: DISCONTINUED | OUTPATIENT
Start: 2022-08-01 | End: 2022-08-01

## 2022-08-01 RX ADMIN — Medication 400 MILLIGRAM(S): at 17:18

## 2022-08-01 RX ADMIN — PANTOPRAZOLE SODIUM 80 MILLIGRAM(S): 20 TABLET, DELAYED RELEASE ORAL at 19:38

## 2022-08-01 RX ADMIN — SODIUM CHLORIDE 1000 MILLILITER(S): 9 INJECTION INTRAMUSCULAR; INTRAVENOUS; SUBCUTANEOUS at 19:42

## 2022-08-01 RX ADMIN — Medication 1000 MILLIGRAM(S): at 18:34

## 2022-08-01 NOTE — ED PROVIDER NOTE - OBJECTIVE STATEMENT
78 y/o female, history of myelodysplastic syndrome, requires transfusions, she is coming in w/ feeling weak and pale appearance. No chest pain or shortness of breath, but is very fatigued. Her stools have been brown and she doesn't have any dark or tarry stools. She is not vomiting. She has no fevers or chills or URI symptoms. She has not had a bowel movement today and feels likes she has to. No known drug allergies.

## 2022-08-01 NOTE — H&P ADULT - PROBLEM SELECTOR PLAN 1
- npo  - IV Protonix drip  - Monitor Hemoglobin (keep >7)  - Plan EGD to evaluate source of bleeding (with therapy)  - Monitor vital signs and hemodynamics - P/w Hgb 4.5, Plt 9 in the setting of MDS and GI bleed  - Acute 1 day onset of weakness  - Had black stool in ED, FOBT positive today  - Ordered 1u of platelets  - Ordered  2u of pRBC  - IV PPI BID  - Will keep NPO for possible EGD  - Transfuse if Hgb < 7 and Plt < 20 since pt may be bleeding  - F/u CBC post transfusion  - GI Dr. Rajani monzon - P/w Hgb 4.5, Plt 9 in the setting of MDS and GI bleed  - Acute 1 day onset of weakness  - Had black stool in ED, FOBT positive today  - Ordered 1u of platelets  - Ordered  2u of pRBC  - IV PPI BID  - Clear liquid diet  - Transfuse if Hgb < 7 and Plt < 20 since pt may be bleeding  - F/u CBC post transfusion - P/w Hgb 4.5, Plt 8 in the setting of MDS and GI bleed  - Acute 1 day onset of weakness  - Had black stool in ED, FOBT positive today  - Ordered 1u of platelets  - Ordered  2u of pRBC  - IV PPI BID  - Clear liquid diet  - Transfuse if Hgb < 7 and Plt < 20 since pt may be bleeding  - F/u CBC post transfusion

## 2022-08-01 NOTE — H&P ADULT - PROBLEM SELECTOR PLAN 6
- P/w BP 92/50 today  - Patient's Metoprolol was stopped by cardiologist due to HTN, was resumed on previous BP meds again  - Will hold BP meds in the setting of GI bleed

## 2022-08-01 NOTE — H&P ADULT - ASSESSMENT
Patient is a 76 yo female who lives at home with bedbound , and has 2 HHA, PMH of MDS initially in remission , now mutated, and complicated by severe anemia and thrombocytopenia requiring blood transfusions, last of which was last week during her hospital admission here now presenting for extreme fatigue and weakness concerning for recurrent acute GI Bleed.    Ddx    PLAN:   Patient is a 78 yo female who lives at home, is ambulatory, and lives with a bedbound , and has 1 HHA, PMH of MDS initially in remission, now mutated, and complicated by severe anemia and thrombocytopenia requiring blood transfusions, last of which was last week during her hospital admission here now presenting for extreme fatigue and weakness concerning for acute GI Bleed.

## 2022-08-01 NOTE — H&P ADULT - NSHPREVIEWOFSYSTEMS_GEN_ALL_CORE
CONSTITUTIONAL: No fever, weight loss, +++fatigue  RESPIRATORY: No cough, wheezing, chills or hemoptysis; No shortness of breath  CARDIOVASCULAR: No chest pain, palpitations, dizziness, or leg swelling  GASTROINTESTINAL: No abdominal pain. No nausea, vomiting, or hematemesis; No diarrhea or constipation. +++melena or hematochezia.  GENITOURINARY: No dysuria or hematuria, urinary frequency  NEUROLOGICAL: No headaches, memory loss, loss of strength, numbness, or tremors  ENDOCRINE: No polyuria, polydipsia, or heat/cold intolerance  MUSKULOSKELETAL: No muscle aches, joint pains  HEME: no easy bruisability, no tender or enlarged lymph nodes  SKIN: No itching, burning, rashes, or lesions. Many ecchymosis

## 2022-08-01 NOTE — ED PROVIDER NOTE - NSICDXPASTMEDICALHX_GEN_ALL_CORE_FT
PAST MEDICAL HISTORY:  Anemia     Hypertension     Myelodysplastic syndrome     Pancytopenia       Anemia requiring transfusions

## 2022-08-01 NOTE — H&P ADULT - HISTORY OF PRESENT ILLNESS
Patient is a 78 yo female who lives at home with bedbound , and has 2 HHA, PMH of MDS initially in remission , now mutated, and complicated by severe anemia and thrombocytopenia requiring blood transfusions, last of which was last week during her hospital admission here now presenting for extreme fatigue and weakness. She was going up stairs when she suddenly felt very weak and had to sit down. Her daughter called a neighbor who helped carry her to sit before she arrived at the hospital. She denies any new chest pain, SOB, abdominal pain, nausea, vomiting and diarrhea. She says her bowel movements have been brown; no blood while at home.     During her last admission, pt was hemodynamically stable, afebrile. She received 2 more units of blood on 7/22 due to a Hb <7. Platelets stayed above 50, so no additional platelet transfusion was indicated by Dr. Mosley, her hematologist. GI, cardiology, and hematology was following her throughout her hospital stay. GI thought that it was not indicated to do a colonoscopy/endoscopy (recently had both done 1 month ago at [facility]), and the results were normal. There was also a low suspicion for a GI bleed since the pt. was symptomatically improving and no longer endorsing diarrhea or dark stools/bloody stools. They recommended PPI BID and close monitoring of H/H. On 7/25 her Hb was 7.1, so Dr. Mosley wanted her to receive 1 unit of pRBCs, and then discharge home. Her afib with RVR was treated with metroprolol.     Since then, she was told to stop her metoprolol and go back to her regimen of Amlodipine 10mg and Carvedilol 25 mg, of which she started today. She is scheduled to have a follow up appointment with her hematologist at [facility] this Thursday 8/4.    In the ED, she had a bowel movement which was black, FOBT was found to be positive. CBC was notable for Hgb of 4.5.     Patient is a 78 yo female who lives at home, is ambulatory, and lives with a bedbound , and has 1 HHA, PMH of MDS initially in remission, now mutated, and complicated by severe anemia and thrombocytopenia requiring blood transfusions, last of which was last week during her hospital admission here now presenting for extreme fatigue and weakness. She was going up stairs when she suddenly felt very weak and had to sit down. Her daughter called a neighbor who helped carry her to sit before she arrived at the hospital. She denies any new chest pain, SOB, abdominal pain, nausea, vomiting and diarrhea. She says her bowel movements have been brown; no blood while at home.     During her last admission, pt was hemodynamically stable, afebrile. She received 2 more units of blood on 7/22 due to a Hb <7. Platelets stayed above 50, so no additional platelet transfusion was indicated by Dr. Mosley, her hematologist. GI, cardiology, and hematology was following her throughout her hospital stay. GI thought that it was not indicated to do a colonoscopy/endoscopy (recently had both done 1 month ago at [facility]), and the results were normal. There was also a low suspicion for a GI bleed since the pt. was symptomatically improving and no longer endorsing diarrhea or dark stools/bloody stools. They recommended PPI BID and close monitoring of H/H. On 7/25 her Hb was 7.1, so Dr. Mosley wanted her to receive 1 unit of pRBCs, and then discharge home. Her afib with RVR was treated with metroprolol.     Since then, she was told to stop her metoprolol and go back to her regimen of Amlodipine 10mg and Carvedilol 25 mg, of which she started today. She is scheduled to have a follow up appointment with her hematologist at [facility] this Thursday 8/4.    In the ED, she had a bowel movement which was black, FOBT was found to be positive. CBC was notable for Hgb of 4.5.

## 2022-08-01 NOTE — H&P ADULT - PROBLEM SELECTOR PLAN 4
Monitor BP and add home regimen as tolerated - Was on remission at one point, however now it is complicated by anemia requiring frequent transfusions  - About 6 months ago, patient started needing frequent transfusions  - Follow Dr. Mosley, who is referring them for evaluation at [facility]  - Heme/onc Dr. Mosley consulted

## 2022-08-01 NOTE — ED ADULT NURSE NOTE - OBJECTIVE STATEMENT
pt is here for increasing weakness and dizziness ,as per pt's daughter her mom needs a blood transfusion , pt has pale appearance, pt is c/o abdominal discomfort and " GAS ", pt has a skin tear to the left forearm

## 2022-08-01 NOTE — H&P ADULT - PROBLEM SELECTOR PLAN 5
GI IV Protonix  SCD Boots for DVT Prophylaxis - New onset in early July 2022 at [facility], was likely due to profound anemia  - Not on AC due to low Hgb and bleeding  - Rate controlled today at 83 in ED  - F/u EKG  - Patient's Metoprolol was stopped by outpt cardiologist due to HTN, was resumed on previous BP meds again  - Admit to telemetry  - Cardio Dr. Duval consulted - New onset in early July 2022 at [facility], was likely due to profound anemia  - Not on AC due to low Hgb and bleeding  - Rate controlled today at 83 in ED  - F/u EKG  - Patient's Metoprolol was stopped by outpt cardiologist due to HTN, was resumed on previous BP meds again  - Admit to telemetry

## 2022-08-01 NOTE — ED ADULT TRIAGE NOTE - CHIEF COMPLAINT QUOTE
pt is here for dizziness and lightheaded as per daughter " she needs a blood transfusion ", pale appearances

## 2022-08-01 NOTE — ED PROVIDER NOTE - CLINICAL SUMMARY MEDICAL DECISION MAKING FREE TEXT BOX
Blood work. Transfuse to 7. Is not on any aspirin or anticoagulation. Unlikely to be GI bleeding given that she was recently worked up and they did not think it was that.

## 2022-08-01 NOTE — H&P ADULT - NSHPSOCIALHISTORY_GEN_ALL_CORE
Lives alone with ; bedbound, has two home health aides that come assist her . Daughter is working on getting patient a home health aide as well. Lives alone with  who is bedbound, has two home health aides that come assist her . Daughter is working on getting patient a home health aide as well.

## 2022-08-01 NOTE — ED PROVIDER NOTE - NS ED MD DISPO DIVISION
Eye Protection Verbiage: Before proceeding with the stage, a plastic scleral shield was inserted. The globe was anesthetized with a few drops of 1% lidocaine with 1:100,000 epinephrine. Then, an appropriate sized scleral shield was chosen and coated with lacrilube ointment. The shield was gently inserted and left in place for the duration of each stage. After the stage was completed, the shield was gently removed. [facility]

## 2022-08-01 NOTE — H&P ADULT - NSHPPHYSICALEXAM_GEN_ALL_CORE
GENERAL: Elderly, frail appearing  HEAD:  Atraumatic, Normocephalic  EYES: EOMI, PERRLA, conjunctiva and sclera clear  ENMT: No tonsillar erythema, exudates, or enlargement; Moist mucous membranes, Good dentition, No lesions  NECK: Supple, normal appearance, No JVD  NERVOUS SYSTEM:  Alert & Oriented X3,  Motor Strength 5/5 B/L upper and lower extremities, sensation intact  CHEST/LUNG: Lungs clear to auscultation bilaterally, No rales, rhonchi, wheezing   HEART: Holosystolic murmur   ABDOMEN: Soft, Nontender, Nondistended; Bowel sounds present  EXTREMITIES:  2+ Peripheral Pulses, No clubbing, cyanosis, or edema  LYMPH: No lymphadenopathy noted  SKIN: Many ehymosis on arms; bandage on prior IV site from last week that has not healed GENERAL: Elderly, frail appearing, pale  HEAD:  Atraumatic, Normocephalic  EYES: EOMI, PERRLA, conjunctiva and sclera clear  ENMT: No tonsillar erythema, exudates, or enlargement; Moist mucous membranes, Good dentition, No lesions  NECK: Supple, normal appearance, No JVD  NERVOUS SYSTEM:  Alert & Oriented X3,  Motor Strength 5/5 B/L upper and lower extremities, sensation intact  CHEST/LUNG: Lungs clear to auscultation bilaterally, No rales, rhonchi, wheezing   HEART: Holosystolic murmur   ABDOMEN: Soft, Nontender, Nondistended; Bowel sounds present  EXTREMITIES:  2+ Peripheral Pulses, No clubbing, cyanosis, or edema  LYMPH: No lymphadenopathy noted  SKIN: Many ecchymoses on arms; bandage on prior IV site from last week that has not healed

## 2022-08-02 DIAGNOSIS — D64.9 ANEMIA, UNSPECIFIED: ICD-10-CM

## 2022-08-02 LAB
ALBUMIN SERPL ELPH-MCNC: 2.5 G/DL — LOW (ref 3.5–5)
ALP SERPL-CCNC: 47 U/L — SIGNIFICANT CHANGE UP (ref 40–120)
ALT FLD-CCNC: 17 U/L DA — SIGNIFICANT CHANGE UP (ref 10–60)
ANION GAP SERPL CALC-SCNC: 9 MMOL/L — SIGNIFICANT CHANGE UP (ref 5–17)
ANISOCYTOSIS BLD QL: SLIGHT — SIGNIFICANT CHANGE UP
AST SERPL-CCNC: 21 U/L — SIGNIFICANT CHANGE UP (ref 10–40)
BILIRUB SERPL-MCNC: 0.7 MG/DL — SIGNIFICANT CHANGE UP (ref 0.2–1.2)
BUN SERPL-MCNC: 24 MG/DL — HIGH (ref 7–18)
CALCIUM SERPL-MCNC: 8 MG/DL — LOW (ref 8.4–10.5)
CHLORIDE SERPL-SCNC: 106 MMOL/L — SIGNIFICANT CHANGE UP (ref 96–108)
CO2 SERPL-SCNC: 26 MMOL/L — SIGNIFICANT CHANGE UP (ref 22–31)
CREAT SERPL-MCNC: 0.66 MG/DL — SIGNIFICANT CHANGE UP (ref 0.5–1.3)
EGFR: 91 ML/MIN/1.73M2 — SIGNIFICANT CHANGE UP
GLUCOSE BLDC GLUCOMTR-MCNC: 124 MG/DL — HIGH (ref 70–99)
GLUCOSE SERPL-MCNC: 97 MG/DL — SIGNIFICANT CHANGE UP (ref 70–99)
HCT VFR BLD CALC: 19.2 % — CRITICAL LOW (ref 34.5–45)
HCT VFR BLD CALC: 20.5 % — CRITICAL LOW (ref 34.5–45)
HCT VFR BLD CALC: 25.5 % — LOW (ref 34.5–45)
HGB BLD-MCNC: 6.5 G/DL — CRITICAL LOW (ref 11.5–15.5)
HGB BLD-MCNC: 7 G/DL — CRITICAL LOW (ref 11.5–15.5)
HGB BLD-MCNC: 8.6 G/DL — LOW (ref 11.5–15.5)
MAGNESIUM SERPL-MCNC: 2 MG/DL — SIGNIFICANT CHANGE UP (ref 1.6–2.6)
MANUAL SMEAR VERIFICATION: SIGNIFICANT CHANGE UP
MCHC RBC-ENTMCNC: 30 PG — SIGNIFICANT CHANGE UP (ref 27–34)
MCHC RBC-ENTMCNC: 30.5 PG — SIGNIFICANT CHANGE UP (ref 27–34)
MCHC RBC-ENTMCNC: 31.5 PG — SIGNIFICANT CHANGE UP (ref 27–34)
MCHC RBC-ENTMCNC: 33.7 GM/DL — SIGNIFICANT CHANGE UP (ref 32–36)
MCHC RBC-ENTMCNC: 33.9 GM/DL — SIGNIFICANT CHANGE UP (ref 32–36)
MCHC RBC-ENTMCNC: 34.1 GM/DL — SIGNIFICANT CHANGE UP (ref 32–36)
MCV RBC AUTO: 88.9 FL — SIGNIFICANT CHANGE UP (ref 80–100)
MCV RBC AUTO: 90.1 FL — SIGNIFICANT CHANGE UP (ref 80–100)
MCV RBC AUTO: 92.3 FL — SIGNIFICANT CHANGE UP (ref 80–100)
NRBC # BLD: 0 /100 WBCS — SIGNIFICANT CHANGE UP (ref 0–0)
OVALOCYTES BLD QL SMEAR: SLIGHT — SIGNIFICANT CHANGE UP
PHOSPHATE SERPL-MCNC: 2.4 MG/DL — LOW (ref 2.5–4.5)
PLAT MORPH BLD: NORMAL — SIGNIFICANT CHANGE UP
PLATELET # BLD AUTO: 46 K/UL — LOW (ref 150–400)
PLATELET # BLD AUTO: 54 K/UL — LOW (ref 150–400)
PLATELET # BLD AUTO: 54 K/UL — LOW (ref 150–400)
PLATELET COUNT - ESTIMATE: ABNORMAL
POIKILOCYTOSIS BLD QL AUTO: SLIGHT — SIGNIFICANT CHANGE UP
POTASSIUM SERPL-MCNC: 3 MMOL/L — LOW (ref 3.5–5.3)
POTASSIUM SERPL-SCNC: 3 MMOL/L — LOW (ref 3.5–5.3)
PROT SERPL-MCNC: 5.1 G/DL — LOW (ref 6–8.3)
RBC # BLD: 2.13 M/UL — LOW (ref 3.8–5.2)
RBC # BLD: 2.22 M/UL — LOW (ref 3.8–5.2)
RBC # BLD: 2.87 M/UL — LOW (ref 3.8–5.2)
RBC # FLD: 15.7 % — HIGH (ref 10.3–14.5)
RBC # FLD: 16.7 % — HIGH (ref 10.3–14.5)
RBC # FLD: 16.8 % — HIGH (ref 10.3–14.5)
RBC BLD AUTO: ABNORMAL
SODIUM SERPL-SCNC: 141 MMOL/L — SIGNIFICANT CHANGE UP (ref 135–145)
WBC # BLD: 5.14 K/UL — SIGNIFICANT CHANGE UP (ref 3.8–10.5)
WBC # BLD: 5.78 K/UL — SIGNIFICANT CHANGE UP (ref 3.8–10.5)
WBC # BLD: 6.89 K/UL — SIGNIFICANT CHANGE UP (ref 3.8–10.5)
WBC # FLD AUTO: 5.14 K/UL — SIGNIFICANT CHANGE UP (ref 3.8–10.5)
WBC # FLD AUTO: 5.78 K/UL — SIGNIFICANT CHANGE UP (ref 3.8–10.5)
WBC # FLD AUTO: 6.89 K/UL — SIGNIFICANT CHANGE UP (ref 3.8–10.5)

## 2022-08-02 PROCEDURE — 99222 1ST HOSP IP/OBS MODERATE 55: CPT

## 2022-08-02 RX ADMIN — PANTOPRAZOLE SODIUM 40 MILLIGRAM(S): 20 TABLET, DELAYED RELEASE ORAL at 19:11

## 2022-08-02 RX ADMIN — PANTOPRAZOLE SODIUM 40 MILLIGRAM(S): 20 TABLET, DELAYED RELEASE ORAL at 05:07

## 2022-08-02 NOTE — CONSULT NOTE ADULT - PROBLEM SELECTOR RECOMMENDATION 9
-P/W worsening fatigue and weakness, Hg 4.5 on admission, anemia likely in setting of Myelodysplastic syndrome   -s/p 2 PRBC and 1 platelet transfusion   -+ve FOBT ? due to severe thrombocytopenia   -Trend CBC   -Continue PPI BID   -Transfuse PRN for Hg <7  -Last EGD and colonoscopy in April at [facility] with normal findings as per pt -P/W worsening fatigue and weakness, Hg 4.5 on admission, anemia likely in setting of Myelodysplastic syndrome given severe thrombocytopenia in addition to anemia  -s/p 2 PRBC and 1 platelet transfusion   -+ve FOBT   -Trend CBC   -Continue PPI   -Advance diet as tolerated    -Transfuse PRN for Hg <7  -Patient reports having an endoscopy and colonoscopy in April at [facility] with normal results. Pt also had a capsule endoscopy done about 2 months ago which was also unremarkable as per pt  -No acute sign ot symptoms of GI bleed, please obtain records for outpt EGD/Colonoscopy -P/W worsening fatigue and weakness, Hg 4.5 on admission, anemia likely in setting of Myelodysplastic syndrome given severe thrombocytopenia in addition to anemia  -s/p 2 PRBC and 1 platelet transfusion   -+ve FOBT, given reports of dark stools, plan for EGD tomorrow  -NPO after midnight   -if platelet count <50 tomorrow, pt will require platelet transfusion prior to EGD  -Trend CBC   -Continue PPI   -Advance diet as tolerated  -COVID negative 8/1  -Transfuse PRN for Hg <7  -Patient reports having an endoscopy and colonoscopy in April at [facility] with normal results. Pt also had a capsule endoscopy done about 2 months ago which was also unremarkable as per pt  -please obtain records for outpt EGD/Colonoscopy

## 2022-08-02 NOTE — CONSULT NOTE ADULT - NS ATTEND AMEND GEN_ALL_CORE FT
- Anemia.  - Melena.  - Thrombocytopenia.  - GI bleed.    Patient seen and examined. Presenting with recurring symptomatic anemia with black stools today. VSS. Suspect GI bleeding precipitated by severe thrombocytopenia from her underlying MDS (plts 8 on admission). Obtaining recent endoscopic reports however given melena and recurring anemia, will plan for repeat EGD/push enteroscopy tomorrow. NPO after midnight.

## 2022-08-02 NOTE — CONSULT NOTE ADULT - SUBJECTIVE AND OBJECTIVE BOX
[facility]  Patient is a 77y old  Female who presents with a chief complaint of Weakness (02 Aug 2022 11:19)    HPI:  Patient is a 76 yo female who lives at home, is ambulatory, and lives with a bedbound , and has 1 HHA, PMH of MDS initially in remission, now mutated, and complicated by severe anemia and thrombocytopenia requiring blood transfusions, last of which was last week during her hospital admission here now presenting for extreme fatigue and weakness. Pt was recently admitted for similar complaints and was received blood transfusions and followed by GI.   On this admission Hg was 4.5 and plt of 8, pt received 2 PRBC and 1 unit of platelets, +ve FOBT, GI consulted for further evaluation     GI HPI:   Pt awake and alert ambulating in the room, reported worsening weakness at home with poor appetite. Denies abdominal pain, nausea, vomiting, hematemesis, melena or hematochezia.   Patient reports having an endoscopy and colonoscopy in April at [facility] with normal results.     PMH/PSH:  PAST MEDICAL & SURGICAL HISTORY:  Hypertension      Anemia      Myelodysplastic syndrome      Pancytopenia      Anemia requiring transfusions      No significant past surgical history        FH:  FAMILY HISTORY:  No pertinent family history in first degree relatives        Social History:       MEDS:  MEDICATIONS  (STANDING):  pantoprazole  Injectable 40 milliGRAM(s) IV Push two times a day    MEDICATIONS  (PRN):  acetaminophen     Tablet .. 650 milliGRAM(s) Oral every 6 hours PRN Temp greater or equal to 38C (100.4F), Mild Pain (1 - 3), Moderate Pain (4 - 6)  aluminum hydroxide/magnesium hydroxide/simethicone Suspension 30 milliLiter(s) Oral every 4 hours PRN Dyspepsia  melatonin 3 milliGRAM(s) Oral at bedtime PRN Insomnia  ondansetron Injectable 4 milliGRAM(s) IV Push every 8 hours PRN Nausea and/or Vomiting    Allergies    penicillin (Hives)    Intolerances    CONSTITUTIONAL:  No weight loss, fever, chills, weakness or fatigue.  HEENT:  Eyes:  No visual loss, blurred vision, double vision or yellow sclerae. Ears, Nose, Throat:  No hearing loss, sneezing, congestion, runny nose or sore throat.  SKIN:  No rash or itching.  CARDIOVASCULAR:  No chest pain, chest pressure or chest discomfort. No palpitations or edema.  RESPIRATORY:  No shortness of breath, cough or sputum.  GASTROINTESTINAL:  SEE HPI  GENITOURINARY:  No dysuria, hematuria, urinary frequency  NEUROLOGICAL:  No headache, dizziness, syncope, paralysis, ataxia, numbness or tingling in the extremities. No change in bowel or bladder control.  MUSCULOSKELETAL:  No muscle, back pain, joint pain or stiffness.  HEMATOLOGIC:  No anemia, bleeding or bruising.      ______________________________________________________________________  PHYSICAL EXAM:  T(C): 36.8 (08-02-22 @ 11:11), Max: 37.1 (08-02-22 @ 01:28)  HR: 70 (08-02-22 @ 11:11)  BP: 120/68 (08-02-22 @ 11:11)  RR: 18 (08-02-22 @ 11:11)  SpO2: 98% (08-02-22 @ 11:11)  Wt(kg): --    08-02 - 08-02  --------------------------------------------------------  IN:    Oral Fluid: 450 mL  Total IN: 450 mL    OUT:  Total OUT: 0 mL    Total NET: 450 mL          GEN: NAD, normocephalic  CVS: S1S2+  CHEST: clear to auscultation  ABD: soft , nontender, nondistended, bowel sounds present  EXTR: no cyanosis, no clubbing, no edema  NEURO: Awake and alert; oriented x3   SKIN:  warm;  non icteric    ______________________________________________________________________  LABS:                        6.5    6.89  )-----------( 54       ( 02 Aug 2022 10:12 )             19.2     08-02    141  |  106  |  24<H>  ----------------------------<  97  3.0<L>   |  26  |  0.66    Ca    8.0<L>      02 Aug 2022 05:40  Phos  2.4     08-02  Mg     2.0     08-02    TPro  5.1<L>  /  Alb  2.5<L>  /  TBili  0.7  /  DBili  x   /  AST  21  /  ALT  17  /  AlkPhos  47  08-02    LIVER FUNCTIONS - ( 02 Aug 2022 05:40 )  Alb: 2.5 g/dL / Pro: 5.1 g/dL / ALK PHOS: 47 U/L / ALT: 17 U/L DA / AST: 21 U/L / GGT: x           PT/INR - ( 01 Aug 2022 16:20 )   PT: 12.8 sec;   INR: 1.07 ratio         PTT - ( 01 Aug 2022 16:20 )  PTT:21.3 sec  ____________________________________________    IMAGING:              [facility]  Patient is a 77y old  Female who presents with a chief complaint of Weakness (02 Aug 2022 11:19)    HPI:  Patient is a 76 yo female who lives at home, is ambulatory, and lives with a bedbound , and has 1 HHA, PMH of MDS initially in remission, now mutated, and complicated by severe anemia and thrombocytopenia requiring blood transfusions, last of which was last week during her hospital admission here now presenting for extreme fatigue and weakness. Pt was recently admitted for similar complaints and was received blood transfusions and followed by GI.   On this admission Hg was 4.5 and plt of 8, pt received 2 PRBC and 1 unit of platelets, +ve FOBT, GI consulted for further evaluation     GI HPI:   Pt awake and alert ambulating in the room, reported worsening weakness at home with poor appetite. Denies abdominal pain, nausea, vomiting, hematemesis, melena or hematochezia.   Patient reports having an endoscopy and colonoscopy in April at [facility] with normal results. Pt also had a capsule endoscopy done about 2 months ago which was also unremarkable     PMH/PSH:  PAST MEDICAL & SURGICAL HISTORY:  Hypertension      Anemia      Myelodysplastic syndrome      Pancytopenia      Anemia requiring transfusions      No significant past surgical history        FH:  FAMILY HISTORY:  No pertinent family history in first degree relatives        Social History:       MEDS:  MEDICATIONS  (STANDING):  pantoprazole  Injectable 40 milliGRAM(s) IV Push two times a day    MEDICATIONS  (PRN):  acetaminophen     Tablet .. 650 milliGRAM(s) Oral every 6 hours PRN Temp greater or equal to 38C (100.4F), Mild Pain (1 - 3), Moderate Pain (4 - 6)  aluminum hydroxide/magnesium hydroxide/simethicone Suspension 30 milliLiter(s) Oral every 4 hours PRN Dyspepsia  melatonin 3 milliGRAM(s) Oral at bedtime PRN Insomnia  ondansetron Injectable 4 milliGRAM(s) IV Push every 8 hours PRN Nausea and/or Vomiting    Allergies    penicillin (Hives)    Intolerances    CONSTITUTIONAL:  No weight loss, fever, chills, weakness or fatigue.  HEENT:  Eyes:  No visual loss, blurred vision, double vision or yellow sclerae. Ears, Nose, Throat:  No hearing loss, sneezing, congestion, runny nose or sore throat.  SKIN:  No rash or itching.  CARDIOVASCULAR:  No chest pain, chest pressure or chest discomfort. No palpitations or edema.  RESPIRATORY:  No shortness of breath, cough or sputum.  GASTROINTESTINAL:  SEE HPI  GENITOURINARY:  No dysuria, hematuria, urinary frequency  NEUROLOGICAL:  No headache, dizziness, syncope, paralysis, ataxia, numbness or tingling in the extremities. No change in bowel or bladder control.  MUSCULOSKELETAL:  No muscle, back pain, joint pain or stiffness.  HEMATOLOGIC:  No anemia, bleeding or bruising.      ______________________________________________________________________  PHYSICAL EXAM:  T(C): 36.8 (08-02-22 @ 11:11), Max: 37.1 (08-02-22 @ 01:28)  HR: 70 (08-02-22 @ 11:11)  BP: 120/68 (08-02-22 @ 11:11)  RR: 18 (08-02-22 @ 11:11)  SpO2: 98% (08-02-22 @ 11:11)  Wt(kg): --    08-02 - 08-02  --------------------------------------------------------  IN:    Oral Fluid: 450 mL  Total IN: 450 mL    OUT:  Total OUT: 0 mL    Total NET: 450 mL          GEN: NAD, normocephalic  CVS: S1S2+  CHEST: clear to auscultation  ABD: soft , nontender, nondistended, bowel sounds present  EXTR: no cyanosis, no clubbing, no edema  NEURO: Awake and alert; oriented x3   SKIN:  warm;  non icteric    ______________________________________________________________________  LABS:                        6.5    6.89  )-----------( 54       ( 02 Aug 2022 10:12 )             19.2     08-02    141  |  106  |  24<H>  ----------------------------<  97  3.0<L>   |  26  |  0.66    Ca    8.0<L>      02 Aug 2022 05:40  Phos  2.4     08-02  Mg     2.0     08-02    TPro  5.1<L>  /  Alb  2.5<L>  /  TBili  0.7  /  DBili  x   /  AST  21  /  ALT  17  /  AlkPhos  47  08-02    LIVER FUNCTIONS - ( 02 Aug 2022 05:40 )  Alb: 2.5 g/dL / Pro: 5.1 g/dL / ALK PHOS: 47 U/L / ALT: 17 U/L DA / AST: 21 U/L / GGT: x           PT/INR - ( 01 Aug 2022 16:20 )   PT: 12.8 sec;   INR: 1.07 ratio         PTT - ( 01 Aug 2022 16:20 )  PTT:21.3 sec  ____________________________________________    IMAGING:              [facility]  Patient is a 77y old  Female who presents with a chief complaint of Weakness (02 Aug 2022 11:19)    HPI:  Patient is a 76 yo female who lives at home, is ambulatory, and lives with a bedbound , and has 1 HHA, PMH of MDS initially in remission, now mutated, and complicated by severe anemia and thrombocytopenia requiring blood transfusions, last of which was last week during her hospital admission here now presenting for extreme fatigue and weakness. Pt was recently admitted for similar complaints and was received blood transfusions and followed by GI.   On this admission Hg was 4.5 and plt of 8, pt received 2 PRBC and 1 unit of platelets, +ve FOBT, GI consulted for further evaluation     GI HPI:   Pt awake and alert ambulating in the room, reported worsening weakness at home with poor appetite, also reported noticing dark stools which started yesterday .Denies abdominal pain, nausea, vomiting, hematemesis, melena or hematochezia.   Patient reports having an endoscopy and colonoscopy in April at [facility] with normal results. Pt also had a capsule endoscopy done about 2 months ago which was also unremarkable     PMH/PSH:  PAST MEDICAL & SURGICAL HISTORY:  Hypertension      Anemia      Myelodysplastic syndrome      Pancytopenia      Anemia requiring transfusions      No significant past surgical history        FH:  FAMILY HISTORY:  No pertinent family history in first degree relatives        Social History:       MEDS:  MEDICATIONS  (STANDING):  pantoprazole  Injectable 40 milliGRAM(s) IV Push two times a day    MEDICATIONS  (PRN):  acetaminophen     Tablet .. 650 milliGRAM(s) Oral every 6 hours PRN Temp greater or equal to 38C (100.4F), Mild Pain (1 - 3), Moderate Pain (4 - 6)  aluminum hydroxide/magnesium hydroxide/simethicone Suspension 30 milliLiter(s) Oral every 4 hours PRN Dyspepsia  melatonin 3 milliGRAM(s) Oral at bedtime PRN Insomnia  ondansetron Injectable 4 milliGRAM(s) IV Push every 8 hours PRN Nausea and/or Vomiting    Allergies    penicillin (Hives)    Intolerances    CONSTITUTIONAL:  No weight loss, fever, chills, weakness or fatigue.  HEENT:  Eyes:  No visual loss, blurred vision, double vision or yellow sclerae. Ears, Nose, Throat:  No hearing loss, sneezing, congestion, runny nose or sore throat.  SKIN:  No rash or itching.  CARDIOVASCULAR:  No chest pain, chest pressure or chest discomfort. No palpitations or edema.  RESPIRATORY:  No shortness of breath, cough or sputum.  GASTROINTESTINAL:  SEE HPI  GENITOURINARY:  No dysuria, hematuria, urinary frequency  NEUROLOGICAL:  No headache, dizziness, syncope, paralysis, ataxia, numbness or tingling in the extremities. No change in bowel or bladder control.  MUSCULOSKELETAL:  No muscle, back pain, joint pain or stiffness.  HEMATOLOGIC:  No anemia, bleeding or bruising.      ______________________________________________________________________  PHYSICAL EXAM:  T(C): 36.8 (08-02-22 @ 11:11), Max: 37.1 (08-02-22 @ 01:28)  HR: 70 (08-02-22 @ 11:11)  BP: 120/68 (08-02-22 @ 11:11)  RR: 18 (08-02-22 @ 11:11)  SpO2: 98% (08-02-22 @ 11:11)  Wt(kg): --    08-02 - 08-02  --------------------------------------------------------  IN:    Oral Fluid: 450 mL  Total IN: 450 mL    OUT:  Total OUT: 0 mL    Total NET: 450 mL          GEN: NAD, normocephalic  CVS: S1S2+  CHEST: clear to auscultation  ABD: soft , nontender, nondistended, bowel sounds present  EXTR: no cyanosis, no clubbing, no edema  NEURO: Awake and alert; oriented x3   SKIN:  warm;  non icteric    ______________________________________________________________________  LABS:                        6.5    6.89  )-----------( 54       ( 02 Aug 2022 10:12 )             19.2     08-02    141  |  106  |  24<H>  ----------------------------<  97  3.0<L>   |  26  |  0.66    Ca    8.0<L>      02 Aug 2022 05:40  Phos  2.4     08-02  Mg     2.0     08-02    TPro  5.1<L>  /  Alb  2.5<L>  /  TBili  0.7  /  DBili  x   /  AST  21  /  ALT  17  /  AlkPhos  47  08-02    LIVER FUNCTIONS - ( 02 Aug 2022 05:40 )  Alb: 2.5 g/dL / Pro: 5.1 g/dL / ALK PHOS: 47 U/L / ALT: 17 U/L DA / AST: 21 U/L / GGT: x           PT/INR - ( 01 Aug 2022 16:20 )   PT: 12.8 sec;   INR: 1.07 ratio         PTT - ( 01 Aug 2022 16:20 )  PTT:21.3 sec  ____________________________________________    IMAGING:              IN GI CONSULTATION  Patient is a 77y old  Female who presents with a chief complaint of Weakness (02 Aug 2022 11:19)    HPI:  Patient is a 76 yo female who lives at home, is ambulatory, and lives with a bedbound , and has 1 HHA, PMH of MDS initially in remission, now mutated, and complicated by severe anemia and thrombocytopenia requiring blood transfusions, last of which was last week during her hospital admission here now presenting for extreme fatigue and weakness. Pt was recently admitted for similar complaints and was received blood transfusions and followed by GI.   On this admission Hg was 4.5 and plt of 8, pt received 2 PRBC and 1 unit of platelets, +ve FOBT, GI consulted for further evaluation     GI HPI:   Pt awake and alert ambulating in the room, reported worsening weakness at home with poor appetite, also reported noticing dark stools which started yesterday .Denies abdominal pain, nausea, vomiting, hematemesis, melena or hematochezia.   Patient reports having an endoscopy and colonoscopy in April at [facility] with normal results. Pt also had a capsule endoscopy done about 2 months ago which was also unremarkable     PMH/PSH:  PAST MEDICAL & SURGICAL HISTORY:  Hypertension      Anemia      Myelodysplastic syndrome      Pancytopenia      Anemia requiring transfusions      No significant past surgical history        FH:  FAMILY HISTORY:  No pertinent family history in first degree relatives        Social History:   Denies tobacco, etoh, or drug use.     MEDS:  MEDICATIONS  (STANDING):  pantoprazole  Injectable 40 milliGRAM(s) IV Push two times a day    MEDICATIONS  (PRN):  acetaminophen     Tablet .. 650 milliGRAM(s) Oral every 6 hours PRN Temp greater or equal to 38C (100.4F), Mild Pain (1 - 3), Moderate Pain (4 - 6)  aluminum hydroxide/magnesium hydroxide/simethicone Suspension 30 milliLiter(s) Oral every 4 hours PRN Dyspepsia  melatonin 3 milliGRAM(s) Oral at bedtime PRN Insomnia  ondansetron Injectable 4 milliGRAM(s) IV Push every 8 hours PRN Nausea and/or Vomiting    Allergies    penicillin (Hives)    Intolerances    CONSTITUTIONAL:  No weight loss, fever, chills, weakness or fatigue.  HEENT:  Eyes:  No visual loss, blurred vision, double vision or yellow sclerae. Ears, Nose, Throat:  No hearing loss, sneezing, congestion, runny nose or sore throat.  SKIN:  No rash or itching.  CARDIOVASCULAR:  No chest pain, chest pressure or chest discomfort. No palpitations or edema.  RESPIRATORY:  No shortness of breath, cough or sputum.  GASTROINTESTINAL:  SEE HPI  GENITOURINARY:  No dysuria, hematuria, urinary frequency  NEUROLOGICAL:  No headache, dizziness, syncope, paralysis, ataxia, numbness or tingling in the extremities. No change in bowel or bladder control.  MUSCULOSKELETAL:  No muscle, back pain, joint pain or stiffness.  HEMATOLOGIC:  No anemia, bleeding or bruising.      ______________________________________________________________________  PHYSICAL EXAM:  T(C): 36.8 (08-02-22 @ 11:11), Max: 37.1 (08-02-22 @ 01:28)  HR: 70 (08-02-22 @ 11:11)  BP: 120/68 (08-02-22 @ 11:11)  RR: 18 (08-02-22 @ 11:11)  SpO2: 98% (08-02-22 @ 11:11)  Wt(kg): --    08-02 - 08-02  --------------------------------------------------------  IN:    Oral Fluid: 450 mL  Total IN: 450 mL    OUT:  Total OUT: 0 mL    Total NET: 450 mL          GEN: NAD, normocephalic  CVS: S1S2+  CHEST: clear to auscultation  ABD: soft , nontender, nondistended, bowel sounds present  EXTR: no cyanosis, no clubbing, no edema  NEURO: Awake and alert; oriented x3   SKIN:  warm;  non icteric  PSYCH: calm cooperative.  HEENT: MMM.  Neck: Supple no jvd     ______________________________________________________________________  LABS:                        6.5    6.89  )-----------( 54       ( 02 Aug 2022 10:12 )             19.2     08-02    141  |  106  |  24<H>  ----------------------------<  97  3.0<L>   |  26  |  0.66    Ca    8.0<L>      02 Aug 2022 05:40  Phos  2.4     08-02  Mg     2.0     08-02    TPro  5.1<L>  /  Alb  2.5<L>  /  TBili  0.7  /  DBili  x   /  AST  21  /  ALT  17  /  AlkPhos  47  08-02    LIVER FUNCTIONS - ( 02 Aug 2022 05:40 )  Alb: 2.5 g/dL / Pro: 5.1 g/dL / ALK PHOS: 47 U/L / ALT: 17 U/L DA / AST: 21 U/L / GGT: x           PT/INR - ( 01 Aug 2022 16:20 )   PT: 12.8 sec;   INR: 1.07 ratio         PTT - ( 01 Aug 2022 16:20 )  PTT:21.3 sec  ____________________________________________    IMAGING:

## 2022-08-02 NOTE — PHYSICAL THERAPY INITIAL EVALUATION ADULT - GENERAL OBSERVATIONS, REHAB EVAL
Consult received,EMR, radiology and labs reviewed. Patient received supine in bed, NAD, states feel  better. Patient agreed to EVALUATION from Physical Therapist.

## 2022-08-02 NOTE — CONSULT NOTE ADULT - ASSESSMENT
76 yo female who lives at home, is ambulatory, and lives with a bedbound , and has 1 HHA, PMH of MDS initially in remission, now mutated, and complicated by severe anemia and thrombocytopenia requiring blood transfusions, last of which was last week during her hospital admission here now presenting for extreme fatigue and weakness. Admitted for symptomatic anemia, +ve FOBT,  GI consulted for suspected GI bleed
GIB -- pending GI eval, now with melena  -- hgb improved after PRBC   -- monitor clinically, still with melena  -- may had been exacerbated by thrombocytopenia as well  -- coags adequate/nml    MDS -- recurrent MDS, has an appt with MYLES this Thurs that she really wants to make  -- s/p C2 azacitadine in office  -- monitor CBC at this time  -- transfuse for hgb <7, plts <50 for bleeding  -- f/u Dr Mosley after d/c    afib -- not on AC due to GIB    D/w pt, will follow.

## 2022-08-02 NOTE — PROVIDER CONTACT NOTE (CRITICAL VALUE NOTIFICATION) - BACKGROUND
Patient A&OX4. PMH of MDS, Anemia. Coming in with anemia. S/P 2 units of PRBCs and 1 unit of platelet in ED & ED Hold Patient A&OX4. PMH of MDS, Anemia. Coming in with GI Hemorrhage. S/P 2 units of PRBCs and 1 unit of platelet in ED & ED Hold

## 2022-08-02 NOTE — CONSULT NOTE ADULT - SUBJECTIVE AND OBJECTIVE BOX
Pt known to our service, pt of Dr Mosley. Briefly, 78 yo female who lives at home, is ambulatory, and lives with a bedbound , and has 1 HHA, PMH of MDS initially in remission, now mutated, and complicated by severe anemia and thrombocytopenia requiring blood transfusions, last of which was last week during her hospital admission here now presenting for extreme fatigue and weakness.     During her last admission, pt was hemodynamically stable, afebrile. She received 2 more units of blood on 7/22 due to a Hb <7. Platelets stayed above 50, so no additional platelet transfusion was indicated by Dr. Mosley, her hematologist. GI, cardiology, and hematology was following her throughout her hospital stay. GI thought that it was not indicated to do a colonoscopy/endoscopy (recently had both done 1 month ago at [facility]), and the results were normal. There was also a low suspicion for a GI bleed since the pt. was symptomatically improving and no longer endorsing diarrhea or dark stools/bloody stools. They recommended PPI BID and close monitoring of H/H. On 7/25 her Hb was 7.1, so Dr. Mosley wanted her to receive 1 unit of pRBCs, and then discharge home. Her afib with RVR was treated with metroprolol.     Since then, she was told to stop her metoprolol and go back to her regimen of Amlodipine 10mg and Carvedilol 25 mg, of which she started today. She is scheduled to have a follow up appointment with her hematologist at [facility] this Thursday 8/4.    In the ED, she had a bowel movement which was black, FOBT was found to be positive. CBC was notable for Hgb of 4.5.    She received 2U PRBC and 1U plts for plts of 8, feeling better today. She is still seeing melena, some BRBPR, no other new complaints.     PAST MEDICAL & SURGICAL HISTORY:  Hypertension      Anemia      Myelodysplastic syndrome      Pancytopenia      Anemia requiring transfusions      No significant past surgical history          FAMILY HISTORY:  No pertinent family history in first degree relatives        Alochol: Denied  Smoking: Nonsmoker  Drug Use: Denied  Marital Status:         Allergies    penicillin (Hives)    Intolerances        MEDICATIONS  (STANDING):  pantoprazole  Injectable 40 milliGRAM(s) IV Push two times a day    MEDICATIONS  (PRN):  acetaminophen     Tablet .. 650 milliGRAM(s) Oral every 6 hours PRN Temp greater or equal to 38C (100.4F), Mild Pain (1 - 3), Moderate Pain (4 - 6)  aluminum hydroxide/magnesium hydroxide/simethicone Suspension 30 milliLiter(s) Oral every 4 hours PRN Dyspepsia  melatonin 3 milliGRAM(s) Oral at bedtime PRN Insomnia  ondansetron Injectable 4 milliGRAM(s) IV Push every 8 hours PRN Nausea and/or Vomiting      ROS  no cp/sob/n/v/d/abd pain  as above  limited 2/2 covid pandemic    T(C): 36.8 (08-02-22 @ 11:11), Max: 37.1 (08-02-22 @ 01:28)  HR: 70 (08-02-22 @ 11:11) (66 - 100)  BP: 120/68 (08-02-22 @ 11:11) (92/50 - 170/72)  RR: 18 (08-02-22 @ 11:11) (16 - 18)  SpO2: 98% (08-02-22 @ 11:11) (96% - 100%)  Wt(kg): --    PE  NAD  Awake, alert  pale  Anicteric  limited 2/2 covid pandemic                          6.5    6.89  )-----------( 54       ( 02 Aug 2022 10:12 )             19.2       08-02    141  |  106  |  24<H>  ----------------------------<  97  3.0<L>   |  26  |  0.66    Ca    8.0<L>      02 Aug 2022 05:40  Phos  2.4     08-02  Mg     2.0     08-02    TPro  5.1<L>  /  Alb  2.5<L>  /  TBili  0.7  /  DBili  x   /  AST  21  /  ALT  17  /  AlkPhos  47  08-02

## 2022-08-03 ENCOUNTER — TRANSCRIPTION ENCOUNTER (OUTPATIENT)
Age: 77
End: 2022-08-03

## 2022-08-03 VITALS
SYSTOLIC BLOOD PRESSURE: 123 MMHG | RESPIRATION RATE: 18 BRPM | HEART RATE: 78 BPM | DIASTOLIC BLOOD PRESSURE: 79 MMHG | TEMPERATURE: 99 F

## 2022-08-03 LAB
ANION GAP SERPL CALC-SCNC: 8 MMOL/L — SIGNIFICANT CHANGE UP (ref 5–17)
ANION GAP SERPL CALC-SCNC: 9 MMOL/L — SIGNIFICANT CHANGE UP (ref 5–17)
BUN SERPL-MCNC: 10 MG/DL — SIGNIFICANT CHANGE UP (ref 7–18)
BUN SERPL-MCNC: 12 MG/DL — SIGNIFICANT CHANGE UP (ref 7–18)
CALCIUM SERPL-MCNC: 8.5 MG/DL — SIGNIFICANT CHANGE UP (ref 8.4–10.5)
CALCIUM SERPL-MCNC: 8.7 MG/DL — SIGNIFICANT CHANGE UP (ref 8.4–10.5)
CHLORIDE SERPL-SCNC: 101 MMOL/L — SIGNIFICANT CHANGE UP (ref 96–108)
CHLORIDE SERPL-SCNC: 103 MMOL/L — SIGNIFICANT CHANGE UP (ref 96–108)
CO2 SERPL-SCNC: 26 MMOL/L — SIGNIFICANT CHANGE UP (ref 22–31)
CO2 SERPL-SCNC: 30 MMOL/L — SIGNIFICANT CHANGE UP (ref 22–31)
CREAT SERPL-MCNC: 0.64 MG/DL — SIGNIFICANT CHANGE UP (ref 0.5–1.3)
CREAT SERPL-MCNC: 0.69 MG/DL — SIGNIFICANT CHANGE UP (ref 0.5–1.3)
EGFR: 90 ML/MIN/1.73M2 — SIGNIFICANT CHANGE UP
EGFR: 92 ML/MIN/1.73M2 — SIGNIFICANT CHANGE UP
GLUCOSE SERPL-MCNC: 109 MG/DL — HIGH (ref 70–99)
GLUCOSE SERPL-MCNC: 119 MG/DL — HIGH (ref 70–99)
HCT VFR BLD CALC: 24.6 % — LOW (ref 34.5–45)
HCT VFR BLD CALC: 25 % — LOW (ref 34.5–45)
HGB BLD-MCNC: 8.4 G/DL — LOW (ref 11.5–15.5)
HGB BLD-MCNC: 8.7 G/DL — LOW (ref 11.5–15.5)
MAGNESIUM SERPL-MCNC: 2.1 MG/DL — SIGNIFICANT CHANGE UP (ref 1.6–2.6)
MCHC RBC-ENTMCNC: 29.4 PG — SIGNIFICANT CHANGE UP (ref 27–34)
MCHC RBC-ENTMCNC: 29.9 PG — SIGNIFICANT CHANGE UP (ref 27–34)
MCHC RBC-ENTMCNC: 34.1 GM/DL — SIGNIFICANT CHANGE UP (ref 32–36)
MCHC RBC-ENTMCNC: 34.8 GM/DL — SIGNIFICANT CHANGE UP (ref 32–36)
MCV RBC AUTO: 85.9 FL — SIGNIFICANT CHANGE UP (ref 80–100)
MCV RBC AUTO: 86 FL — SIGNIFICANT CHANGE UP (ref 80–100)
NRBC # BLD: 0 /100 WBCS — SIGNIFICANT CHANGE UP (ref 0–0)
NRBC # BLD: 0 /100 WBCS — SIGNIFICANT CHANGE UP (ref 0–0)
PHOSPHATE SERPL-MCNC: 2.9 MG/DL — SIGNIFICANT CHANGE UP (ref 2.5–4.5)
PLATELET # BLD AUTO: 43 K/UL — LOW (ref 150–400)
PLATELET # BLD AUTO: 88 K/UL — LOW (ref 150–400)
POTASSIUM SERPL-MCNC: 2.5 MMOL/L — CRITICAL LOW (ref 3.5–5.3)
POTASSIUM SERPL-MCNC: 4 MMOL/L — SIGNIFICANT CHANGE UP (ref 3.5–5.3)
POTASSIUM SERPL-SCNC: 2.5 MMOL/L — CRITICAL LOW (ref 3.5–5.3)
POTASSIUM SERPL-SCNC: 4 MMOL/L — SIGNIFICANT CHANGE UP (ref 3.5–5.3)
RBC # BLD: 2.86 M/UL — LOW (ref 3.8–5.2)
RBC # BLD: 2.91 M/UL — LOW (ref 3.8–5.2)
RBC # FLD: 17.2 % — HIGH (ref 10.3–14.5)
RBC # FLD: 17.5 % — HIGH (ref 10.3–14.5)
SODIUM SERPL-SCNC: 138 MMOL/L — SIGNIFICANT CHANGE UP (ref 135–145)
SODIUM SERPL-SCNC: 139 MMOL/L — SIGNIFICANT CHANGE UP (ref 135–145)
WBC # BLD: 4.33 K/UL — SIGNIFICANT CHANGE UP (ref 3.8–10.5)
WBC # BLD: 5.04 K/UL — SIGNIFICANT CHANGE UP (ref 3.8–10.5)
WBC # FLD AUTO: 4.33 K/UL — SIGNIFICANT CHANGE UP (ref 3.8–10.5)
WBC # FLD AUTO: 5.04 K/UL — SIGNIFICANT CHANGE UP (ref 3.8–10.5)

## 2022-08-03 PROCEDURE — 93005 ELECTROCARDIOGRAM TRACING: CPT

## 2022-08-03 PROCEDURE — P9100: CPT

## 2022-08-03 PROCEDURE — 83735 ASSAY OF MAGNESIUM: CPT

## 2022-08-03 PROCEDURE — 85027 COMPLETE CBC AUTOMATED: CPT

## 2022-08-03 PROCEDURE — 36415 COLL VENOUS BLD VENIPUNCTURE: CPT

## 2022-08-03 PROCEDURE — 87635 SARS-COV-2 COVID-19 AMP PRB: CPT

## 2022-08-03 PROCEDURE — 84100 ASSAY OF PHOSPHORUS: CPT

## 2022-08-03 PROCEDURE — 44366 SMALL BOWEL ENDOSCOPY: CPT

## 2022-08-03 PROCEDURE — 80048 BASIC METABOLIC PNL TOTAL CA: CPT

## 2022-08-03 PROCEDURE — 85730 THROMBOPLASTIN TIME PARTIAL: CPT

## 2022-08-03 PROCEDURE — 86850 RBC ANTIBODY SCREEN: CPT

## 2022-08-03 PROCEDURE — 84484 ASSAY OF TROPONIN QUANT: CPT

## 2022-08-03 PROCEDURE — 80053 COMPREHEN METABOLIC PANEL: CPT

## 2022-08-03 PROCEDURE — 82962 GLUCOSE BLOOD TEST: CPT

## 2022-08-03 PROCEDURE — P9040: CPT

## 2022-08-03 PROCEDURE — 36430 TRANSFUSION BLD/BLD COMPNT: CPT

## 2022-08-03 PROCEDURE — P9037: CPT

## 2022-08-03 PROCEDURE — 86922 COMPATIBILITY TEST ANTIGLOB: CPT

## 2022-08-03 PROCEDURE — 86900 BLOOD TYPING SEROLOGIC ABO: CPT

## 2022-08-03 PROCEDURE — 82272 OCCULT BLD FECES 1-3 TESTS: CPT

## 2022-08-03 PROCEDURE — 97162 PT EVAL MOD COMPLEX 30 MIN: CPT

## 2022-08-03 PROCEDURE — C1889: CPT

## 2022-08-03 PROCEDURE — 85610 PROTHROMBIN TIME: CPT

## 2022-08-03 PROCEDURE — 85025 COMPLETE CBC W/AUTO DIFF WBC: CPT

## 2022-08-03 PROCEDURE — 99285 EMERGENCY DEPT VISIT HI MDM: CPT | Mod: 25

## 2022-08-03 PROCEDURE — 86901 BLOOD TYPING SEROLOGIC RH(D): CPT

## 2022-08-03 DEVICE — CLIP RESOLUTION 360 235CM: Type: IMPLANTABLE DEVICE | Status: FUNCTIONAL

## 2022-08-03 DEVICE — CATH BALLOON DIL 6-8MM: Type: IMPLANTABLE DEVICE | Status: FUNCTIONAL

## 2022-08-03 DEVICE — CATH ESOPH DIL 9 ATM 6FR 8-10MM: Type: IMPLANTABLE DEVICE | Status: FUNCTIONAL

## 2022-08-03 DEVICE — CATH ESOPH DIL 8 ATM 6FR10-12M: Type: IMPLANTABLE DEVICE | Status: FUNCTIONAL

## 2022-08-03 RX ORDER — POTASSIUM CHLORIDE 20 MEQ
40 PACKET (EA) ORAL ONCE
Refills: 0 | Status: COMPLETED | OUTPATIENT
Start: 2022-08-03 | End: 2022-08-03

## 2022-08-03 RX ORDER — AMLODIPINE BESYLATE 2.5 MG/1
10 TABLET ORAL ONCE
Refills: 0 | Status: COMPLETED | OUTPATIENT
Start: 2022-08-03 | End: 2022-08-03

## 2022-08-03 RX ORDER — POTASSIUM CHLORIDE 20 MEQ
10 PACKET (EA) ORAL
Refills: 0 | Status: COMPLETED | OUTPATIENT
Start: 2022-08-03 | End: 2022-08-03

## 2022-08-03 RX ORDER — AMLODIPINE BESYLATE 2.5 MG/1
10 TABLET ORAL EVERY 24 HOURS
Refills: 0 | Status: DISCONTINUED | OUTPATIENT
Start: 2022-08-03 | End: 2022-08-03

## 2022-08-03 RX ORDER — POTASSIUM CHLORIDE 20 MEQ
40 PACKET (EA) ORAL ONCE
Refills: 0 | Status: DISCONTINUED | OUTPATIENT
Start: 2022-08-03 | End: 2022-08-03

## 2022-08-03 RX ORDER — CARVEDILOL PHOSPHATE 80 MG/1
25 CAPSULE, EXTENDED RELEASE ORAL EVERY 12 HOURS
Refills: 0 | Status: DISCONTINUED | OUTPATIENT
Start: 2022-08-03 | End: 2022-08-03

## 2022-08-03 RX ORDER — PANTOPRAZOLE SODIUM 20 MG/1
1 TABLET, DELAYED RELEASE ORAL
Qty: 60 | Refills: 0
Start: 2022-08-03 | End: 2022-09-01

## 2022-08-03 RX ADMIN — Medication 40 MILLIEQUIVALENT(S): at 12:24

## 2022-08-03 RX ADMIN — Medication 100 MILLIEQUIVALENT(S): at 09:33

## 2022-08-03 RX ADMIN — Medication 100 MILLIEQUIVALENT(S): at 10:36

## 2022-08-03 RX ADMIN — Medication 40 MILLIEQUIVALENT(S): at 08:20

## 2022-08-03 RX ADMIN — CARVEDILOL PHOSPHATE 25 MILLIGRAM(S): 80 CAPSULE, EXTENDED RELEASE ORAL at 18:49

## 2022-08-03 RX ADMIN — AMLODIPINE BESYLATE 10 MILLIGRAM(S): 2.5 TABLET ORAL at 06:15

## 2022-08-03 RX ADMIN — Medication 100 MILLIEQUIVALENT(S): at 08:20

## 2022-08-03 RX ADMIN — PANTOPRAZOLE SODIUM 40 MILLIGRAM(S): 20 TABLET, DELAYED RELEASE ORAL at 18:49

## 2022-08-03 RX ADMIN — PANTOPRAZOLE SODIUM 40 MILLIGRAM(S): 20 TABLET, DELAYED RELEASE ORAL at 06:16

## 2022-08-03 NOTE — DISCHARGE NOTE PROVIDER - NSDCMRMEDTOKEN_GEN_ALL_CORE_FT
amLODIPine 10 mg oral tablet: 1 tab(s) orally once a day  carvedilol 25 mg oral tablet: 1 tab(s) orally 2 times a day  pantoprazole 40 mg oral delayed release tablet: 1 tab(s) orally once a day  Preparation H Cooling 0.25% rectal gel: 1 application rectal 2 times a day    amLODIPine 10 mg oral tablet: 1 tab(s) orally once a day  carvedilol 25 mg oral tablet: 1 tab(s) orally 2 times a day  pantoprazole 40 mg oral delayed release tablet: 1 tab(s) orally 2 times a day   Preparation H Cooling 0.25% rectal gel: 1 application rectal 2 times a day

## 2022-08-03 NOTE — PROGRESS NOTE ADULT - SUBJECTIVE AND OBJECTIVE BOX
GI PROGRESS NOTE  Patient is a 77y old  Female who presents with a chief complaint of Weakness (03 Aug 2022 07:36)      HPI:  Patient is a 78 yo female who lives at home, is ambulatory, and lives with a bedbound , and has 1 HHA, PMH of MDS initially in remission, now mutated, and complicated by severe anemia and thrombocytopenia requiring blood transfusions, last of which was last week during her hospital admission here now presenting for extreme fatigue and weakness. Pt was recently admitted for similar complaints and was received blood transfusions and followed by GI.   On this admission Hg was 4.5 and plt of 8, pt received 2 PRBC and 1 unit of platelets, +ve FOBT, GI consulted for further evaluation     GI HPI:   Pt awake and alert lying in bed, pt wants to be discharged today because she has an appointment with Heme/onc on 8/4 at [facility] [place]. Reported no BM overnight, denies abdominal pain, nausea, vomiting, hematemesis, melena or hematochezia.     MEDS:  MEDICATIONS  (STANDING):  pantoprazole  Injectable 40 milliGRAM(s) IV Push two times a day  potassium chloride  10 mEq/100 mL IVPB 10 milliEquivalent(s) IV Intermittent every 1 hour    MEDICATIONS  (PRN):  acetaminophen     Tablet .. 650 milliGRAM(s) Oral every 6 hours PRN Temp greater or equal to 38C (100.4F), Mild Pain (1 - 3), Moderate Pain (4 - 6)  aluminum hydroxide/magnesium hydroxide/simethicone Suspension 30 milliLiter(s) Oral every 4 hours PRN Dyspepsia  melatonin 3 milliGRAM(s) Oral at bedtime PRN Insomnia  ondansetron Injectable 4 milliGRAM(s) IV Push every 8 hours PRN Nausea and/or Vomiting    ______________________________________________________________________  ALL:   Allergies    penicillin (Hives)    Intolerances    ______________________________________________________________________  ROS:    CONSTITUTIONAL:  No weight loss, fever, chills, weakness or fatigue.    CARDIOVASCULAR:  No chest pain, chest pressure or chest discomfort. No palpitations or edema.    RESPIRATORY:  No shortness of breath, cough or sputum.    GASTROINTESTINAL:  SEE HPI    GENITOURINARY:  No dysuria, hematuria, urinary frequency    NEUROLOGICAL:  No headache, dizziness, syncope, paralysis, ataxia, numbness or tingling in the extremities. No change in bowel or bladder control.    MUSCULOSKELETAL:  No muscle, back pain, joint pain or stiffness.    ______________________________________________________________________    T(C): 36.8 (08-03-22 @ 07:42), Max: 37.1 (08-02-22 @ 16:25)  HR: 99 (08-03-22 @ 07:42)  BP: 146/82 (08-03-22 @ 07:42)  RR: 18 (08-03-22 @ 07:42)  SpO2: 98% (08-03-22 @ 07:42)  Wt(kg): --    08-02 - 08-03  --------------------------------------------------------  IN:    Oral Fluid: 450 mL  Total IN: 450 mL    OUT:  Total OUT: 0 mL    Total NET: 450 mL    PHYSICAL EXAM:  GEN: NAD   CVS- S1 S2  ABD: soft nontender, non distended, bowel sounds+  LUNGS: clear to auscultation  NEURO: non focal neuro exam; AAO x 3  Extremities: no cyanosis, no calf tenderness, no edema, no clubbing    ______________________________________________________________________  LABS:                        8.4    4.33  )-----------( 43       ( 03 Aug 2022 05:52 )             24.6     08-03    139  |  101  |  12  ----------------------------<  109<H>  2.5<LL>   |  30  |  0.69    Ca    8.5      03 Aug 2022 05:52  Phos  2.9     08-03  Mg     2.1     08-03    TPro  5.1<L>  /  Alb  2.5<L>  /  TBili  0.7  /  DBili  x   /  AST  21  /  ALT  17  /  AlkPhos  47  08-02    LIVER FUNCTIONS - ( 02 Aug 2022 05:40 )  Alb: 2.5 g/dL / Pro: 5.1 g/dL / ALK PHOS: 47 U/L / ALT: 17 U/L DA / AST: 21 U/L / GGT: x           ______________________________________________________________________  IMAGING:                
PGY-1 Progress Note discussed with attending    PAGER #: [[phone]] TILL 5:00 PM  PLEASE CONTACT ON CALL TEAM:  - On Call Team (Please refer to Katharine) FROM 5:00 PM - 8:30PM  - Nightfloat Team FROM 8:30 -7:30 AM    CHIEF COMPLAINT & BRIEF HOSPITAL COURSE:    INTERVAL HPI/OVERNIGHT EVENTS:       REVIEW OF SYSTEMS:  CONSTITUTIONAL: No fever, weight loss, or fatigue  RESPIRATORY: No cough, wheezing, chills or hemoptysis; No shortness of breath  CARDIOVASCULAR: No chest pain, palpitations, dizziness, or leg swelling  GASTROINTESTINAL: No abdominal pain. No nausea, vomiting, or hematemesis; No diarrhea or constipation. No melena or hematochezia.  GENITOURINARY: No dysuria or hematuria, urinary frequency  NEUROLOGICAL: No headaches, memory loss, loss of strength, numbness, or tremors  SKIN: No itching, burning, rashes, or lesions     Vital Signs Last 24 Hrs  T(C): 36.7 (02 Aug 2022 09:15), Max: 37.1 (02 Aug 2022 01:28)  T(F): 98 (02 Aug 2022 09:15), Max: 98.8 (02 Aug 2022 01:28)  HR: 88 (02 Aug 2022 09:15) (66 - 92)  BP: 125/65 (02 Aug 2022 09:15) (92/50 - 154/60)  BP(mean): --  RR: 18 (02 Aug 2022 09:15) (16 - 18)  SpO2: 98% (02 Aug 2022 09:15) (96% - 100%)    Parameters below as of 02 Aug 2022 09:15  Patient On (Oxygen Delivery Method): room air        PHYSICAL EXAMINATION:  GENERAL: NAD, well built  HEAD:  Atraumatic, Normocephalic  EYES:  conjunctiva and sclera clear  NECK: Supple, No JVD, Normal thyroid  CHEST/LUNG: Clear to auscultation. Clear to percussion bilaterally; No rales, rhonchi, wheezing, or rubs  HEART: Regular rate and rhythm; No murmurs, rubs, or gallops  ABDOMEN: Soft, Nontender, Nondistended; Bowel sounds present  NERVOUS SYSTEM:  Alert & Oriented X3,    EXTREMITIES:  2+ Peripheral Pulses, No clubbing, cyanosis, or edema  SKIN: warm dry                          7.0    5.14  )-----------( 54       ( 02 Aug 2022 05:40 )             20.5     08-02    141  |  106  |  24<H>  ----------------------------<  97  3.0<L>   |  26  |  0.66    Ca    8.0<L>      02 Aug 2022 05:40  Phos  2.4     08-02  Mg     2.0     08-02    TPro  5.1<L>  /  Alb  2.5<L>  /  TBili  0.7  /  DBili  x   /  AST  21  /  ALT  17  /  AlkPhos  47  08-02    LIVER FUNCTIONS - ( 02 Aug 2022 05:40 )  Alb: 2.5 g/dL / Pro: 5.1 g/dL / ALK PHOS: 47 U/L / ALT: 17 U/L DA / AST: 21 U/L / GGT: x               PT/INR - ( 01 Aug 2022 16:20 )   PT: 12.8 sec;   INR: 1.07 ratio         PTT - ( 01 Aug 2022 16:20 )  PTT:21.3 sec    CAPILLARY BLOOD GLUCOSE      RADIOLOGY & ADDITIONAL TESTS:                  
Patient is a 77y old  Female who presents with a chief complaint of Weakness (02 Aug 2022 13:40)    PATIENT IS SEEN AND EXAMINED IN MEDICAL FLOOR.  NGT [    ]    BELINDA [   ]      GT [   ]    ALLERGIES:  penicillin (Hives)      Daily     Daily     VITALS:    Vital Signs Last 24 Hrs  T(C): 36.7 (02 Aug 2022 19:53), Max: 37.1 (02 Aug 2022 01:28)  T(F): 98 (02 Aug 2022 19:53), Max: 98.8 (02 Aug 2022 01:28)  HR: 94 (02 Aug 2022 19:53) (70 - 100)  BP: 192/69 (02 Aug 2022 19:53) (101/60 - 192/69)  BP(mean): --  RR: 18 (02 Aug 2022 19:53) (17 - 18)  SpO2: 98% (02 Aug 2022 19:53) (94% - 99%)    Parameters below as of 02 Aug 2022 19:53  Patient On (Oxygen Delivery Method): room air        LABS:    CBC Full  -  ( 02 Aug 2022 21:00 )  WBC Count : x  RBC Count : 2.87 M/uL  Hemoglobin : 8.6 g/dL  Hematocrit : 25.5 %  Platelet Count - Automated : x  Mean Cell Volume : 88.9 fl  Mean Cell Hemoglobin : 30.0 pg  Mean Cell Hemoglobin Concentration : 33.7 gm/dL  Auto Neutrophil # : x  Auto Lymphocyte # : x  Auto Monocyte # : x  Auto Eosinophil # : x  Auto Basophil # : x  Auto Neutrophil % : x  Auto Lymphocyte % : x  Auto Monocyte % : x  Auto Eosinophil % : x  Auto Basophil % : x    PT/INR - ( 01 Aug 2022 16:20 )   PT: 12.8 sec;   INR: 1.07 ratio         PTT - ( 01 Aug 2022 16:20 )  PTT:21.3 sec  08-02    141  |  106  |  24<H>  ----------------------------<  97  3.0<L>   |  26  |  0.66    Ca    8.0<L>      02 Aug 2022 05:40  Phos  2.4     08-02  Mg     2.0     08-02    TPro  5.1<L>  /  Alb  2.5<L>  /  TBili  0.7  /  DBili  x   /  AST  21  /  ALT  17  /  AlkPhos  47  08-02    CAPILLARY BLOOD GLUCOSE      POCT Blood Glucose.: 124 mg/dL (02 Aug 2022 22:09)        LIVER FUNCTIONS - ( 02 Aug 2022 05:40 )  Alb: 2.5 g/dL / Pro: 5.1 g/dL / ALK PHOS: 47 U/L / ALT: 17 U/L DA / AST: 21 U/L / GGT: x           Creatinine Trend: 0.66<--, 1.02<--, 0.89<--, 0.77<--, 0.73<--, 1.01<--  I&O's Summary    02 Aug 2022 07:01  -  02 Aug 2022 22:36  --------------------------------------------------------  IN: 450 mL / OUT: 0 mL / NET: 450 mL            Clean Catch Clean Catch (Midstream)  06-03 @ 21:17   >100,000 CFU/ml Escherichia coli ESBL  <10,000 CFU/ml Normal Urogenital marely present  --  Escherichia coli ESBL          MEDICATIONS:    MEDICATIONS  (STANDING):  pantoprazole  Injectable 40 milliGRAM(s) IV Push two times a day      MEDICATIONS  (PRN):  acetaminophen     Tablet .. 650 milliGRAM(s) Oral every 6 hours PRN Temp greater or equal to 38C (100.4F), Mild Pain (1 - 3), Moderate Pain (4 - 6)  aluminum hydroxide/magnesium hydroxide/simethicone Suspension 30 milliLiter(s) Oral every 4 hours PRN Dyspepsia  melatonin 3 milliGRAM(s) Oral at bedtime PRN Insomnia  ondansetron Injectable 4 milliGRAM(s) IV Push every 8 hours PRN Nausea and/or Vomiting      REVIEW OF SYSTEMS:                           ALL ROS DONE [ X   ]    CONSTITUTIONAL:  LETHARGIC [   ], FEVER [   ], UNRESPONSIVE [   ]  CVS:  CP  [   ], SOB, [   ], PALPITATIONS [   ], DIZZYNESS [   ]  RS: COUGH [   ], SPUTUM [   ]  GI: ABDOMINAL PAIN [   ], NAUSEA [   ], VOMITINGS [   ], DIARRHEA [   ], CONSTIPATION [   ]  :  DYSURIA [   ], NOCTURIA [   ], INCREASED FREQUENCY [   ], DRIBLING [   ],  SKELETAL: PAINFUL JOINTS [   ], SWOLLEN JOINTS [   ], NECK ACHE [   ], LOW BACK ACHE [   ],  SKIN : ULCERS [   ], RASH [   ], ITCHING [   ]  CNS: HEAD ACHE [   ], DOUBLE VISION [   ], BLURRED VISION [   ], AMS / CONFUSION [   ], SEIZURES [   ], WEAKNESS [   ],TINGLING / NUMBNESS [   ]    PHYSICAL EXAMINATION:  GENERAL APPEARANCE: NO DISTRESS  HEENT:  NO PALLOR, NO  JVD,  NO   NODES, NECK SUPPLE  CVS: S1 +, S2 +,   RS: AEEB,  OCCASIONAL  RALES +,   NO RONCHI  ABD: SOFT, NT, NO, BS +  EXT: NO PE  SKIN: WARM,   SKELETAL:  ROM ACCEPTABLE  CNS:  AAO X  3    RADIOLOGY :        ASSESSMENT :     Gastrointestinal hemorrhage    Hypertension    Anemia    Myelodysplastic syndrome    Pancytopenia    Anemia requiring transfusions    No significant past surgical history        PLAN:  HPI:  Patient is a 78 yo female who lives at home, is ambulatory, and lives with a bedbound , and has 1 HHA, PMH of MDS initially in remission, now mutated, and complicated by severe anemia and thrombocytopenia requiring blood transfusions, last of which was last week during her hospital admission here now presenting for extreme fatigue and weakness. She was going up stairs when she suddenly felt very weak and had to sit down. Her daughter called a neighbor who helped carry her to sit before she arrived at the hospital. She denies any new chest pain, SOB, abdominal pain, nausea, vomiting and diarrhea. She says her bowel movements have been brown; no blood while at home.     During her last admission, pt was hemodynamically stable, afebrile. She received 2 more units of blood on 7/22 due to a Hb <7. Platelets stayed above 50, so no additional platelet transfusion was indicated by Dr. Mosley, her hematologist. GI, cardiology, and hematology was following her throughout her hospital stay. GI thought that it was not indicated to do a colonoscopy/endoscopy (recently had both done 1 month ago at [facility]), and the results were normal. There was also a low suspicion for a GI bleed since the pt. was symptomatically improving and no longer endorsing diarrhea or dark stools/bloody stools. They recommended PPI BID and close monitoring of H/H. On 7/25 her Hb was 7.1, so Dr. Mosley wanted her to receive 1 unit of pRBCs, and then discharge home. Her afib with RVR was treated with metroprolol.     Since then, she was told to stop her metoprolol and go back to her regimen of Amlodipine 10mg and Carvedilol 25 mg, of which she started today. She is scheduled to have a follow up appointment with her hematologist at [facility] this Thursday 8/4.    In the ED, she had a bowel movement which was black, FOBT was found to be positive. CBC was notable for Hgb of 4.5.     (01 Aug 2022 19:46)    # R/O GI BLEED  # NORMOCYTIC ANEMIA, MDS  # THROMBOCYTOPENIA   # CHRONIC CONSTIPATION, HX OF HEMORRHOID  - GIVEN 3 UNITS PRBC,1 UNITS PLATELETS   - F/U POST TRANSFUSION CBC, MONITOR SYMPTOMS CLOSELY DURING TRANSFUSION  - TREND HGB, PPI IV BID  - TRANSFUSION THRESHOLD HGB < 7  - TRANSFUSION THRESHOLD FOR PLT < 10 [IF NO BLEEDING], < 50 [IF BLEEDING]    - CLD , NPO AFTER MIDNIGHT    - GASTROENTEROLOGY CONSULT  - HEME/ONC CONSULT    - EGD/COLONOSCOPY IN 2022 AND ?CAPSULE ENDOSCOPY - WILL OBTAIN RECORDS    - PLANNED FOR EGD 8/3    - LOW THRESHOLD FOR CRITICAL CARE CONSULT, D/W RESIDENT TEAM    # CHRONIC A.FIB  - PRN RATE CONTROL  - MONITOR ON TELEMETRY  - F/U ECHOCARDIOGRAM  - HOLD A/C GIVEN CONCERN FOR BLEEDING; DISCUSSED WITH PATIENT AND WITH DAUGHTER REGARDING BLEEDING RISK VS. STROKE RISK. BOTH VERBALIZED UNDERSTANDING   - HOLDING METOPROLOL   - CARDIOLOGY CONSULT      # SUSPECT MODERATE PROTEIN CALORIE MALNUTRITION  - NUTRITIONAL SUPPLEMENTATION    # HYPOKALEMIA  - REPLETE WITH SUPPLEMENT  - MONITORING CLOSELY    # GI PPX; AT BOOTS .        
Patient is a 77y old  Female who presents with a chief complaint of Weakness (03 Aug 2022 16:35)      MEDICATIONS  (STANDING):  amLODIPine   Tablet 10 milliGRAM(s) Oral every 24 hours  carvedilol 25 milliGRAM(s) Oral every 12 hours  pantoprazole  Injectable 40 milliGRAM(s) IV Push two times a day    MEDICATIONS  (PRN):  acetaminophen     Tablet .. 650 milliGRAM(s) Oral every 6 hours PRN Temp greater or equal to 38C (100.4F), Mild Pain (1 - 3), Moderate Pain (4 - 6)  aluminum hydroxide/magnesium hydroxide/simethicone Suspension 30 milliLiter(s) Oral every 4 hours PRN Dyspepsia  melatonin 3 milliGRAM(s) Oral at bedtime PRN Insomnia  ondansetron Injectable 4 milliGRAM(s) IV Push every 8 hours PRN Nausea and/or Vomiting      ROS  No fever, sweats, chills  No epistaxis, HA, sore throat  No CP, SOB, cough, sputum  No n/v/d, abd pain, melena, hematochezia  No edema  No rash  No anxiety  No back pain, joint pain  No bleeding, bruising  No dysuria, hematuria    Vital Signs Last 24 Hrs  T(C): 36.6 (03 Aug 2022 15:57), Max: 37 (03 Aug 2022 00:21)  T(F): 97.8 (03 Aug 2022 15:57), Max: 98.6 (03 Aug 2022 00:21)  HR: 88 (03 Aug 2022 18:05) (86 - 99)  BP: 156/56 (03 Aug 2022 18:05) (121/77 - 192/69)  BP(mean): --  RR: 18 (03 Aug 2022 18:05) (16 - 20)  SpO2: 99% (03 Aug 2022 18:05) (94% - 99%)    Parameters below as of 03 Aug 2022 17:45  Patient On (Oxygen Delivery Method): room air        PE  NAD  Awake, alert  Anicteric, MMM  RRR  CTAB  Abd soft, NT, ND  No c/c/e  No rash grossly  FROM                          8.7    5.04  )-----------( 88       ( 03 Aug 2022 12:30 )             25.0       08-03    138  |  103  |  10  ----------------------------<  119<H>  4.0   |  26  |  0.64    Ca    8.7      03 Aug 2022 12:30  Phos  2.9     08-03  Mg     2.1     08-03    TPro  5.1<L>  /  Alb  2.5<L>  /  TBili  0.7  /  DBili  x   /  AST  21  /  ALT  17  /  AlkPhos  47  08-02      
HPI:  77 YOF admitted with GIB.  Transfused 2U PRBC, GI consulted.    OVERNIGHT EVENTS:  No new overnight events.  Seen and examined at bedside.     REVIEW OF SYSTEMS:      CONSTITUTIONAL: No fever, +fatigue  EYES: no acute visual disturbances  NECK: No pain or stiffness  RESPIRATORY: No cough; No shortness of breath  CARDIOVASCULAR: No chest pain, no palpitations  GASTROINTESTINAL: No pain. No nausea, vomiting or diarrhea   NEUROLOGICAL: No headache or numbness, no tremors  MUSCULOSKELETAL: No joint pain, no muscle pain  GENITOURINARY: no dysuria, no frequency, no hesitancy  PSYCHIATRY: no depression, no anxiety  ALL OTHER  ROS negative        Vital Signs Last 24 Hrs  T(C): 36.7 (02 Aug 2022 09:15), Max: 37.1 (02 Aug 2022 01:28)  T(F): 98 (02 Aug 2022 09:15), Max: 98.8 (02 Aug 2022 01:28)  HR: 88 (02 Aug 2022 09:15) (66 - 92)  BP: 125/65 (02 Aug 2022 09:15) (92/50 - 154/60)  BP(mean): --  RR: 18 (02 Aug 2022 09:15) (16 - 18)  SpO2: 98% (02 Aug 2022 09:15) (96% - 100%)    Parameters below as of 02 Aug 2022 09:15  Patient On (Oxygen Delivery Method): room air        ________________________________________________  PHYSICAL EXAM:    GENERAL: NAD  HEENT: Normocephalic; conjunctivae and sclerae clear;  NECK : supple, no JVD  CHEST/LUNG: Clear to auscultation; Nonlabored  HEART: S1 S2  regular  ABDOMEN: Soft, Nontender, Nondistended; Bowel sounds present  EXTREMITIES: no cyanosis; no LE edema; no calf tenderness  NERVOUS SYSTEM:  Alert; no new deficits  SKIN: warm and dry; No rashes or lesions    _________________________________________________  CURRENT MEDICATIONS:    MEDICATIONS  (STANDING):  pantoprazole  Injectable 40 milliGRAM(s) IV Push two times a day    MEDICATIONS  (PRN):  acetaminophen     Tablet .. 650 milliGRAM(s) Oral every 6 hours PRN Temp greater or equal to 38C (100.4F), Mild Pain (1 - 3), Moderate Pain (4 - 6)  aluminum hydroxide/magnesium hydroxide/simethicone Suspension 30 milliLiter(s) Oral every 4 hours PRN Dyspepsia  melatonin 3 milliGRAM(s) Oral at bedtime PRN Insomnia  ondansetron Injectable 4 milliGRAM(s) IV Push every 8 hours PRN Nausea and/or Vomiting      __________________________________________________  LABS:                          7.0    5.14  )-----------( 54       ( 02 Aug 2022 05:40 )             20.5     08-02    141  |  106  |  24<H>  ----------------------------<  97  3.0<L>   |  26  |  0.66    Ca    8.0<L>      02 Aug 2022 05:40  Phos  2.4     08-02  Mg     2.0     08-02    TPro  5.1<L>  /  Alb  2.5<L>  /  TBili  0.7  /  DBili  x   /  AST  21  /  ALT  17  /  AlkPhos  47  08-02    PT/INR - ( 01 Aug 2022 16:20 )   PT: 12.8 sec;   INR: 1.07 ratio         PTT - ( 01 Aug 2022 16:20 )  PTT:21.3 sec    CAPILLARY BLOOD GLUCOSE          __________________________________________________  RADIOLOGY & ADDITIONAL TESTS:    Imaging Personally Reviewed:  YES    < from: Xray Abdomen 1 View PORTABLE -Urgent (Xray Abdomen 1 View PORTABLE -Urgent .) (07.23.22 @ 11:39) >  IMPRESSION: Nonspecific nonobstructive bowel gas pattern.. No fecal   impaction.  Please see CT scan 7/20/2022    < end of copied text >    Consultant(s) Notes Reviewed:   YES     Plan of care was discussed with patient and /or primary care giver; all questions and concerns were addressed and care was aligned with patient's wishes.    Plan discussed with attending and consulting physicians.  
PGY-1 Progress Note discussed with attending    PAGER #: [[phone]] TILL 5:00 PM  PLEASE CONTACT ON CALL TEAM:  - On Call Team (Please refer to Katharine) FROM 5:00 PM - 8:30PM  - Nightfloat Team FROM 8:30 -7:30 AM    CHIEF COMPLAINT & BRIEF HOSPITAL COURSE:  Patient is a 77y old  Female pmhx of myelodysplastic disorder who presents with a chief complaint of Weakness (02 Aug 2022 22:33). Her Hb upon admission was 4.5, 2 packs of RBC was given which inreased Hb to 7 on 8/2. Hb was dropped to 6.5, patient was transfused with one more packed RBC, repeat Hb was elevated to 8.6.  On 8/3 at 5:52 AM patient has Hb of 8.4 and plt of 43.        INTERVAL HPI/OVERNIGHT EVENTS:   Patient was seen and examined at bedside. Patient had drop of K to 2.5 overnight, K chloride given.       OVERNIGHT EVENTS:  No new overnight events.  Seen and examined at bedside.     REVIEW OF SYSTEMS:      CONSTITUTIONAL: No fever, +fatigue  EYES: no acute visual disturbances  NECK: No pain or stiffness  RESPIRATORY: No cough; No shortness of breath  CARDIOVASCULAR: No chest pain, no palpitations  GASTROINTESTINAL: No pain. No nausea, vomiting or diarrhea   NEUROLOGICAL: No headache or numbness, no tremors  MUSCULOSKELETAL: No joint pain, no muscle pain  GENITOURINARY: no dysuria, no frequency, no hesitancy  PSYCHIATRY: no depression, no anxiety  ALL OTHER  ROS negative        Vital Signs Last 24 Hrs  T(C): 36.7 (03 Aug 2022 04:52), Max: 37.1 (02 Aug 2022 16:25)  T(F): 98.1 (03 Aug 2022 04:52), Max: 98.7 (02 Aug 2022 16:25)  HR: 90 (03 Aug 2022 04:52) (70 - 100)  BP: 186/63 (03 Aug 2022 04:52) (120/68 - 192/69)  BP(mean): --  RR: 19 (03 Aug 2022 04:52) (18 - 19)  SpO2: 99% (03 Aug 2022 04:52) (94% - 99%)    Parameters below as of 03 Aug 2022 04:52  Patient On (Oxygen Delivery Method): room air      PHYSICAL EXAM:    GENERAL: NAD  HEENT: Normocephalic; conjunctivae and sclerae clear;  NECK : supple, no JVD  CHEST/LUNG: Clear to auscultation; Nonlabored  HEART: S1 S2  regular  ABDOMEN: Soft, Nontender, Nondistended; Bowel sounds present  EXTREMITIES: no cyanosis; no LE edema; no calf tenderness  NERVOUS SYSTEM:  Alert; no new deficits  PGY-1 Progress Note discussed with attending    PAGER #: [[phone]] TILL 5:00 PM  PLEASE CONTACT ON CALL TEAM:  - On Call Team (Please refer to Katharine) FROM 5:00 PM - 8:30PM  - Nightfloat Team FROM 8:30 -7:30 AM    CHIEF COMPLAINT & BRIEF HOSPITAL COURSE:    INTERVAL HPI/OVERNIGHT EVENTS:       REVIEW OF SYSTEMS:  CONSTITUTIONAL: No fever, weight loss, or fatigue  RESPIRATORY: No cough, wheezing, chills or hemoptysis; No shortness of breath  CARDIOVASCULAR: No chest pain, palpitations, dizziness, or leg swelling  GASTROINTESTINAL: No abdominal pain. No nausea, vomiting, or hematemesis; No diarrhea or constipation. No melena or hematochezia.  GENITOURINARY: No dysuria or hematuria, urinary frequency  NEUROLOGICAL: No headaches, memory loss, loss of strength, numbness, or tremors  SKIN: No itching, burning, rashes, or lesions             PHYSICAL EXAMINATION:  GENERAL: NAD, well built  HEAD:  Atraumatic, Normocephalic  EYES:  conjunctiva and sclera clear  NECK: Supple, No JVD, Normal thyroid  CHEST/LUNG: Clear to auscultation. Clear to percussion bilaterally; No rales, rhonchi, wheezing, or rubs  HEART: Regular rate and rhythm; No murmurs, rubs, or gallops  ABDOMEN: Soft, Nontender, Nondistended; Bowel sounds present  NERVOUS SYSTEM:  Alert & Oriented X3,    EXTREMITIES:  2+ Peripheral Pulses, No clubbing, cyanosis, or edema  SKIN: warm dry                          8.4    4.33  )-----------( 43       ( 03 Aug 2022 05:52 )             24.6     08-03    139  |  101  |  12  ----------------------------<  109<H>  2.5<LL>   |  30  |  0.69    Ca    8.5      03 Aug 2022 05:52  Phos  2.9     08-03  Mg     2.1     08-03    TPro  5.1<L>  /  Alb  2.5<L>  /  TBili  0.7  /  DBili  x   /  AST  21  /  ALT  17  /  AlkPhos  47  08-02    LIVER FUNCTIONS - ( 02 Aug 2022 05:40 )  Alb: 2.5 g/dL / Pro: 5.1 g/dL / ALK PHOS: 47 U/L / ALT: 17 U/L DA / AST: 21 U/L / GGT: x               PT/INR - ( 01 Aug 2022 16:20 )   PT: 12.8 sec;   INR: 1.07 ratio         PTT - ( 01 Aug 2022 16:20 )  PTT:21.3 sec    CAPILLARY BLOOD GLUCOSE      RADIOLOGY & ADDITIONAL TESTS:                  SKIN: warm and dry; No rashes or lesions    _________________________________________________  CURRENT MEDICATIONS:    MEDICATIONS  (STANDING):  pantoprazole  Injectable 40 milliGRAM(s) IV Push two times a day    MEDICATIONS  (PRN):  acetaminophen     Tablet .. 650 milliGRAM(s) Oral every 6 hours PRN Temp greater or equal to 38C (100.4F), Mild Pain (1 - 3), Moderate Pain (4 - 6)  aluminum hydroxide/magnesium hydroxide/simethicone Suspension 30 milliLiter(s) Oral every 4 hours PRN Dyspepsia  melatonin 3 milliGRAM(s) Oral at bedtime PRN Insomnia  ondansetron Injectable 4 milliGRAM(s) IV Push every 8 hours PRN Nausea and/or Vomiting

## 2022-08-03 NOTE — PROGRESS NOTE ADULT - PROBLEM SELECTOR PLAN 3
Not on AC secondary to bleeding risk  Pt takes Carvedilol at home  Home regimen held in the setting of hypotension  Continue tele monitoring
Not on AC secondary to bleeding risk  Pt takes Carvedilol at home  Home regimen held in the setting of hypotension  Continue tele monitoring

## 2022-08-03 NOTE — DISCHARGE NOTE PROVIDER - HOSPITAL COURSE
77F, who lives at home, is ambulatory, and lives with a bedbound , and has 1 HHA, PMH of MDS initially in remission, now mutated, and complicated by severe anemia and thrombocytopenia requiring blood transfusions, last of which was last week during her hospital admission here now presenting for extreme fatigue and weakness. Admitted for acute, symptomatic anemia. Patient received a total of 3 units pRBC and 1 unit of platelet. EGD showed .... Patient's discharge was expedited so that patient was able to follow up with her hematologist/oncologist at [facility]. Patient is recommended to follow up with GI and hematologist as out patient.     Patient has a history of HTN, on amlodipine and carvedilol. Patient was continue to treated with home medications.   77F, who lives at home, is ambulatory, and lives with a bedbound , and has 1 HHA, PMH of MDS initially in remission, now mutated, and complicated by severe anemia and thrombocytopenia requiring blood transfusions, last of which was last week during her hospital admission here now presenting for extreme fatigue and weakness. Admitted for acute, symptomatic anemia. Patient received a total of 3 units pRBC and 1 unit of platelet. 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 Patient's discharge was expedited so that patient was able to follow up with her hematologist/oncologist at [facility]. Patient is recommended to follow up with GI and hematologist as out patient.     Patient has a history of HTN, on amlodipine and carvedilol. Patient was continue to treated with home medications.

## 2022-08-03 NOTE — PROGRESS NOTE ADULT - PROBLEM SELECTOR PLAN 1
P/W worsening fatigue and weakness, Hg 4.5 on admission, anemia likely in setting of Myelodysplastic syndrome given severe thrombocytopenia in addition to anemia  -s/p 2 PRBC and 1 platelet transfusion   -+ve FOBT, given reports of dark stools, plan for EGD today  -Continue NPO after midnight   -H/H 8.4/24.6 today   -Platelets 43, transfuse 1 unit of platelets prior to EGD  -Supplement K and repeat BMP after supplementation    -Trend CBC   -Continue PPI   -COVID negative 8/1  -Transfuse PRN for Hg <7  -Patient reports having an endoscopy and colonoscopy in April at [facility] with normal results. Pt also had a capsule endoscopy done about 2 months ago which was also unremarkable as per pt, please obtain records for outpt EGD/Colonoscopy. P/W worsening fatigue and weakness, Hg 4.5 on admission, anemia likely in setting of Myelodysplastic syndrome given severe thrombocytopenia in addition to anemia  -s/p 2 PRBC and 1 platelet transfusion   -+ve FOBT, given reports of dark stools, plan for EGD today  -Continue NPO after midnight   -H/H 8.4/24.6 today   -Platelets 43, transfuse 1 unit of platelets prior to EGD  -Supplement K and repeat BMP after supplementation    -Trend CBC   -Continue PPI   -COVID negative 8/1  -Transfuse PRN for Hg <7  -EDG/Colonoscopy and capsule endoscopy report obtained from [facility]

## 2022-08-03 NOTE — PATIENT PROFILE ADULT - FALL HARM RISK - HARM RISK INTERVENTIONS

## 2022-08-03 NOTE — DISCHARGE NOTE PROVIDER - NSDCCPCAREPLAN_GEN_ALL_CORE_FT
PRINCIPAL DISCHARGE DIAGNOSIS  Diagnosis: Upper GI bleed  Assessment and Plan of Treatment: You have PMH of MDS, initially in remission, now mutated, and complicated by severe anemia and thrombocytopenia requiring blood transfusions, last of which was last week during her hospital admission here, now presented for extreme fatigue and weakness. You were admitted for acute, symptomatic anemia. You received a total of 3 units pRBC and 1 unit of platelet. EGD showed _______. Your discharge was expedited so that You will be able to follow up with your hematologist/oncologist at [facility]. Please follow up with GI and hematologist as outpatient within 1-2 weeks.        SECONDARY DISCHARGE DIAGNOSES  Diagnosis: Hypertension  Assessment and Plan of Treatment: You have a history of HTN, on amlodipine and carvedilol. You were continue to treated with home medications. Please continue to take your home medications.    Diagnosis: Myelodysplastic syndrome  Assessment and Plan of Treatment: as above     PRINCIPAL DISCHARGE DIAGNOSIS  Diagnosis: Upper GI bleed  Assessment and Plan of Treatment: You have PMH of MDS, initially in remission, now mutated, and complicated by severe anemia and thrombocytopenia requiring blood transfusions, last of which was last week during her hospital admission here, now presented for extreme fatigue and weakness. You were admitted for acute, symptomatic anemia. You received a total of 3 units pRBC and 1 unit of platelet. 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 Your discharge was expedited so that You will be able to follow up with your hematologist/oncologist at [facility]. Please follow up with GI and hematologist as outpatient within 1-2 weeks.  PLEASE TAKE PROTONIX 40MG TWICE A DAY. PLEASE RETURN TO THE ED IF YOU HAVE         SECONDARY DISCHARGE DIAGNOSES  Diagnosis: Myelodysplastic syndrome  Assessment and Plan of Treatment: as above    Diagnosis: Hypertension  Assessment and Plan of Treatment: You have a history of HTN, on amlodipine and carvedilol. You were continue to treated with home medications. Please continue to take your home medications.     PRINCIPAL DISCHARGE DIAGNOSIS  Diagnosis: Upper GI bleed  Assessment and Plan of Treatment: You have PMH of MDS, initially in remission, now mutated, and complicated by severe anemia and thrombocytopenia requiring blood transfusions, last of which was last week during her hospital admission here, now presented for extreme fatigue and weakness. You were admitted for acute, symptomatic anemia. You received a total of 3 units pRBC and 1 unit of platelet. 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 Your discharge was expedited so that You will be able to follow up with your hematologist[place] [facility] Please follow up with GI and hematologist as outpatient within 1-2 weeks.  PLEASE TAKE PROTONIX 40MG TWICE A DAY. PLEASE RETURN TO THE ED IF YOU HAVE ANY BLEEDING, SHORTNESS OF BREATH OR DIZZINESS      SECONDARY DISCHARGE DIAGNOSES  Diagnosis: Myelodysplastic syndrome  Assessment and Plan of Treatment: as above    Diagnosis: Hypertension  Assessment and Plan of Treatment: You have a history of HTN, on amlodipine and carvedilol. You were continue to treated with home medications. Please continue to take your home medications.

## 2022-08-03 NOTE — PROGRESS NOTE ADULT - ASSESSMENT
GIB  - fu gi recs  s/p egd ,   large avm requiring apc and hemostatic clips  monitor h/h     MDS -- recurrent MDS, has an appt with [facility] this Thurs that she really wants to make  -- s/p C2 azacitadine in office  -- monitor CBC at this time  -- transfuse for hgb <7, plts <50 for bleeding  -- s/p 1 unit prior to egd, plt count 88k today  -- f/u Dr Mosley after d/c    afib -- not on AC due to GIB      will follow  
78 yo female who lives at home, is ambulatory, and lives with a bedbound , and has 1 HHA, PMH of MDS initially in remission, now mutated, and complicated by severe anemia and thrombocytopenia requiring blood transfusions, last of which was last week during her hospital admission here now presenting for extreme fatigue and weakness. Admitted for symptomatic anemia, +ve FOBT,  GI consulted for suspected GI bleed
Patient is a 76 yo female who lives at home, is ambulatory, and lives with a bedbound , and has 1 HHA, PMH of MDS initially in remission, now mutated, and complicated by severe anemia and thrombocytopenia requiring blood transfusions, last of which was last week during her hospital admission here now presenting for extreme fatigue and weakness concerning for acute GI Bleed.
Patient is a 76 yo female who lives at home, is ambulatory, and lives with a bedbound , and has 1 HHA, PMH of MDS initially in remission, now mutated, and complicated by severe anemia and thrombocytopenia requiring blood transfusions, last of which was last week during her hospital admission here now presenting for extreme fatigue and weakness concerning for acute GI Bleed.

## 2022-08-03 NOTE — PROGRESS NOTE ADULT - PROBLEM SELECTOR PLAN 1
Hgb 8.4 on 8/3 post transfusion of total 3 packed RBC   Transfuse Hgb < 7  Avoid ASA and NSAIDs  Trend CBC  - GI consulted   - for endoscopy on 8/3   -  PLT decreased to 43   - if PLT <50 transfuse plt before endoscopy

## 2022-08-03 NOTE — DISCHARGE NOTE PROVIDER - CARE PROVIDER_API CALL
Amarilys Mosley  INTERNAL MEDICINE  [address]  [place] [id number]  Phone: [phone]  Fax: [phone]  Follow Up Time: 1 week    Sudhir Gerard)  Gastroenterology; Internal Medicine  [address] [facility], [address] [place] [address] [place] [address]  Phone: [phone]  Fax: [phone]  Follow Up Time: 2 weeks

## 2022-08-03 NOTE — PROGRESS NOTE ADULT - ATTENDING COMMENTS
HPI:  Patient is a 78 yo female who lives at home, is ambulatory, and lives with a bedbound , and has 1 HHA, PMH of MDS initially in remission, now mutated, and complicated by severe anemia and thrombocytopenia requiring blood transfusions, last of which was last week during her hospital admission here now presenting for extreme fatigue and weakness. She was going up stairs when she suddenly felt very weak and had to sit down. Her daughter called a neighbor who helped carry her to sit before she arrived at the hospital. She denies any new chest pain, SOB, abdominal pain, nausea, vomiting and diarrhea. She says her bowel movements have been brown; no blood while at home.     During her last admission, pt was hemodynamically stable, afebrile. She received 2 more units of blood on 7/22 due to a Hb <7. Platelets stayed above 50, so no additional platelet transfusion was indicated by Dr. Mosley, her hematologist. GI, cardiology, and hematology was following her throughout her hospital stay. GI thought that it was not indicated to do a colonoscopy/endoscopy (recently had both done 1 month ago at [facility]), and the results were normal. There was also a low suspicion for a GI bleed since the pt. was symptomatically improving and no longer endorsing diarrhea or dark stools/bloody stools. They recommended PPI BID and close monitoring of H/H. On 7/25 her Hb was 7.1, so Dr. Mosley wanted her to receive 1 unit of pRBCs, and then discharge home. Her afib with RVR was treated with metroprolol.     Since then, she was told to stop her metoprolol and go back to her regimen of Amlodipine 10mg and Carvedilol 25 mg, of which she started today. She is scheduled to have a follow up appointment with her hematologist at [facility] this Thursday 8/4.    In the ED, she had a bowel movement which was black, FOBT was found to be positive. CBC was notable for Hgb of 4.5.     (01 Aug 2022 19:46)    # CASE D/W HOLLY PETERSEN @  [8/3] - ALL QUESTIONS ANSWERED    # R/O GI BLEED  # NORMOCYTIC ANEMIA, MDS  # THROMBOCYTOPENIA   # CHRONIC CONSTIPATION, HX OF HEMORRHOID  - GIVEN 3 UNITS PRBC,1 UNITS PLATELETS   - F/U POST TRANSFUSION CBC, MONITOR SYMPTOMS CLOSELY DURING TRANSFUSION  - TREND HGB, PPI IV BID  - TRANSFUSION THRESHOLD HGB < 7  - TRANSFUSION THRESHOLD FOR PLT < 10 [IF NO BLEEDING], < 50 [IF BLEEDING]    - CLD , NPO AFTER MIDNIGHT    - GASTROENTEROLOGY CONSULT  - HEME/ONC CONSULT    - EGD/COLONOSCOPY IN 2022 AND ?CAPSULE ENDOSCOPY - WILL OBTAIN RECORDS    - PLANNED FOR EGD 8/3    - LOW THRESHOLD FOR CRITICAL CARE CONSULT, D/W RESIDENT TEAM    # CHRONIC A.FIB  - PRN RATE CONTROL  - MONITOR ON TELEMETRY  - F/U ECHOCARDIOGRAM  - HOLD A/C GIVEN CONCERN FOR BLEEDING; DISCUSSED WITH PATIENT AND WITH DAUGHTER REGARDING BLEEDING RISK VS. STROKE RISK. BOTH VERBALIZED UNDERSTANDING   - HOLDING METOPROLOL   - CARDIOLOGY CONSULT      # SUSPECT MODERATE PROTEIN CALORIE MALNUTRITION  - NUTRITIONAL SUPPLEMENTATION    # HYPOKALEMIA  - REPLETE WITH SUPPLEMENT  - MONITORING CLOSELY    # GI PPX; AT [facility]

## 2022-08-03 NOTE — DISCHARGE NOTE PROVIDER - PROVIDER TOKENS
PROVIDER:[TOKEN:[[id number]],FOLLOWUP:[1 week]],PROVIDER:[TOKEN:[[id number]],FOLLOWUP:[2 weeks]]

## 2022-08-03 NOTE — PROGRESS NOTE ADULT - PROBLEM SELECTOR PLAN 2
Complicated by anemia requiring frequent transfusions  HEME/ONC, Dr. Mosley/Juni, following
Complicated by anemia requiring frequent transfusions  HEME/ONC, Dr. Mosley/Juni, following

## 2022-08-03 NOTE — PROGRESS NOTE ADULT - PROBLEM SELECTOR PLAN 4
Pt takes Carvedilol at home  Home regimen held in the setting of hypotension  Monitor BP
Pt takes Carvedilol at home  Home regimen held in the setting of hypotension  Monitor BP

## 2023-01-16 NOTE — ED PROVIDER NOTE - NSCAREINITIATED _GEN_ER
Daily Note     Today's date: 2023  Patient name: Sonja Crain  : 1959  MRN: [id number]  Referring provider: Heriberto Carlos MD  Dx:   Encounter Diagnosis     ICD-10-CM    1  Primary osteoarthritis of right hip  M16 11       2  History of total hip replacement, right  Z96 641                      Subjective:   Still some intermittent R posterolateral hip pain  Objective: See treatment diary below  Gait: (+) R lateral trunk lean    Assessment: Tolerated treatment well  Patient still with functional abductor weakness  Patient would benefit from continued course of skilled physical therapy to address impairments in an effort to improve function          Plan: Continue per plan of care          Manuals 23             Gentle PROM R hip within precautions( FF, ABD, ER)  RGRG            Gentle Manual R hip flexor stretch off edge of table  RG             Neuro Re-Ed                        Mini squats on foam pad  2 x 15             Slow march with movement YMB  50' x 6              Side stepping at rail (band at knees): limit valgus  Green  25' x 8              Walk with weight in L hand for R hip abductor strengthening   10#  50' x 6                                                                                     Ther Ex                       Upright Bike   St11 10 min  L3             Hooklying ABD  Purple  5sec  X 30            Bridge with Tband ABD Purpk3  3sec  2x 15             LAQ  6#  3sec  2 x 15             SAQ  6#  3sec  2 x 15            Tband HS curl Blue  2 x 15            Step up R 6"  2 x 10            Step up and over 6"   2x 10            Leg press st 12 85#  3 x 12            SL Press (R) 45#  3 x 10            Clamshells 2 x 10            SLR ABD 0#  2 x10            Stand at rail : hip abduction 3#  2 x 15            Stand at rail: March 3#  2 x 15            SLR flexion 0#  2 x 10                                                   Ther Activity                                                                      Gait Training                                                                  Modalities                       Cryo prn  10 min in sitting                            Atif Castro(Attending)

## 2023-01-19 NOTE — CONSULT NOTE ADULT - REASON FOR ADMISSION
anemia Albendazole Counseling:  I discussed with the patient the risks of albendazole including but not limited to cytopenia, kidney damage, nausea/vomiting and severe allergy.  The patient understands that this medication is being used in an off-label manner.

## 2023-03-10 NOTE — ED ADULT NURSE NOTE - TEMPLATE LIST FOR HEAD TO TOE ASSESSMENT
[facility] Clinic Note   HISTORY    Aquiles is a 15 year old female who comes in after falling at swim practice yesterday.  Was running to get in a photo and either tripped/slipped on a small ledge and fell and struck head. No bleeding. No loss of consciousness. Did not cry.  Her teammates witnessed this and confirmed no LOC.  She remembers the event clearly and the events before and after.     She did not have any emotional lability or nausea or dizziness immediately.  She went home and did feel somewhat more fatigued than normal. She noted slight nausea last night.  She took acetaminophen and went to bed and slept well.  Dad did do checks during the night.      Felt somewhat overwhelmed this morning, no crying.  She did go to school.  She is noting headpain today where she struck her head.  Some fatigue today.  Feels like things are moving a little slowly.   Noting some difficulty focusing.      While working in class she is noting dizziness, this is worse when getting up to go between classes.     PMH: no history of concussion. She is being evaluated for anxiety which seems to be sports related.    Patient Active Problem List   Diagnosis   • Environmental allergies       No current outpatient medications on file.     No current facility-administered medications for this visit.       Allergies as of 03/10/2023   • (No Known Allergies)       PHYSICAL EXAM  Visit Vitals  Pulse 60   LMP 04/01/2022 (Approximate)   SpO2 100%     Well appearing, no apparent distress.  TM's and canals normal appearing bilaterally.   Oropharynx without erythema or exudate. No petechiae of the palate.  Neck supple without lymphadenopathy.  Coronary exam reveals a regular rate and rhythm without murmur or extra sounds.   Lungs clear to auscultation without wheeze or crackles.   Neuro exam CN II - XII intact to gross exam.  Fundoscopic exam christopher.    SAC and SCAT assessed:   Orientation 5/5, Immediate memory 5/5, concentration 1/4,  delayed recall 3/5, (scat symptoms 14/22, scat severity 46/132)   Balance testing 20 sec: normal with both feet and eyes closed, one knee bent and eyes closed : one balance check/foot down, tandem feet and eyes closed: normal.      ASSESSMENT/PLAN  Mild concussion    Recommend rest x 24 to 48 hours minimum, once symptom free can progress according to stepwise care.   Avoid screens, avoid mentally straining activities until improving. Recheck in 5-7 days or sooner if not improving or with worsening.  Concussion handouts given.  If worse, if any concerning symptoms or neurologic symptoms then seek emergent care.        [facility]  SUPERVISING PHYSICIAN: QUINCY REED MD     General

## 2023-03-13 NOTE — ED ADULT NURSE NOTE - NS ED PATIENT SAFETY CONCERN
Information from patient questionnaire entered by Andre Cintron.





I have reviewed and concur with the information entered by Andre Cintron. This 

document represents the service I personally performed and the decisions made by

me, Yash Gupta MD, [facility].





History of Present Illness


Service Date and Time: 03/13/2023    1425


Reason for follow up: first compliance


Equipment type: CPAP (RESMED NEED MACHINE)


Prior sleep studies: No


Type of Sleep Study: Polysomnography (COMPLETED 09/24/2022)


HPI additional information: 


Mr. Cates was diagnosed to have upper airway resistance syndrome and returns

today for follow up of CPAP therapy.  The patient purchased the device from 

[employer]. and was fitted with a ResMed F20 full face 

mask because he breathes through his mouth at night (he tried a nasal mask at 

first).  He uses the AirSense 11 almost nightly and all through the night. The 

compliance report shows that he uses the device 27 nights out of the past 30 

nights, averaging 7 hours a night.  He complains of no particular problem with 

the device such as soreness on the face, dry nose, epistaxis, nasal congestion 

or headache.  He thinks that the pressure of 4 - 12 cmH2O is comfortable.  On 

the CPAP therapy he notices improvement in his sleep quality, and that he wakes 

up feeling fresher in the morning and more awake/alert during the day.  His wife

notices no snore at all.  [place] Sleepiness Scale score is 8.  The average 

residual AHI is 0.9; and average time in large leak per day is 0 minutes a 

night.  The 90th percentile pressure is 7.5 cmH2O. 








Sleep Study





- Results


Type of Sleep Study: Polysomnography (COMPLETED 09/24/2022)


Prior sleep studies: No





Subjective


Initial Harlingen Sleepiness Scale score: 10 (9/12/22)


Current Har[place]en Sleepiness Scale score: 8 (03/13/23)





Allergies and Home Medications


Drug allergies reviewed: Yes


Home medication list reviewed: Yes


Allergy and home medication list: 


Allergies





No Known Drug Allergies Allergy (Verified 11/28/22 13:24)


   











Review of Systems


Review of systems same as previous: Yes





Physical Exam


Vital signs obtained and entered by: ANDRE ABRAHAM MA


Blood Pressure: 102/62 (LEFT ARM)


Cuff size: regular


Heart Rate: 85


O2 Saturation: 87


Height: 5 ft 6 in


Weight: 147 lb


Body Mass Index: 23.7


BMI Classification: Normal





Impression and Plan


IMPRESSION: 1. Upper airway resistance syndrome (RDI = 6.1), with the patient 

continuing to do well on nasal CPAP therapy.  He has excellent compliance and 

significant clinical benefits.  The current pressure appears effective and 

comfortable.  Overall, he is very satisfied with treatment and plans to continue

with it long-term.  No adjustment is necessary today.  





PLAN:      1.   Continue with autoCPAP set at 4 - 12 cm H2O.


2.   Try to lose weight. 


3.   Return in one year for follow up or earlier if there is any problem with 

the treatment.





Continue with device pressure at (cmH2O): 4 - 12 cmH20


Follow up with Sleep Care in: 1 year


Visit Type: In Office


Time Spent with Patient (minutes): 15


Provider Statement: I spent 100% of the Face to Face Visit with the patient with

greater than 50% spent counseling the patient and coordination of care.
No

## 2023-04-10 PROBLEM — Z00.00 ENCOUNTER FOR PREVENTIVE HEALTH EXAMINATION: Status: ACTIVE | Noted: 2023-04-10

## 2023-05-03 NOTE — ED ADULT NURSE NOTE - NSIMPLEMENTINTERV_GEN_ALL_ED
Implemented All Universal Safety Interventions:  [place] to call system. Call bell, personal items and telephone within reach. Instruct patient to call for assistance. Room bathroom lighting operational. Non-slip footwear when patient is off stretcher. Physically safe environment: no spills, clutter or unnecessary equipment. Stretcher in lowest position, wheels locked, appropriate side rails in place. Bcc Histology Text: There were numerous aggregates of basaloid cells.

## 2023-06-12 NOTE — ED ADULT NURSE NOTE - NS ED NURSE LEVEL OF CONSCIOUSNESS MENTAL STATUS
Subjective     Patient ID: Davis Mosqueda is a 65 y.o. male.    Chief Complaint: Back Pain (Low right back/hip pain due to fall.) and Medication Refill (Didn't bring medication today.)    Fell over a week ago. Still in pain. Also forgot to take his insulin this morning. Having pain in coccygeal area and right hip    Back Pain  Pertinent negatives include no chest pain, fever or headaches.   Medication Refill  Pertinent negatives include no arthralgias, chest pain, coughing, fatigue, fever, headaches, myalgias, nausea or vomiting.   Review of Systems   Constitutional:  Negative for fatigue and fever.   HENT:  Negative for dental problem.    Eyes:  Negative for discharge.   Respiratory:  Negative for cough, choking, chest tightness and shortness of breath.    Cardiovascular:  Negative for chest pain and leg swelling.   Gastrointestinal:  Negative for constipation, diarrhea, nausea and vomiting.   Genitourinary:  Negative for discharge and flank pain.   Musculoskeletal:  Positive for back pain. Negative for arthralgias and myalgias.   Allergic/Immunologic: Negative for environmental allergies.   Neurological:  Negative for headaches and memory loss.   Psychiatric/Behavioral:  Negative for behavioral problems and hallucinations.         Objective     Physical Exam  Vitals and nursing note reviewed.   Constitutional:       Appearance: Normal appearance. He is normal weight.   HENT:      Head: Normocephalic and atraumatic.      Right Ear: Tympanic membrane normal.      Left Ear: Tympanic membrane normal.      Nose: Nose normal.      Mouth/Throat:      Mouth: Mucous membranes are moist.   Eyes:      Extraocular Movements: Extraocular movements intact.      Conjunctiva/sclera: Conjunctivae normal.      Pupils: Pupils are equal, round, and reactive to light.   Cardiovascular:      Rate and Rhythm: Normal rate and regular rhythm.      Pulses: Normal pulses.   Pulmonary:      Effort: Pulmonary effort is normal.      Breath  sounds: Normal breath sounds.   Abdominal:      General: Abdomen is flat. Bowel sounds are normal.      Palpations: Abdomen is soft.   Musculoskeletal:         General: Normal range of motion.      Cervical back: Normal range of motion and neck supple.      Comments: Tender over right hip and coccyx. Multiple site arthritis   Skin:     General: Skin is warm and dry.   Neurological:      General: No focal deficit present.      Mental Status: He is alert and oriented to person, place, and time.   Psychiatric:         Mood and Affect: Mood normal.          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     Ref. Range 2/29/2016 11:14   Glucose Latest Range:  mg/dL 228 (H)   GAD65 Ab, Serum Latest Range: <=1.0 U/mL <1.0   C-Peptide Latest Range: 0.90-4.30 ng/ml 0.01 (L)       Last Assessment & Plan:   Formatting of this note is different from the original.     Ref. Range 9/24/2010 13:50   GAD65 Ab, Serum Latest Range: <= 0.02 nmol/L 0.00    Orders:  -     CBC Auto Differential; Future; Expected date: 06/12/2023  -     Comprehensive Metabolic Panel; Future; Expected date: 06/12/2023  -     Hemoglobin A1C; Future; Expected date: 06/12/2023  -     insulin lispro injection 20 Units    2. Hypertension, unspecified type  -     CBC Auto Differential; Future; Expected date: 06/12/2023  -     Comprehensive Metabolic Panel; Future; Expected date: 06/12/2023    3. Dyslipidemia  -     Lipid Panel; Future; Expected date: 06/12/2023    4. Dilated cardiomyopathy  -     furosemide (LASIX) 20 MG tablet; Take 1 tablet (20 mg total) by mouth once daily.  Dispense: 90 tablet; Refill: 3    5. Arthritis  -     ketorolac injection 60 mg    6. Contusion of right hip, subsequent encounter  -     ketorolac injection 60 mg        Called Dr. Rincon's office (pt's pain management doctor)  to see next week. Can have a shot. Cannot have any pills.  RTC as needed  To  take home meds         No follow-ups on file.     Awake/Alert

## 2023-07-14 NOTE — ED ADULT TRIAGE NOTE - PATIENT ON (OXYGEN DELIVERY METHOD)
July 17, 2023                           [facility]  [address] [facility] [address]            Dear Ms. Tabares,    Please review the enclosed prep instructions for your procedure with Howie Dozier MD.    Procedure(s):  Colonoscopy   Date of Procedure: Friday, September 29, 2023  Time of Procedure: Someone from the facility will call you 2-3 days prior with your procedure and arrival times.    Location:    [address] [address]  [phone]  Please take elevator to 2nd Floor (Surgery Center)    : Open Access Scheduling Patient Line [phone]    Please read these instructions very carefully at least 7 days prior to your procedure.     If there are questions about these instructions, please call the office at [phone]. Prior to the procedure, the procedure center will be calling to go over your health history and medications to be taken on the day of the procedure. If you need to cancel or reschedule, please give the office a call within 3 days prior to the procedure.     PATIENT CHECKLIST     Blood Thinner(s) - Please contact the prescribing provider to get clearance to hold prior to procedure the following blood thinner(s):  Aspirin does not need to be held, please continue taking it.  Diabetics - Contact your primary physician or my office for instructions on your diabetic medications, including insulin. Check your blood sugars frequently the day you are on a clear liquid diet. In general, oral diabetic medications and regular insulin (NovoLog) are held on the morning of the procedure.  Important Requirements - You will not be allowed to drive home after the procedure due to the sedation. Please confirm you have an escort to take you home following the procedure. You cannot take a taxi cab, Uber, Lyft, ride share, bus, or other public transport home by yourself. Patients without an appropriate ride home will be  cancelled.    Additional Information:  Please be on time, as arriving late could result in your procedure being delayed, canceled, or rescheduled.  You are scheduled with Monitored Anesthesia Care (MAC) sedation and will need to have a legal adult (18 years or older) stay with you overnight the day of your procedure or your procedure will be canceled. 7-10 Days prior to your procedure, hold the medication, Phentermine, if you are taking it.          Colonoscopy Instructions - Suprep    7 Days prior to your procedure   your bowel prep at your pharmacy at least one week prior to your procedure in case there is a problem with coverage of the medication by your insurance.  If possible, try to avoid non-steroidal anti-inflammatory drugs (Ibuprofen, Advil, Motrin, Aleve, Naproxen etc.).   Stop taking oral iron supplements.  Multi-vitamins with iron - OK to continue.  Do not eat popcorn, seeds, nuts or whole kernel corn.     5 Days prior to your procedure  Do not eat products with [place] (a fat substitute found in [employer] and [employer] Fat-Free chips).    1 Day before your procedure  No solid food.  No alcohol.  Clear liquids ALL DAY. If you can see through it, you can drink it. Examples are clear broth or consommé, fruit juices without pulp (no orange or tomato), sports drinks (Gatorade, Propel etc.), Jell-O (no fruit added), coffee or tea (sweeteners are ok, no milk or cream), soda or non-alcoholic carbonated drinks, popsicles, water, and clear hard candies. Avoid red and purple colors.   It is important to try and stay hydrated during the day by drinking fluids. A solution such as Gatorade, or a similar sports drink, will help you to stay hydrated.  At 5:00 PM, pour one (1) 6-ounce bottle of Suprep liquid into the mixing container. Add cool drinking water to the 16-ounce line on the container and mix. Drink all the liquid in the container. Then drink two (2) more 16-ounce containers of water over  the next 1 hour.   You can still continue to be on the clear liquid diet while prepping.      Day of the procedure  No solid food. No alcohol.  6 hours prior to leaving the house, pour one (1) 6-ounce bottle of Suprep liquid into the mixing container. Add cool drinking water to the 16-ounce line on the container and mix. Drink all the liquid in the container. Then drink two (2) more 16-ounce containers of water over the next 1 hour.  You can take your medications as instructed during phone call with procedure center staff, with a sip of water up to 4 hours prior to your scheduled procedure.   You should not drink or take anything by mouth 4 hours prior to your procedure.  Your stools should have a clear to see-through cloudy yellow appearance with no formed substance. If your stools are darker or have formed substance, please call the office and speak with a nurse who will get further instructions from your physician.  Please make sure to drink the full prep.    Prepping times and instructions can be altered depending on time of procedure.  Please call office and ask to speak to a nurse to discuss.          Special Instructions    Kidney disease - If you have any problems with kidney disease. You should not take this prep. Please call my office, and we will prescribe a safer alternative    Frequently Asked Questions    What is a colonoscopy?   A colonoscopy is a procedure where we can examine your large intestine for abnormalities. We use an endoscope which is a flexible tube as thick as your finger, that has a camera and light at the tip     How long is the colon?   The average length of the colon is 4 to 5 feet.     Why do I need to take the preparation and clear liquid diet?  The most important part in a successful exam is the preparation. It is important to clean your colon completely prior to the exam. If there is still remaining stool in the colon, it can prolong your procedure, and we may not be able to  visualize the entire colon and lesions could be missed. If you have a substantial amount of stool remaining, the procedure may be terminated, and a repeat or alternative prep may be required.    For your information, studies have shown taking Suprep the evening prior and morning of the procedure as 2 separate doses help improve the cleansing of the colon. Therefore, prepping times and instructions can be altered depending on time of procedure.    Is there an alternative to the Suprep?  In the past, most preps were performed with Fleets Phospho-Soda. However recent studies have shown the risk of causing permanent kidney damage/failure. Due to the risk of permanent kidney damage with the Fleet Phospho-Soda, most gastroenterologists have stopped using it.   There are alternatives to Suprep such as Nulytely and Moviprep.  If you would like the alternative, then please contact my office.      If people have problems taking the prep at home, there is an alternative where you can take the entire preparation in the hospital the day of the procedure.     Can I take all my medications?  Most medications can be continued without problems.  If you have questions, please call the office. Blood thinners and anti-diabetic medications may need modification prior as detailed above.      What can I expect the day of the procedure?  You will arrive at the facility where you are scheduled. We have you arrive early since you will need to fill out your information. An IV will be placed so we can give you medications. I will talk to you and answer any questions you may have before the procedure. Once inside the procedure room, sedation will be given to help you relax.    You will usually lie on your left side. I will then advance the scope to the end of your large intestine. During the procedure it is normal to feel some pressure, bloating or cramping. Careful examination will be performed throughout your colon. The entire procedure takes  approximately 30 minutes, however this can be prolonged in complicated cases.    Once the procedure is complete, you will be brought to the recovery room until you are stable to leave. You should plan on being at the procedure site for 2 to 3 hours.     Will I be completely sedated for the procedure?  Choice of sedation - Moderate Intravenous OR Monitored Anesthesia Care, is based upon past medical history and current medication use. The doctor performing the procedure will make this decision. The goal is to keep you comfortable during the procedure.    If you have questions regarding sedation, please call the office to discuss further.  In most cases patients receive conscious sedation, meaning that they will be in a “twilight sleep”. They will breathe on their own and will be able to follow commands. Since most of the discomfort is with inserting the scope, this is when the heaviest sedation is used. It is not unusual for people to be awake for part of the exam. The goal of the sedation is to keep you as comfortable as possible.     Patients that have a history of taking chronic medications such as pain medication, anti-anxiety medications, or alcohol use may build up a tolerance to the sedation. This may make it difficult to fully sedate you for the procedure.    What if something abnormal is found during the colonoscopy?  Most polyps can be removed at the time of the colonoscopy. Biopsies, tissue samples, can be obtained during the exam.     What are polyps?  Polyps are abnormal growth of colon tissue. A majority are benign or non-cancerous. All polyps that are removed are sent to the lab for analysis. Some polyps are precancerous, which is why it is important to remove them while they are small and non-cancerous. Polyps can range from a couple of millimeters to a couple of cm.     How are the polyps removed?  The polyps can be removed by biopsy instruments. Some polyps are removed with a wire snare and cautery.  Cautery produces an electrical current to help burn and remove the polyps. You should not feel any pain with removal of the polyps. The polyps are then retrieved and sent to a lab to be analyzed.     What will happen after the procedure?  I will discuss the findings with you prior to discharge. You will receive a phone call or letter from the office within 10-14 business days with the results and recommendations. Your family history, your personal history, and the number/size and type of polyps found on most recent exam will determine when you may need a repeat colonoscopy.     Even if you feel alert after the procedure, your judgment and reflexes may be impaired the rest of the day. You should not drive, work heavy machinery, or perform any other task that requires your full attention.    You may have bloating and cramping after the exam. This usually is improved with passing of gas.    Normally you may resume a regular diet after the procedure is completed. If you are on a blood thinner, this may be held if a large polyp is removed. You will be provided with clear instructions regarding when to resume your blood thinner medications.    Why do I need someone to accompany me to the exam?  Because you are given a sedative, you need someone you know to drive you home after the exam. If you are taking a bus, taxi or van service a responsible adult you know must accompany you. If this is a problem, the colonoscopy can be attempted without any sedation, or inpatient observation may be recommended.     What are the complications of the exam?  Colonoscopies are relatively safe, however complications can occur. Some, but not all, of the risk are discussed below.  Some patients may have an adverse reaction with the sedation or complications from heart and lung disease. There is also the risk of causing bleeding and perforation (poking a hole in the colon). If these complications do occur, they may need surgical treatment  rarely. There is also a risk of missed lesions.     After the procedure, if you have any abdominal pain, fever, chills, or rectal bleeding it is important to notify me or seek immediate medical attention.  It is important to know that bleeding can occur even several days after the procedure.             INSURANCE COVERAGE REGARDING PAYMENT  FOR YOUR COLONOSCOPY        Colon Cancer is the second leading cause of death among cancers, per the American Cancer Society. It is preventable. Early detection is the key. Your doctor will determine which tests need to be done for prevention and/or treatment. However, colonoscopies can be performed for several reasons:     Screening To screen for any problems within the colon. In this case, the patient is not symptomatic and does not have a personal history of previous colon cancer/condition or abnormal findings. Billed as screening.   Surveillance To monitor for a previously diagnosed colon condition (such as polyps) or when performed at more frequent intervals than every ten years because the patient has a personal/family history of colonic polyps or colon cancer. Billed as diagnostic.   Diagnostic Patient with symptoms, used to evaluate the colon. Billed as diagnostic.       If during a screening colonoscopy, our physician finds an abnormality, performs a biopsy or polypectomy (removal of polyp), your insurance company may consider the procedure to be a diagnostic exam and no longer a screening procedure.     Every insurance company is different. We encourage you to call your insurance company regarding plan benefits. Generally, screening benefits and diagnostic benefits may be paid at different levels. Charges associated with anesthesia or type of facility may also be processed differently. This varies with each insurance company, so we want you to be aware of this prior to your procedure. You do not have to call your insurance company if you have Medicare. For an estimate  of potential charges, you may call the [facility] Patient Contact Center at [phone], option 5.     The authorization staff at [facility] will contact your insurance company to check precertification requirements for your colonoscopy. However, precertification, which serves as notification is never a guarantee of payment. If you have questions regarding precertification for your procedure, please contact your insurance company.   room air

## 2023-08-14 NOTE — PATIENT PROFILE ADULT - NSPROEXTENSIONSOFSELF_GEN_A_NUR
Phone call from pt stating she only has \"one day left on insulin\"  and asked to have a refill called into [facility] in [place], [place] called into above pharmacy, [phone], Shanice, NPH insulin, 40u at bedtime, 1 week supply with needles and no refills. none

## 2023-08-17 NOTE — ED ADULT NURSE NOTE - OBJECTIVE STATEMENT
Advise patient came to the ED a/o x 3 ambulates sent by PMD for low blood count/ blood transfusion. pt denies any symptoms. no active bleeding noted.

## 2023-09-05 NOTE — ED ADULT NURSE NOTE - NSSEPSISSUSPECTED_ED_A_ED
-- DO NOT REPLY / DO NOT REPLY ALL --  -- Message is from [facility] ([facility]) --    General Patient Message:     Patient called to report her INR for today of 2.0 Patient said she did miss one dose last night as she fell asleep. Please call patient back to discuss and advise.     Caller Information       Type Contact Phone/Fax    09/05/2023 02:30 PM CDT Phone (Incoming) Moira Caballero (Self) [phone] (M)        Alternative phone number: NA    Can a detailed message be left? Yes    Message Turnaround: [place]:    Refer to site's KB page for routing instructions    Please give this turnaround time to the caller:   \"You can expect to receive a response 2-3 business days after your provider's clinical team reviews the message\"               No

## 2023-10-09 NOTE — ED PROVIDER NOTE - IV ALTEPLASE ADMIN OUTSIDE HIDDEN
PRE-SEDATION ASSESSMENT    CONSENT  Risks, benefits, and alternatives have been discussed with the patient/patient representative, and patient/patient representative agrees to proceed: Yes    MEDICAL HISTORY  Significant medical/surgical history: Yes (CKD, HTN)  Past Complications with Sedation/Anesthesia: No  Significant Family History: No  Smoking History: Yes (10 pk yr quit 1965)  Alcohol/Drug abuse: No  Possible Pregnancy (LMP): No  Cardiac History: Yes (PAF, ASHD)  Respiratory History: No    PHYSICAL EXAM  History and Physical Reviewed: H&P completed today  Airway Risk History: No previous complications  Airway Anatomy : Class II  Heart : Normal  Lungs : Normal  LOC/Mental Status : Abnormal  LOC/Mental Status (Comment) : daughter gave consent    OTHER FINDINGS  Reviewed current medications and allergies: Yes  Pertinent lab/diagnostic test reviewed: Yes    SEDATION RISK ASSESSMENT  Risk Status ASA: Class III - Severe systemic disease, limits activity, is not incapacitated  Plan for Sedation: Moderate Sedation  Indications for Procedure/Pre-Procedure Diagnosis and Planned Procedure: Extensive Lt ileal femoral dvt, May Thurner/ venogram with intervention, mechanical thrombectomy  EKG Monitoring: Yes    NARRATIVE FINDINGS      show

## 2023-11-22 NOTE — PATIENT PROFILE ADULT - DOES PATIENT HAVE ADVANCE DIRECTIVE
Quality 226: Preventive Care And Screening: Tobacco Use: Screening And Cessation Intervention: Patient screened for tobacco use and is an ex/non-smoker Detail Level: Detailed Quality 130: Documentation Of Current Medications In The Medical Record: Current Medications Documented No

## 2024-05-21 NOTE — ED ADULT TRIAGE NOTE - WEIGHT IN LBS
A catheter was exchanged for a (SHEATH 6FR 10CM 2.5CM INTRO SNAP ON DIL LOCK KINK RST SMTH) catheter. PIg. 128

## 2024-06-25 NOTE — ED ADULT NURSE NOTE - NSFALLRSKHARMRISK_ED_ALL_ED
What Type Of Note Output Would You Prefer (Optional)?: Standard Output Is The Patient Presenting As Previously Scheduled?: Yes How Severe Is Your Rash?: moderate Is This A New Presentation, Or A Follow-Up?: Rash no

## 2024-09-05 NOTE — ED ADULT TRIAGE NOTE - SOURCE OF INFORMATION
Patient/EMS Quality 130: Documentation Of Current Medications In The Medical Record: Current Medications Documented Detail Level: Detailed Quality 226: Preventive Care And Screening: Tobacco Use: Screening And Cessation Intervention: Patient screened for tobacco use and is an ex/non-smoker

## 2024-10-20 NOTE — ED ADULT NURSE NOTE - ED STAT RN HANDOFF DETAILS 3
patient CT positive for normal cervical lordosis with no acute fracture noted on cervical spine.  Head CT within normal limits with no intracranial pathology.  Lumbar spine CT positive for left-sided osteophyte complex at L3-L4, L4-5 disc bulge noted with central disc protrusion is seen mild no acute fracture noted.  Patient made aware of all of his CT findings and will follow-up with orthopedic spine as discussed
Pt endorsed to SALENA Marlow

## 2024-11-12 NOTE — ED ADULT NURSE NOTE - NS PRO AD NO ADVANCE DIRECTIVE
No
Pt states that she has had a few days of sob as well as B/L LE edema and rash. Pt states that she has also had L chest pain that is worse with breathing. Pt states that she called her cardiologist today and was told to come to the ER.    physical - rrr. ctab. abd - soft, nt. no edema. no rash.    plan - labs and imaging reviewed. trop neg. ekg nonischemic. case d/w dr. hector, cards attending, who recommended one dose of lasix 20 mg iv and d/c with outpatient f/up as it is felt that this is noncardiac LE swelling and chest pain. Pt given return instructions.

## 2024-11-21 NOTE — ED PROVIDER NOTE - PROGRESS NOTE DETAILS
[facility] - 4th Floor [facility] Medicine  Progress Note    Patient Name: Winter Robbins  MRN: [id number]  Patient Class: IP- Inpatient   Admission Date: 11/9/2024  Length of Stay: 12 days  Attending Physician: Deonte Bullard MD  Primary Care Provider: Salo Feliciano MD        Subjective:     Principal Problem:Aspiration pneumonia of right lower lobe        HPI:   82-year-old female with a past medical history as below including asthma, HTN, anxiety who presented to the ER complaining of persistent cough over the last week with associated dyspnea.  She does have home oxygen at baseline uses around 2 L continuously.  She also reported generalized weakness.  Patient denied any obvious bleeding, melena, hematochezia.     On arrival to the ER she was afebrile hemodynamically stable maintaining adequate sats on baseline 2 L supplemental oxygen.  Laboratory work was significant for hemoglobin of 6.8 and an MCV of 68, an iron sat of 2 %, chest x-ray was unremarkable and CT of the chest without contrast showed large right lower lung clusters of nodularity is possibly infectious or inflammatory.  Patient declined rectal exam in the ER.  Hospitalist was consulted for admission  Patient was initially started on Rocephin azithromycin IV steroids and duo nebs patient has been very agitated had required multiple doses of IV medications to calm her down.  We had to initially put  monitor and then we switched to 1: 1 sitter, psych changed her medication to Seroquel but patient refused she was given trazodone at night  Antibiotics were switched to cefepime/ speech was consulted there was silent aspiration, started on modified diet .sputum cultures were ordered they are growing 2 different Gram-negative rods and staph aureus pulmonology consulted nd patient has been started on vancomycin and cefepime    Overview/Hospital Course:  11/21/24-Patient is sitting up in chair in no distress.  Her IV  antibiotics are to be completed tomorrow.  She should be discharged home afterwards.      Interval History:     Review of Systems   Constitutional:  Positive for activity change.   HENT: Negative.     Eyes: Negative.    Respiratory:  Positive for cough.    Cardiovascular: Negative.    Gastrointestinal: Negative.    Endocrine: Negative.    Genitourinary: Negative.    Musculoskeletal: Negative.    Skin: Negative.    Allergic/Immunologic: Negative.    Neurological: Negative.    Hematological: Negative.    Psychiatric/Behavioral:  Positive for agitation.      Objective:     Vital Signs (Most Recent):  Temp: 98.4 °F (36.9 °C) (11/21/24 1134)  Pulse: 80 (11/21/24 1134)  Resp: 18 (11/21/24 1134)  BP: 110/60 (11/21/24 1134)  SpO2: 96 % (11/21/24 1134) Vital Signs (24h Range):  Temp:  [98 °F (36.7 °C)-98.6 °F (37 °C)] 98.4 °F (36.9 °C)  Pulse:  [77-86] 80  Resp:  [18-20] 18  SpO2:  [94 %-97 %] 96 %  BP: (110-134)/(56-70) 110/60     Weight: 59.2 kg (130 lb 8 oz)  Body mass index is 21.72 kg/m².    Intake/Output Summary (Last 24 hours) at 11/21/2024 1155  Last data filed at 11/21/2024 0910  Gross per 24 hour   Intake 580 ml   Output 700 ml   Net -120 ml         Physical Exam  Constitutional:       Appearance: Normal appearance. She is normal weight.   HENT:      Head: Normocephalic and atraumatic.      Nose: Nose normal.      Mouth/Throat:      Mouth: Mucous membranes are moist.      Pharynx: Oropharynx is clear.   Eyes:      Extraocular Movements: Extraocular movements intact.      Conjunctiva/sclera: Conjunctivae normal.      Pupils: Pupils are equal, round, and reactive to light.   Cardiovascular:      Rate and Rhythm: Normal rate and regular rhythm.      Pulses: Normal pulses.      Heart sounds: Normal heart sounds.   Pulmonary:      Effort: Pulmonary effort is normal.      Breath sounds: Normal breath sounds.   Abdominal:      General: Bowel sounds are normal.      Palpations: Abdomen is soft.   Musculoskeletal:          "General: Normal range of motion.      Cervical back: Normal range of motion and neck supple.   Skin:     General: Skin is warm and dry.      Capillary Refill: Capillary refill takes 2 to 3 seconds.   Neurological:      General: No focal deficit present.      Mental Status: She is alert and oriented to person, place, and time. Mental status is at baseline.   Psychiatric:         Mood and Affect: Affect is angry.         Behavior: Behavior is agitated.             Significant Labs: All pertinent labs within the past 24 hours have been reviewed.  BMP: No results for input(s): "GLU", "NA", "K", "CL", "CO2", "BUN", "CREATININE", "CALCIUM", "MG" in the last 48 hours.  CBC: No results for input(s): "WBC", "HGB", "HCT", "PLT" in the last 48 hours.  CMP: No results for input(s): "NA", "K", "CL", "CO2", "GLU", "BUN", "CREATININE", "CALCIUM", "PROT", "ALBUMIN", "BILITOT", "ALKPHOS", "AST", "ALT", "ANIONGAP", "EGFRNONAA" in the last 48 hours.    Invalid input(s): "ESTGFAFRICA"  Magnesium: No results for input(s): "MG" in the last 48 hours.    Significant Imaging: I have reviewed all pertinent imaging results/findings within the past 24 hours.    Assessment/Plan:      * Aspiration pneumonia of right lower lobe  Patient has a diagnosis of pneumonia. The cause of the pneumonia is suspected to be bacterial in etiology but organism is not known. The pneumonia is stable. The patient has the following signs/symptoms of pneumonia: cough. The patient does have a current oxygen requirement and the patient does not have a home oxygen requirement. I have reviewed the pertinent imaging. The following cultures have been collected: Blood cultures and Sputum culture The culture results are listed below.     Current antimicrobial regimen consists of the antibiotics listed below. Will monitor patient closely and continue current treatment plan unchanged.    Antibiotics (From admission, onward)      Start     Stop Route Frequency Ordered    " 11/14/24 1200  vancomycin (VANCOCIN) 1,000 mg in D5W 250 mL IVPB (admixture device)         -- IV Every 24 hours (non-standard times) 11/14/24 1059    11/12/24 1430  ceFEPIme injection 2 g         11/22/24 1429 IV Every 12 hours (non-standard times) 11/12/24 0906    11/09/24 2245  mupirocin 2 % ointment         11/14/24 2059 Nasl 2 times daily 11/09/24 2142            Microbiology Results (last 7 days)       Procedure Component Value Units Date/Time    Blood Culture [[id number]]  (Normal) Collected: 11/10/24 1458    Order Status: Completed Specimen: Blood, Venous Updated: 11/15/24 1900     Blood Culture No Growth at 5 days    Blood Culture [[id number]]  (Normal) Collected: 11/10/24 1458    Order Status: Completed Specimen: Blood, Venous Updated: 11/15/24 1900     Blood Culture No Growth at 5 days            Primary hypertension  Patients blood pressure range in the last 24 hours was: BP  Min: 110/60  Max: 134/63.The patient's inpatient anti-hypertensive regimen is listed below:  Current Antihypertensives  hydrALAZINE tablet 25 mg, 3 times daily, Oral    Plan  - BP is controlled, no changes needed to their regimen  - .    Moderate persistent asthma with exacerbation        Moderate malnutrition  Nutrition consulted. Most recent weight and BMI monitored-     Measurements:  Wt Readings from Last 1 Encounters:   11/09/24 59.2 kg (130 lb 8 oz)   Body mass index is 21.72 kg/m².    Patient has been screened and assessed by RD.    Malnutrition Type:  Context: acute illness or injury  Level:  (does not meet criteria at this time)    Malnutrition Characteristic Summary:  Weight Loss (Malnutrition):  (patient denies)  Energy Intake (Malnutrition):  (patient denies)  Subcutaneous Fat (Malnutrition):  (none noted)  Muscle Mass (Malnutrition): mild depletion    Interventions/Recommendations (treatment strategy):           VTE Risk Mitigation (From admission, onward)           Ordered     IP VTE HIGH RISK PATIENT  Once          11/10/24 0135     Place sequential compression device  Until discontinued         11/10/24 0135                IV antibiotics until tomorrow  OOB  Therapy  Likely d/c home tomorrow  Resume current meds    Discharge Planning   SERVANDO:      Code Status: Full Code   Is the patient medically ready for discharge?:     Reason for patient still in hospital (select all that apply): Patient trending condition, Treatment, PT / OT recommendations, and Pending disposition  Discharge Plan A: Home Health                  Rodrigue Angelo MD  Department of Hospital Medicine   [facility] - 4th Floor Medical Telemetry     [place], PGY3 - D/w Dr. Mosley [phone], requesting Plt over 30min-1hr (may need Benadryl prior) and pRBC over 1.5hr x2, regardless of lab results, and does not require repeat labs. [place], PGY3 - pt previously consented for blood, states she has never required benadryl prior to transfusions before completed transfusion

## 2024-12-14 NOTE — ED ADULT NURSE NOTE - NS PRO PASSIVE SMOKE EXP
Johanna Cm  1965    ASSESSMENT/PLAN    1. Acute bacterial sinusitis (Primary)  - amoxicillin-clavulanate (AUGMENTIN) 875-125 MG tablet; Take 1 tablet by mouth 2 times daily for 7 days.  Dispense: 14 tablet; Refill: 0  2. Sore throat  - Group A Streptococcus PCR Throat Swab  - Influenza A/B, RSV and SARS-CoV2 PCR (COVID-19) Nose  3. Acute cough  - Group A Streptococcus PCR Throat Swab  - Influenza A/B, RSV and SARS-CoV2 PCR (COVID-19) Nose    -Strep testing negative.  Influenza, COVID, and RSV negative  -Provided patient with Augmentin twice daily for 7 days for acute bacterial sinusitis.  Recommend nasal saline rinses with Saritha pot.  - Symptomatic treatment - Encouraged fluids, salt water gargles, honey, humidifier, saline nasal spray, lozenges, tea, soup, smoothies, popsicles, topical vapor rub, rest, etc   - May use over-the-counter Tylenol or ibuprofen PRN  - Follow up as needed for new or worsening symptoms      *Explanation of diagnosis, treatment options and risk and benefits of medications reviewed with patient. Patient agrees with plan of care.  *All questions were answered.    *Red flags symptoms were discussed and patient was advised when they should return for reevaluation or for prompt emergency evaluation.   *Patient was given verbal and written instructions on plan of care. Instructions were printed or are available on Mychart on electronic AVS.   *We discussed potential side effects of any prescribed or recommended therapies, as well as expectations for response to treatments.  *Patient discharged in stable condition    James Klein, DNP, APRN, FNP-C  [facility]    SUBJECTIVE  CHIEF COMPLAINT/ REASON FOR VISIT  Patient presents with:  Sinus Problem: Possible sinus infection, sore throat, cough, chest congestion     HISTORY OF PRESENT ILLNESS  Johanna Cm is a pleasant 59 year old female presents to rapid clinic today with nasal congestion, sore throat, and acute  "cough.  Over the last 10 days patient has been dealing with congested sinuses.  She has used steroid nasal rinses approximately twice daily for the last 10 days with no improvement.  She does have a history of recurrent sinusitis and was last treated October 15, 2024.  She reports that she does also have a sore throat and swollen neck glands.  She hears her ears popping frequently and does have some pain.  Additionally, yesterday she developed an acute cough and chest tightness, but links this to her cough. She does endorse a fever at the initial start of symptoms and continues to have chills and bodyaches throughout the last 10 days.  She is concerned that she may have a sinus infection.    History provided by patient      I have reviewed the nursing notes.  I have reviewed allergies, medication list, problem list, and past medical history.    REVIEW OF SYSTEMS  Review of Systems   Constitutional:  Positive for chills and fever.   HENT:  Positive for congestion, ear pain, sinus pressure, sinus pain and sore throat.    Eyes: Negative.    Respiratory:  Positive for cough and chest tightness.    Cardiovascular: Negative.    Gastrointestinal:  Negative for diarrhea, nausea and vomiting.   Genitourinary: Negative.    Musculoskeletal: Negative.    Skin: Negative.         VITAL SIGNS  Vitals:    12/14/24 0951   BP: 130/80   BP Location: Right arm   Patient Position: Sitting   Cuff Size: Adult Regular   Pulse: 90   Resp: 14   Temp: 97.8  F (36.6  C)   TempSrc: Temporal   SpO2: 99%   Weight: 95.6 kg (210 lb 12.8 oz)   Height: 1.695 m (5' 6.75\")      Body mass index is 33.26 kg/m .      OBJECTIVE  PHYSICAL EXAM  Physical Exam  Vitals and nursing note reviewed.   Constitutional:       General: She is not in acute distress.     Appearance: Normal appearance. She is normal weight. She is not ill-appearing, toxic-appearing or diaphoretic.   HENT:      Head: Normocephalic and atraumatic.      Right Ear: Tympanic membrane, ear " canal and external ear normal. There is no impacted cerumen.      Left Ear: Tympanic membrane, ear canal and external ear normal. There is no impacted cerumen.      Nose: Congestion present. No rhinorrhea.      Right Sinus: Maxillary sinus tenderness and frontal sinus tenderness present.      Left Sinus: Maxillary sinus tenderness and frontal sinus tenderness present.      Mouth/Throat:      Mouth: Mucous membranes are moist.      Pharynx: Oropharynx is clear. Posterior oropharyngeal erythema present.   Eyes:      Extraocular Movements: Extraocular movements intact.      Conjunctiva/sclera: Conjunctivae normal.      Pupils: Pupils are equal, round, and reactive to light.   Cardiovascular:      Rate and Rhythm: Normal rate and regular rhythm.      Pulses: Normal pulses.      Heart sounds: Normal heart sounds.   Pulmonary:      Effort: Pulmonary effort is normal. No respiratory distress.      Breath sounds: Normal breath sounds. No rhonchi.   Musculoskeletal:      Cervical back: Normal range of motion. No rigidity.   Lymphadenopathy:      Cervical: No cervical adenopathy.   Neurological:      Mental Status: She is alert.            DIAGNOSTICS  Results for orders placed or performed in visit on 12/14/24   Influenza A/B, RSV and SARS-CoV2 PCR (COVID-19) Nose     Status: Normal    Specimen: Nose; Swab   Result Value Ref Range    Influenza A PCR Negative Negative    Influenza B PCR Negative Negative    RSV PCR Negative Negative    SARS CoV2 PCR Negative Negative    Narrative    Testing was performed using the Xpert Xpress CoV2/Flu/RSV Assay on the [employer] Gene[employer]pert Instrument. This test should be ordered for the detection of SARS-CoV2, influenza, and RSV viruses in individuals with signs and symptoms of respiratory tract infection. This test is for in vitro diagnostic use under the [place] FDA for laboratories certified under CLIA to perform high or moderate complexity testing. This test has been [place] FDA cleared. A negative  result does not rule out the presence of PCR inhibitors in the specimen or target RNA in concentration below the limit of detection for the assay. If only one viral target is positive but coinfection with multiple targets is suspected, the sample should be re-tested with another FDA cleared, approved, or authorized test, if coninfection would change clinical management. This test was validated by the [facility]. These laboratories are certified under the Clinical Laboratory Improvement Amendments of 1988 (CLIA-88) as qualified to perfom high complexity laboratory testing.   Group A Streptococcus PCR Throat Swab     Status: Normal    Specimen: Throat; Swab   Result Value Ref Range    Group A strep by PCR Not Detected Not Detected    Narrative    The Xpert Xpress Strep A test, performed on the [employer]ument Systems, is a rapid, qualitative in vitro diagnostic test for the detection of Streptococcus pyogenes (Group A ß-hemolytic Streptococcus, Strep A) in throat swab specimens from patients with signs and symptoms of pharyngitis. The Xpert Xpress Strep A test can be used as an aid in the diagnosis of Group A Streptococcal pharyngitis. The assay is not intended to monitor treatment for Group A Streptococcus infections. The Xpert Xpress Strep A test utilizes an automated real-time polymerase chain reaction (PCR) to detect Streptococcus pyogenes DNA.       No

## 2025-01-25 NOTE — ED PROVIDER NOTE - CLINICAL SUMMARY MEDICAL DECISION MAKING FREE TEXT BOX
Patient here with transfusion. Will transfuse.
You can access the FollowMyHealth Patient Portal offered by [facility] by registering at the following website: [email or web address]. By joining [employer]’s FollowMyHealth portal, you will also be able to view your health information using other applications (apps) compatible with our system.

## 2025-03-04 NOTE — ED ADULT TRIAGE NOTE - CCCP TRG CHIEF CMPLNT
Bed: H05  Expected date:   Expected time:   Means of arrival:   Comments:  TRIAGE   right/pain, lower leg

## 2025-03-11 NOTE — ED ADULT NURSE NOTE - SUICIDE SCREENING QUESTION 2
Pt called nurse line, was advised to stay home due to flu sx. Fever, stuffy , coughing, sore throat     Needs a note to be excused from work for 3/11/25 and 3/12/25 pt was advised to see pcp or walk in for testing if she is not feeling better.     Can send via [employer]. ,,ok per    No

## 2025-04-09 NOTE — ED ADULT NURSE NOTE - NS ED NURSE DC PT EDUCATION RESOURCES
- Furosemide tomorrow morning and afternoon    - Start carvedilol for blood pressure/heart medicine.  Stop diltiazem    - Call kidney doctors for appointment  
Yes

## (undated) DEVICE — MASK OXYGEN PANORAMIC

## (undated) DEVICE — TUBING CANNULA SALTER LABS NASAL ADULT 7FT

## (undated) DEVICE — BITE BLOCK MOUTHPCW/STRAP

## (undated) DEVICE — WRAP COMPRESSION CALF MED

## (undated) DEVICE — SOLIDIFIER CANN EXPRESS 3K

## (undated) DEVICE — FORMALIN CONTAINER MED

## (undated) DEVICE — PLATE NESSY 170

## (undated) DEVICE — PROBE FIAPC CIRC O.D. 2.3MM/6.9FR LNTH 220CM/7.2FT

## (undated) DEVICE — SOL INJ NS 0.9% 500ML 1-PORT

## (undated) DEVICE — RADIAL JAW 4 240CM WITH NDL

## (undated) DEVICE — BITE BLOCK SCOPE SAVER 20X27MM ADULT GREEN